# Patient Record
Sex: FEMALE | Race: BLACK OR AFRICAN AMERICAN | Employment: UNEMPLOYED | URBAN - METROPOLITAN AREA
[De-identification: names, ages, dates, MRNs, and addresses within clinical notes are randomized per-mention and may not be internally consistent; named-entity substitution may affect disease eponyms.]

---

## 2017-01-01 ENCOUNTER — TELEPHONE (OUTPATIENT)
Dept: OTHER | Age: 82
End: 2017-01-01

## 2017-01-01 ENCOUNTER — HOSPITAL ENCOUNTER (INPATIENT)
Age: 82
LOS: 13 days | Discharge: HOSPICE | DRG: 388 | End: 2017-02-07
Attending: EMERGENCY MEDICINE | Admitting: INTERNAL MEDICINE
Payer: MEDICARE

## 2017-01-01 ENCOUNTER — APPOINTMENT (OUTPATIENT)
Dept: GENERAL RADIOLOGY | Age: 82
DRG: 388 | End: 2017-01-01
Payer: MEDICARE

## 2017-01-01 ENCOUNTER — DOCUMENTATION ONLY (OUTPATIENT)
Dept: OTHER | Age: 82
End: 2017-01-01

## 2017-01-01 ENCOUNTER — HOME CARE VISIT (OUTPATIENT)
Dept: HOSPICE | Facility: HOSPICE | Age: 82
End: 2017-01-01
Payer: MEDICARE

## 2017-01-01 ENCOUNTER — HOME CARE VISIT (OUTPATIENT)
Dept: SCHEDULING | Facility: HOME HEALTH | Age: 82
End: 2017-01-01
Payer: MEDICARE

## 2017-01-01 ENCOUNTER — HOSPITAL ENCOUNTER (INPATIENT)
Age: 82
LOS: 1 days | End: 2017-11-07
Attending: INTERNAL MEDICINE | Admitting: INTERNAL MEDICINE

## 2017-01-01 ENCOUNTER — APPOINTMENT (OUTPATIENT)
Dept: GENERAL RADIOLOGY | Age: 82
DRG: 388 | End: 2017-01-01
Attending: PHYSICIAN ASSISTANT
Payer: MEDICARE

## 2017-01-01 ENCOUNTER — APPOINTMENT (OUTPATIENT)
Dept: GENERAL RADIOLOGY | Age: 82
DRG: 389 | End: 2017-01-01
Attending: SURGERY
Payer: MEDICARE

## 2017-01-01 ENCOUNTER — APPOINTMENT (OUTPATIENT)
Dept: GENERAL RADIOLOGY | Age: 82
DRG: 388 | End: 2017-01-01
Attending: NURSE PRACTITIONER
Payer: MEDICARE

## 2017-01-01 ENCOUNTER — HOSPITAL ENCOUNTER (INPATIENT)
Age: 82
LOS: 3 days | Discharge: HOSPICE | DRG: 389 | End: 2017-06-17
Attending: STUDENT IN AN ORGANIZED HEALTH CARE EDUCATION/TRAINING PROGRAM | Admitting: FAMILY MEDICINE
Payer: MEDICARE

## 2017-01-01 ENCOUNTER — APPOINTMENT (OUTPATIENT)
Dept: CT IMAGING | Age: 82
DRG: 388 | End: 2017-01-01
Attending: EMERGENCY MEDICINE
Payer: MEDICARE

## 2017-01-01 ENCOUNTER — HOSPICE ADMISSION (OUTPATIENT)
Dept: HOSPICE | Facility: HOSPICE | Age: 82
End: 2017-01-01
Payer: MEDICARE

## 2017-01-01 ENCOUNTER — HOSPITAL ENCOUNTER (INPATIENT)
Age: 82
LOS: 16 days | Discharge: HOME | End: 2017-07-03
Attending: INTERNAL MEDICINE | Admitting: INTERNAL MEDICINE

## 2017-01-01 ENCOUNTER — APPOINTMENT (OUTPATIENT)
Dept: CT IMAGING | Age: 82
DRG: 388 | End: 2017-01-01
Attending: NURSE PRACTITIONER
Payer: MEDICARE

## 2017-01-01 ENCOUNTER — APPOINTMENT (OUTPATIENT)
Dept: CT IMAGING | Age: 82
DRG: 389 | End: 2017-01-01
Attending: STUDENT IN AN ORGANIZED HEALTH CARE EDUCATION/TRAINING PROGRAM
Payer: MEDICARE

## 2017-01-01 ENCOUNTER — APPOINTMENT (OUTPATIENT)
Dept: GENERAL RADIOLOGY | Age: 82
DRG: 388 | End: 2017-01-01
Attending: INTERNAL MEDICINE
Payer: MEDICARE

## 2017-01-01 VITALS — HEART RATE: 55 BPM | RESPIRATION RATE: 18 BRPM | DIASTOLIC BLOOD PRESSURE: 70 MMHG | SYSTOLIC BLOOD PRESSURE: 154 MMHG

## 2017-01-01 VITALS — HEART RATE: 92 BPM | TEMPERATURE: 97.5 F | RESPIRATION RATE: 20 BRPM

## 2017-01-01 VITALS
TEMPERATURE: 97 F | HEART RATE: 70 BPM | RESPIRATION RATE: 16 BRPM | DIASTOLIC BLOOD PRESSURE: 73 MMHG | SYSTOLIC BLOOD PRESSURE: 150 MMHG

## 2017-01-01 VITALS
SYSTOLIC BLOOD PRESSURE: 184 MMHG | TEMPERATURE: 97.7 F | HEART RATE: 78 BPM | HEIGHT: 64 IN | WEIGHT: 100 LBS | BODY MASS INDEX: 17.07 KG/M2 | RESPIRATION RATE: 16 BRPM | OXYGEN SATURATION: 95 % | DIASTOLIC BLOOD PRESSURE: 78 MMHG

## 2017-01-01 VITALS
DIASTOLIC BLOOD PRESSURE: 64 MMHG | TEMPERATURE: 96.5 F | RESPIRATION RATE: 20 BRPM | HEART RATE: 102 BPM | SYSTOLIC BLOOD PRESSURE: 142 MMHG

## 2017-01-01 VITALS
RESPIRATION RATE: 20 BRPM | DIASTOLIC BLOOD PRESSURE: 80 MMHG | HEART RATE: 78 BPM | SYSTOLIC BLOOD PRESSURE: 110 MMHG | TEMPERATURE: 97.5 F

## 2017-01-01 VITALS — HEART RATE: 62 BPM | SYSTOLIC BLOOD PRESSURE: 115 MMHG | RESPIRATION RATE: 18 BRPM | DIASTOLIC BLOOD PRESSURE: 53 MMHG

## 2017-01-01 VITALS — DIASTOLIC BLOOD PRESSURE: 63 MMHG | RESPIRATION RATE: 18 BRPM | HEART RATE: 66 BPM | SYSTOLIC BLOOD PRESSURE: 110 MMHG

## 2017-01-01 VITALS
WEIGHT: 90 LBS | RESPIRATION RATE: 18 BRPM | DIASTOLIC BLOOD PRESSURE: 80 MMHG | SYSTOLIC BLOOD PRESSURE: 143 MMHG | BODY MASS INDEX: 16.46 KG/M2 | HEART RATE: 61 BPM

## 2017-01-01 VITALS
HEART RATE: 72 BPM | SYSTOLIC BLOOD PRESSURE: 171 MMHG | WEIGHT: 100 LBS | HEIGHT: 62 IN | RESPIRATION RATE: 16 BRPM | OXYGEN SATURATION: 99 % | DIASTOLIC BLOOD PRESSURE: 78 MMHG | BODY MASS INDEX: 18.4 KG/M2 | TEMPERATURE: 97 F

## 2017-01-01 VITALS — DIASTOLIC BLOOD PRESSURE: 65 MMHG | HEART RATE: 60 BPM | SYSTOLIC BLOOD PRESSURE: 118 MMHG | RESPIRATION RATE: 18 BRPM

## 2017-01-01 VITALS — SYSTOLIC BLOOD PRESSURE: 132 MMHG | RESPIRATION RATE: 20 BRPM | DIASTOLIC BLOOD PRESSURE: 67 MMHG | HEART RATE: 68 BPM

## 2017-01-01 VITALS — RESPIRATION RATE: 18 BRPM | DIASTOLIC BLOOD PRESSURE: 69 MMHG | HEART RATE: 72 BPM | SYSTOLIC BLOOD PRESSURE: 111 MMHG

## 2017-01-01 VITALS — SYSTOLIC BLOOD PRESSURE: 122 MMHG | DIASTOLIC BLOOD PRESSURE: 67 MMHG | HEART RATE: 58 BPM | RESPIRATION RATE: 18 BRPM

## 2017-01-01 VITALS — SYSTOLIC BLOOD PRESSURE: 145 MMHG | DIASTOLIC BLOOD PRESSURE: 75 MMHG | RESPIRATION RATE: 18 BRPM | HEART RATE: 57 BPM

## 2017-01-01 VITALS
WEIGHT: 92 LBS | RESPIRATION RATE: 18 BRPM | SYSTOLIC BLOOD PRESSURE: 108 MMHG | BODY MASS INDEX: 16.83 KG/M2 | DIASTOLIC BLOOD PRESSURE: 65 MMHG | HEART RATE: 65 BPM

## 2017-01-01 VITALS
DIASTOLIC BLOOD PRESSURE: 88 MMHG | TEMPERATURE: 97.9 F | HEART RATE: 88 BPM | SYSTOLIC BLOOD PRESSURE: 128 MMHG | RESPIRATION RATE: 18 BRPM

## 2017-01-01 VITALS — RESPIRATION RATE: 24 BRPM | SYSTOLIC BLOOD PRESSURE: 80 MMHG | HEART RATE: 126 BPM

## 2017-01-01 VITALS — DIASTOLIC BLOOD PRESSURE: 58 MMHG | SYSTOLIC BLOOD PRESSURE: 128 MMHG | RESPIRATION RATE: 18 BRPM | HEART RATE: 92 BPM

## 2017-01-01 VITALS
BODY MASS INDEX: 15.91 KG/M2 | HEART RATE: 81 BPM | RESPIRATION RATE: 18 BRPM | SYSTOLIC BLOOD PRESSURE: 135 MMHG | WEIGHT: 87 LBS | DIASTOLIC BLOOD PRESSURE: 65 MMHG

## 2017-01-01 VITALS — SYSTOLIC BLOOD PRESSURE: 152 MMHG | RESPIRATION RATE: 18 BRPM | HEART RATE: 74 BPM | DIASTOLIC BLOOD PRESSURE: 83 MMHG

## 2017-01-01 VITALS — SYSTOLIC BLOOD PRESSURE: 152 MMHG | DIASTOLIC BLOOD PRESSURE: 73 MMHG | RESPIRATION RATE: 18 BRPM | HEART RATE: 54 BPM

## 2017-01-01 VITALS — SYSTOLIC BLOOD PRESSURE: 104 MMHG | HEART RATE: 59 BPM | DIASTOLIC BLOOD PRESSURE: 49 MMHG | RESPIRATION RATE: 18 BRPM

## 2017-01-01 VITALS — DIASTOLIC BLOOD PRESSURE: 82 MMHG | RESPIRATION RATE: 18 BRPM | SYSTOLIC BLOOD PRESSURE: 164 MMHG | HEART RATE: 80 BPM

## 2017-01-01 VITALS — DIASTOLIC BLOOD PRESSURE: 84 MMHG | RESPIRATION RATE: 18 BRPM | HEART RATE: 60 BPM | SYSTOLIC BLOOD PRESSURE: 154 MMHG

## 2017-01-01 VITALS — HEART RATE: 72 BPM | SYSTOLIC BLOOD PRESSURE: 137 MMHG | DIASTOLIC BLOOD PRESSURE: 73 MMHG | RESPIRATION RATE: 18 BRPM

## 2017-01-01 VITALS — DIASTOLIC BLOOD PRESSURE: 80 MMHG | RESPIRATION RATE: 18 BRPM | HEART RATE: 68 BPM | SYSTOLIC BLOOD PRESSURE: 118 MMHG

## 2017-01-01 DIAGNOSIS — N17.9 ACUTE KIDNEY INJURY (HCC): Primary | ICD-10-CM

## 2017-01-01 DIAGNOSIS — K56.609 SMALL BOWEL OBSTRUCTION (HCC): Primary | ICD-10-CM

## 2017-01-01 DIAGNOSIS — E86.0 DEHYDRATION: ICD-10-CM

## 2017-01-01 DIAGNOSIS — K56.50 SMALL BOWEL OBSTRUCTION DUE TO ADHESIONS (HCC): ICD-10-CM

## 2017-01-01 DIAGNOSIS — N39.0 LOWER URINARY TRACT INFECTIOUS DISEASE: ICD-10-CM

## 2017-01-01 DIAGNOSIS — N17.9 ACUTE KIDNEY INJURY (HCC): ICD-10-CM

## 2017-01-01 DIAGNOSIS — N18.30 CHRONIC KIDNEY DISEASE, STAGE III (MODERATE) (HCC): Chronic | ICD-10-CM

## 2017-01-01 LAB
ABO + RH BLD: NORMAL
ALBUMIN SERPL BCP-MCNC: 2.2 G/DL (ref 3.2–4.6)
ALBUMIN SERPL BCP-MCNC: 2.4 G/DL (ref 3.2–4.6)
ALBUMIN SERPL BCP-MCNC: 2.5 G/DL (ref 3.2–4.6)
ALBUMIN SERPL BCP-MCNC: 2.5 G/DL (ref 3.2–4.6)
ALBUMIN SERPL BCP-MCNC: 2.6 G/DL (ref 3.2–4.6)
ALBUMIN SERPL BCP-MCNC: 2.7 G/DL (ref 3.2–4.6)
ALBUMIN SERPL BCP-MCNC: 2.8 G/DL (ref 3.2–4.6)
ALBUMIN SERPL BCP-MCNC: 3.4 G/DL (ref 3.2–4.6)
ALBUMIN SERPL BCP-MCNC: 4.1 G/DL (ref 3.2–4.6)
ALBUMIN SERPL BCP-MCNC: 4.4 G/DL (ref 3.2–4.6)
ALBUMIN/GLOB SERPL: 0.5 {RATIO} (ref 1.2–3.5)
ALBUMIN/GLOB SERPL: 0.5 {RATIO} (ref 1.2–3.5)
ALBUMIN/GLOB SERPL: 0.6 {RATIO} (ref 1.2–3.5)
ALBUMIN/GLOB SERPL: 0.7 {RATIO} (ref 1.2–3.5)
ALBUMIN/GLOB SERPL: 0.7 {RATIO} (ref 1.2–3.5)
ALBUMIN/GLOB SERPL: 0.8 {RATIO} (ref 1.2–3.5)
ALBUMIN/GLOB SERPL: 0.9 {RATIO} (ref 1.2–3.5)
ALBUMIN/GLOB SERPL: 0.9 {RATIO} (ref 1.2–3.5)
ALP SERPL-CCNC: 29 U/L (ref 50–136)
ALP SERPL-CCNC: 37 U/L (ref 50–136)
ALP SERPL-CCNC: 40 U/L (ref 50–136)
ALP SERPL-CCNC: 44 U/L (ref 50–136)
ALP SERPL-CCNC: 45 U/L (ref 50–136)
ALP SERPL-CCNC: 47 U/L (ref 50–136)
ALP SERPL-CCNC: 48 U/L (ref 50–136)
ALP SERPL-CCNC: 54 U/L (ref 50–136)
ALP SERPL-CCNC: 58 U/L (ref 50–136)
ALP SERPL-CCNC: 77 U/L (ref 50–136)
ALT SERPL-CCNC: 109 U/L (ref 12–65)
ALT SERPL-CCNC: 14 U/L (ref 12–65)
ALT SERPL-CCNC: 16 U/L (ref 12–65)
ALT SERPL-CCNC: 21 U/L (ref 12–65)
ALT SERPL-CCNC: 23 U/L (ref 12–65)
ALT SERPL-CCNC: 26 U/L (ref 12–65)
ALT SERPL-CCNC: 31 U/L (ref 12–65)
ALT SERPL-CCNC: 33 U/L (ref 12–65)
ALT SERPL-CCNC: 34 U/L (ref 12–65)
ALT SERPL-CCNC: 42 U/L (ref 12–65)
ANION GAP BLD CALC-SCNC: 10 MMOL/L (ref 7–16)
ANION GAP BLD CALC-SCNC: 11 MMOL/L (ref 7–16)
ANION GAP BLD CALC-SCNC: 11 MMOL/L (ref 7–16)
ANION GAP BLD CALC-SCNC: 12 MMOL/L (ref 7–16)
ANION GAP BLD CALC-SCNC: 12 MMOL/L (ref 7–16)
ANION GAP BLD CALC-SCNC: 13 MMOL/L (ref 7–16)
ANION GAP BLD CALC-SCNC: 13 MMOL/L (ref 7–16)
ANION GAP BLD CALC-SCNC: 15 MMOL/L (ref 7–16)
ANION GAP BLD CALC-SCNC: 6 MMOL/L (ref 7–16)
ANION GAP BLD CALC-SCNC: 7 MMOL/L (ref 7–16)
ANION GAP BLD CALC-SCNC: 7 MMOL/L (ref 7–16)
ANION GAP BLD CALC-SCNC: 8 MMOL/L (ref 7–16)
ANION GAP BLD CALC-SCNC: 9 MMOL/L (ref 7–16)
APPEARANCE UR: CLEAR
AST SERPL W P-5'-P-CCNC: 103 U/L (ref 15–37)
AST SERPL W P-5'-P-CCNC: 22 U/L (ref 15–37)
AST SERPL W P-5'-P-CCNC: 22 U/L (ref 15–37)
AST SERPL W P-5'-P-CCNC: 25 U/L (ref 15–37)
AST SERPL W P-5'-P-CCNC: 26 U/L (ref 15–37)
AST SERPL W P-5'-P-CCNC: 26 U/L (ref 15–37)
AST SERPL W P-5'-P-CCNC: 32 U/L (ref 15–37)
AST SERPL W P-5'-P-CCNC: 37 U/L (ref 15–37)
AST SERPL W P-5'-P-CCNC: 39 U/L (ref 15–37)
AST SERPL W P-5'-P-CCNC: 41 U/L (ref 15–37)
BACTERIA SPEC CULT: NEGATIVE
BACTERIA SPEC CULT: NORMAL
BACTERIA URNS QL MICRO: 0 /HPF
BASOPHILS # BLD AUTO: 0 K/UL (ref 0–0.2)
BASOPHILS # BLD: 0 % (ref 0–2)
BILIRUB SERPL-MCNC: 0.4 MG/DL (ref 0.2–1.1)
BILIRUB SERPL-MCNC: 0.5 MG/DL (ref 0.2–1.1)
BILIRUB SERPL-MCNC: 0.5 MG/DL (ref 0.2–1.1)
BILIRUB SERPL-MCNC: 0.6 MG/DL (ref 0.2–1.1)
BILIRUB SERPL-MCNC: 0.7 MG/DL (ref 0.2–1.1)
BILIRUB SERPL-MCNC: 0.7 MG/DL (ref 0.2–1.1)
BILIRUB SERPL-MCNC: 0.9 MG/DL (ref 0.2–1.1)
BILIRUB UR QL: NEGATIVE
BLD PROD TYP BPU: NORMAL
BLOOD GROUP ANTIBODIES SERPL: NORMAL
BPU ID: NORMAL
BUN SERPL-MCNC: 13 MG/DL (ref 8–23)
BUN SERPL-MCNC: 15 MG/DL (ref 8–23)
BUN SERPL-MCNC: 19 MG/DL (ref 8–23)
BUN SERPL-MCNC: 22 MG/DL (ref 8–23)
BUN SERPL-MCNC: 24 MG/DL (ref 8–23)
BUN SERPL-MCNC: 25 MG/DL (ref 8–23)
BUN SERPL-MCNC: 28 MG/DL (ref 8–23)
BUN SERPL-MCNC: 29 MG/DL (ref 8–23)
BUN SERPL-MCNC: 30 MG/DL (ref 8–23)
BUN SERPL-MCNC: 32 MG/DL (ref 8–23)
BUN SERPL-MCNC: 34 MG/DL (ref 8–23)
BUN SERPL-MCNC: 37 MG/DL (ref 8–23)
BUN SERPL-MCNC: 39 MG/DL (ref 8–23)
BUN SERPL-MCNC: 44 MG/DL (ref 8–23)
BUN SERPL-MCNC: 48 MG/DL (ref 8–23)
BUN SERPL-MCNC: 59 MG/DL (ref 8–23)
BUN SERPL-MCNC: 66 MG/DL (ref 8–23)
BUN SERPL-MCNC: 73 MG/DL (ref 8–23)
CALCIUM SERPL-MCNC: 10 MG/DL (ref 8.3–10.4)
CALCIUM SERPL-MCNC: 7.3 MG/DL (ref 8.3–10.4)
CALCIUM SERPL-MCNC: 7.6 MG/DL (ref 8.3–10.4)
CALCIUM SERPL-MCNC: 8 MG/DL (ref 8.3–10.4)
CALCIUM SERPL-MCNC: 8.3 MG/DL (ref 8.3–10.4)
CALCIUM SERPL-MCNC: 8.4 MG/DL (ref 8.3–10.4)
CALCIUM SERPL-MCNC: 8.5 MG/DL (ref 8.3–10.4)
CALCIUM SERPL-MCNC: 8.6 MG/DL (ref 8.3–10.4)
CALCIUM SERPL-MCNC: 8.7 MG/DL (ref 8.3–10.4)
CALCIUM SERPL-MCNC: 8.8 MG/DL (ref 8.3–10.4)
CALCIUM SERPL-MCNC: 8.9 MG/DL (ref 8.3–10.4)
CALCIUM SERPL-MCNC: 8.9 MG/DL (ref 8.3–10.4)
CALCIUM SERPL-MCNC: 9.2 MG/DL (ref 8.3–10.4)
CALCIUM SERPL-MCNC: 9.2 MG/DL (ref 8.3–10.4)
CASTS URNS QL MICRO: 0 /LPF
CHLORIDE SERPL-SCNC: 103 MMOL/L (ref 98–107)
CHLORIDE SERPL-SCNC: 104 MMOL/L (ref 98–107)
CHLORIDE SERPL-SCNC: 105 MMOL/L (ref 98–107)
CHLORIDE SERPL-SCNC: 107 MMOL/L (ref 98–107)
CHLORIDE SERPL-SCNC: 107 MMOL/L (ref 98–107)
CHLORIDE SERPL-SCNC: 108 MMOL/L (ref 98–107)
CHLORIDE SERPL-SCNC: 109 MMOL/L (ref 98–107)
CHLORIDE SERPL-SCNC: 109 MMOL/L (ref 98–107)
CHLORIDE SERPL-SCNC: 110 MMOL/L (ref 98–107)
CHLORIDE SERPL-SCNC: 111 MMOL/L (ref 98–107)
CHLORIDE SERPL-SCNC: 111 MMOL/L (ref 98–107)
CHLORIDE SERPL-SCNC: 112 MMOL/L (ref 98–107)
CHLORIDE SERPL-SCNC: 114 MMOL/L (ref 98–107)
CHLORIDE SERPL-SCNC: 117 MMOL/L (ref 98–107)
CHLORIDE SERPL-SCNC: 118 MMOL/L (ref 98–107)
CO2 SERPL-SCNC: 19 MMOL/L (ref 21–32)
CO2 SERPL-SCNC: 20 MMOL/L (ref 21–32)
CO2 SERPL-SCNC: 20 MMOL/L (ref 21–32)
CO2 SERPL-SCNC: 21 MMOL/L (ref 21–32)
CO2 SERPL-SCNC: 22 MMOL/L (ref 21–32)
CO2 SERPL-SCNC: 22 MMOL/L (ref 21–32)
CO2 SERPL-SCNC: 24 MMOL/L (ref 21–32)
CO2 SERPL-SCNC: 25 MMOL/L (ref 21–32)
CO2 SERPL-SCNC: 26 MMOL/L (ref 21–32)
CO2 SERPL-SCNC: 27 MMOL/L (ref 21–32)
COLOR UR: YELLOW
CREAT SERPL-MCNC: 1.3 MG/DL (ref 0.6–1)
CREAT SERPL-MCNC: 1.35 MG/DL (ref 0.6–1)
CREAT SERPL-MCNC: 1.37 MG/DL (ref 0.6–1)
CREAT SERPL-MCNC: 1.5 MG/DL (ref 0.6–1)
CREAT SERPL-MCNC: 1.55 MG/DL (ref 0.6–1)
CREAT SERPL-MCNC: 1.6 MG/DL (ref 0.6–1)
CREAT SERPL-MCNC: 1.68 MG/DL (ref 0.6–1)
CREAT SERPL-MCNC: 1.69 MG/DL (ref 0.6–1)
CREAT SERPL-MCNC: 1.73 MG/DL (ref 0.6–1)
CREAT SERPL-MCNC: 1.75 MG/DL (ref 0.6–1)
CREAT SERPL-MCNC: 1.78 MG/DL (ref 0.6–1)
CREAT SERPL-MCNC: 1.81 MG/DL (ref 0.6–1)
CREAT SERPL-MCNC: 1.93 MG/DL (ref 0.6–1)
CREAT SERPL-MCNC: 1.98 MG/DL (ref 0.6–1)
CREAT SERPL-MCNC: 2.28 MG/DL (ref 0.6–1)
CREAT SERPL-MCNC: 2.62 MG/DL (ref 0.6–1)
CREAT SERPL-MCNC: 2.66 MG/DL (ref 0.6–1)
CREAT SERPL-MCNC: 2.97 MG/DL (ref 0.6–1)
CROSSMATCH RESULT,%XM: NORMAL
DIFFERENTIAL METHOD BLD: ABNORMAL
EOSINOPHIL # BLD: 0 K/UL (ref 0–0.8)
EOSINOPHIL # BLD: 0.1 K/UL (ref 0–0.8)
EOSINOPHIL # BLD: 0.2 K/UL (ref 0–0.8)
EOSINOPHIL # BLD: 0.4 K/UL (ref 0–0.8)
EOSINOPHIL NFR BLD MANUAL: 4 % (ref 1–8)
EOSINOPHIL NFR BLD: 0 % (ref 0.5–7.8)
EOSINOPHIL NFR BLD: 1 % (ref 0.5–7.8)
EOSINOPHIL NFR BLD: 2 % (ref 0.5–7.8)
EOSINOPHIL NFR BLD: 2 % (ref 0.5–7.8)
EOSINOPHIL NFR BLD: 3 % (ref 0.5–7.8)
EPI CELLS #/AREA URNS HPF: 0 /HPF
ERYTHROCYTE [DISTWIDTH] IN BLOOD BY AUTOMATED COUNT: 13 % (ref 11.9–14.6)
ERYTHROCYTE [DISTWIDTH] IN BLOOD BY AUTOMATED COUNT: 13.1 % (ref 11.9–14.6)
ERYTHROCYTE [DISTWIDTH] IN BLOOD BY AUTOMATED COUNT: 13.1 % (ref 11.9–14.6)
ERYTHROCYTE [DISTWIDTH] IN BLOOD BY AUTOMATED COUNT: 13.3 % (ref 11.9–14.6)
ERYTHROCYTE [DISTWIDTH] IN BLOOD BY AUTOMATED COUNT: 13.3 % (ref 11.9–14.6)
ERYTHROCYTE [DISTWIDTH] IN BLOOD BY AUTOMATED COUNT: 13.4 % (ref 11.9–14.6)
ERYTHROCYTE [DISTWIDTH] IN BLOOD BY AUTOMATED COUNT: 13.7 % (ref 11.9–14.6)
ERYTHROCYTE [DISTWIDTH] IN BLOOD BY AUTOMATED COUNT: 13.8 % (ref 11.9–14.6)
ERYTHROCYTE [DISTWIDTH] IN BLOOD BY AUTOMATED COUNT: 13.8 % (ref 11.9–14.6)
ERYTHROCYTE [DISTWIDTH] IN BLOOD BY AUTOMATED COUNT: 14.7 % (ref 11.9–14.6)
ERYTHROCYTE [DISTWIDTH] IN BLOOD BY AUTOMATED COUNT: 14.8 % (ref 11.9–14.6)
GLOBULIN SER CALC-MCNC: 3.7 G/DL (ref 2.3–3.5)
GLOBULIN SER CALC-MCNC: 3.9 G/DL (ref 2.3–3.5)
GLOBULIN SER CALC-MCNC: 4.1 G/DL (ref 2.3–3.5)
GLOBULIN SER CALC-MCNC: 4.3 G/DL (ref 2.3–3.5)
GLOBULIN SER CALC-MCNC: 4.3 G/DL (ref 2.3–3.5)
GLOBULIN SER CALC-MCNC: 4.4 G/DL (ref 2.3–3.5)
GLOBULIN SER CALC-MCNC: 4.5 G/DL (ref 2.3–3.5)
GLOBULIN SER CALC-MCNC: 5 G/DL (ref 2.3–3.5)
GLUCOSE SERPL-MCNC: 101 MG/DL (ref 65–100)
GLUCOSE SERPL-MCNC: 101 MG/DL (ref 65–100)
GLUCOSE SERPL-MCNC: 109 MG/DL (ref 65–100)
GLUCOSE SERPL-MCNC: 111 MG/DL (ref 65–100)
GLUCOSE SERPL-MCNC: 156 MG/DL (ref 65–100)
GLUCOSE SERPL-MCNC: 69 MG/DL (ref 65–100)
GLUCOSE SERPL-MCNC: 78 MG/DL (ref 65–100)
GLUCOSE SERPL-MCNC: 79 MG/DL (ref 65–100)
GLUCOSE SERPL-MCNC: 81 MG/DL (ref 65–100)
GLUCOSE SERPL-MCNC: 93 MG/DL (ref 65–100)
GLUCOSE SERPL-MCNC: 94 MG/DL (ref 65–100)
GLUCOSE SERPL-MCNC: 95 MG/DL (ref 65–100)
GLUCOSE SERPL-MCNC: 96 MG/DL (ref 65–100)
GLUCOSE SERPL-MCNC: 97 MG/DL (ref 65–100)
GLUCOSE SERPL-MCNC: 98 MG/DL (ref 65–100)
GLUCOSE SERPL-MCNC: 99 MG/DL (ref 65–100)
GLUCOSE UR STRIP.AUTO-MCNC: NEGATIVE MG/DL
HCT VFR BLD AUTO: 23.2 % (ref 35.8–46.3)
HCT VFR BLD AUTO: 24.5 % (ref 35.8–46.3)
HCT VFR BLD AUTO: 25.6 % (ref 35.8–46.3)
HCT VFR BLD AUTO: 25.9 % (ref 35.8–46.3)
HCT VFR BLD AUTO: 26.9 % (ref 35.8–46.3)
HCT VFR BLD AUTO: 27.6 % (ref 35.8–46.3)
HCT VFR BLD AUTO: 29 % (ref 35.8–46.3)
HCT VFR BLD AUTO: 29.3 % (ref 35.8–46.3)
HCT VFR BLD AUTO: 30 % (ref 35.8–46.3)
HCT VFR BLD AUTO: 30.8 % (ref 35.8–46.3)
HCT VFR BLD AUTO: 30.8 % (ref 35.8–46.3)
HCT VFR BLD AUTO: 31 % (ref 35.8–46.3)
HCT VFR BLD AUTO: 32.6 % (ref 35.8–46.3)
HCT VFR BLD AUTO: 34 % (ref 35.8–46.3)
HCT VFR BLD AUTO: 34.7 % (ref 35.8–46.3)
HEMOCCULT STL QL: POSITIVE
HGB BLD-MCNC: 10.2 G/DL (ref 11.7–15.4)
HGB BLD-MCNC: 10.8 G/DL (ref 11.7–15.4)
HGB BLD-MCNC: 11.1 G/DL (ref 11.7–15.4)
HGB BLD-MCNC: 11.2 G/DL (ref 11.7–15.4)
HGB BLD-MCNC: 7.5 G/DL (ref 11.7–15.4)
HGB BLD-MCNC: 7.9 G/DL (ref 11.7–15.4)
HGB BLD-MCNC: 7.9 G/DL (ref 11.7–15.4)
HGB BLD-MCNC: 8.3 G/DL (ref 11.7–15.4)
HGB BLD-MCNC: 8.7 G/DL (ref 11.7–15.4)
HGB BLD-MCNC: 8.7 G/DL (ref 11.7–15.4)
HGB BLD-MCNC: 9.6 G/DL (ref 11.7–15.4)
HGB BLD-MCNC: 9.7 G/DL (ref 11.7–15.4)
HGB BLD-MCNC: 9.8 G/DL (ref 11.7–15.4)
HGB BLD-MCNC: 9.8 G/DL (ref 11.7–15.4)
HGB BLD-MCNC: 9.9 G/DL (ref 11.7–15.4)
HGB UR QL STRIP: NEGATIVE
IMM GRANULOCYTES # BLD: 0 K/UL (ref 0–0.5)
IMM GRANULOCYTES # BLD: 0.1 K/UL (ref 0–0.5)
IMM GRANULOCYTES # BLD: 0.1 K/UL (ref 0–0.5)
IMM GRANULOCYTES NFR BLD AUTO: 0 % (ref 0–5)
IMM GRANULOCYTES NFR BLD AUTO: 0.1 % (ref 0–5)
IMM GRANULOCYTES NFR BLD AUTO: 0.2 % (ref 0–5)
IMM GRANULOCYTES NFR BLD AUTO: 0.3 % (ref 0–5)
IMM GRANULOCYTES NFR BLD AUTO: 0.3 % (ref 0–5)
IMM GRANULOCYTES NFR BLD AUTO: 0.5 % (ref 0–5)
IMM GRANULOCYTES NFR BLD AUTO: 0.5 % (ref 0–5)
IMM GRANULOCYTES NFR BLD AUTO: 0.6 % (ref 0–5)
KETONES UR QL STRIP.AUTO: ABNORMAL MG/DL
LACTATE BLD-SCNC: 1.4 MMOL/L (ref 0.5–1.9)
LACTATE BLD-SCNC: 2.4 MMOL/L (ref 0.5–1.9)
LACTATE SERPL-SCNC: 0.8 MMOL/L (ref 0.4–2)
LEUKOCYTE ESTERASE UR QL STRIP.AUTO: NEGATIVE
LIPASE SERPL-CCNC: 384 U/L (ref 73–393)
LIPASE SERPL-CCNC: 432 U/L (ref 73–393)
LYMPHOCYTES # BLD AUTO: 13 % (ref 13–44)
LYMPHOCYTES # BLD AUTO: 15 % (ref 13–44)
LYMPHOCYTES # BLD AUTO: 16 % (ref 13–44)
LYMPHOCYTES # BLD AUTO: 17 % (ref 13–44)
LYMPHOCYTES # BLD AUTO: 17 % (ref 13–44)
LYMPHOCYTES # BLD AUTO: 19 % (ref 13–44)
LYMPHOCYTES # BLD AUTO: 21 % (ref 13–44)
LYMPHOCYTES # BLD AUTO: 24 % (ref 13–44)
LYMPHOCYTES # BLD AUTO: 33 % (ref 13–44)
LYMPHOCYTES # BLD AUTO: 7 % (ref 13–44)
LYMPHOCYTES # BLD: 0.8 K/UL (ref 0.5–4.6)
LYMPHOCYTES # BLD: 1 K/UL (ref 0.5–4.6)
LYMPHOCYTES # BLD: 1.1 K/UL (ref 0.5–4.6)
LYMPHOCYTES # BLD: 1.3 K/UL (ref 0.5–4.6)
LYMPHOCYTES # BLD: 1.3 K/UL (ref 0.5–4.6)
LYMPHOCYTES # BLD: 1.4 K/UL (ref 0.5–4.6)
LYMPHOCYTES # BLD: 1.5 K/UL (ref 0.5–4.6)
LYMPHOCYTES # BLD: 1.8 K/UL (ref 0.5–4.6)
LYMPHOCYTES # BLD: 2.3 K/UL (ref 0.5–4.6)
LYMPHOCYTES # BLD: 2.3 K/UL (ref 0.5–4.6)
LYMPHOCYTES NFR BLD MANUAL: 11 % (ref 16–44)
LYMPHOCYTES NFR BLD MANUAL: 16 % (ref 16–44)
MAGNESIUM SERPL-MCNC: 2.1 MG/DL (ref 1.8–2.4)
MAGNESIUM SERPL-MCNC: 2.6 MG/DL (ref 1.8–2.4)
MCH RBC QN AUTO: 28.5 PG (ref 26.1–32.9)
MCH RBC QN AUTO: 29 PG (ref 26.1–32.9)
MCH RBC QN AUTO: 29.1 PG (ref 26.1–32.9)
MCH RBC QN AUTO: 29.3 PG (ref 26.1–32.9)
MCH RBC QN AUTO: 29.5 PG (ref 26.1–32.9)
MCH RBC QN AUTO: 29.6 PG (ref 26.1–32.9)
MCH RBC QN AUTO: 29.7 PG (ref 26.1–32.9)
MCH RBC QN AUTO: 29.9 PG (ref 26.1–32.9)
MCHC RBC AUTO-ENTMCNC: 30.9 G/DL (ref 31.4–35)
MCHC RBC AUTO-ENTMCNC: 31.5 G/DL (ref 31.4–35)
MCHC RBC AUTO-ENTMCNC: 32 G/DL (ref 31.4–35)
MCHC RBC AUTO-ENTMCNC: 32 G/DL (ref 31.4–35)
MCHC RBC AUTO-ENTMCNC: 32.1 G/DL (ref 31.4–35)
MCHC RBC AUTO-ENTMCNC: 32.2 G/DL (ref 31.4–35)
MCHC RBC AUTO-ENTMCNC: 32.3 G/DL (ref 31.4–35)
MCHC RBC AUTO-ENTMCNC: 32.3 G/DL (ref 31.4–35)
MCHC RBC AUTO-ENTMCNC: 32.7 G/DL (ref 31.4–35)
MCHC RBC AUTO-ENTMCNC: 32.9 G/DL (ref 31.4–35)
MCHC RBC AUTO-ENTMCNC: 32.9 G/DL (ref 31.4–35)
MCHC RBC AUTO-ENTMCNC: 33.1 G/DL (ref 31.4–35)
MCV RBC AUTO: 89.2 FL (ref 79.6–97.8)
MCV RBC AUTO: 89.6 FL (ref 79.6–97.8)
MCV RBC AUTO: 89.6 FL (ref 79.6–97.8)
MCV RBC AUTO: 90.4 FL (ref 79.6–97.8)
MCV RBC AUTO: 90.4 FL (ref 79.6–97.8)
MCV RBC AUTO: 90.6 FL (ref 79.6–97.8)
MCV RBC AUTO: 90.7 FL (ref 79.6–97.8)
MCV RBC AUTO: 90.7 FL (ref 79.6–97.8)
MCV RBC AUTO: 90.8 FL (ref 79.6–97.8)
MCV RBC AUTO: 91.3 FL (ref 79.6–97.8)
MCV RBC AUTO: 91.5 FL (ref 79.6–97.8)
MCV RBC AUTO: 91.9 FL (ref 79.6–97.8)
MCV RBC AUTO: 92 FL (ref 79.6–97.8)
MCV RBC AUTO: 92.4 FL (ref 79.6–97.8)
MONOCYTES # BLD: 0.6 K/UL (ref 0.1–1.3)
MONOCYTES # BLD: 0.7 K/UL (ref 0.1–1.3)
MONOCYTES # BLD: 0.8 K/UL (ref 0.1–1.3)
MONOCYTES # BLD: 0.9 K/UL (ref 0.1–1.3)
MONOCYTES # BLD: 0.9 K/UL (ref 0.1–1.3)
MONOCYTES # BLD: 1 K/UL (ref 0.1–1.3)
MONOCYTES # BLD: 1 K/UL (ref 0.1–1.3)
MONOCYTES # BLD: 1.1 K/UL (ref 0.1–1.3)
MONOCYTES # BLD: 1.2 K/UL (ref 0.1–1.3)
MONOCYTES # BLD: 1.2 K/UL (ref 0.1–1.3)
MONOCYTES NFR BLD AUTO: 10 % (ref 4–12)
MONOCYTES NFR BLD AUTO: 10 % (ref 4–12)
MONOCYTES NFR BLD AUTO: 11 % (ref 4–12)
MONOCYTES NFR BLD AUTO: 11 % (ref 4–12)
MONOCYTES NFR BLD AUTO: 12 % (ref 4–12)
MONOCYTES NFR BLD AUTO: 15 % (ref 4–12)
MONOCYTES NFR BLD AUTO: 15 % (ref 4–12)
MONOCYTES NFR BLD AUTO: 5 % (ref 4–12)
MONOCYTES NFR BLD AUTO: 9 % (ref 4–12)
MONOCYTES NFR BLD MANUAL: 10 % (ref 3–9)
MONOCYTES NFR BLD MANUAL: 7 % (ref 3–9)
NEUTS SEG # BLD: 10.2 K/UL (ref 1.7–8.2)
NEUTS SEG # BLD: 11 K/UL (ref 1.7–8.2)
NEUTS SEG # BLD: 3.9 K/UL (ref 1.7–8.2)
NEUTS SEG # BLD: 4.8 K/UL (ref 1.7–8.2)
NEUTS SEG # BLD: 4.9 K/UL (ref 1.7–8.2)
NEUTS SEG # BLD: 5 K/UL (ref 1.7–8.2)
NEUTS SEG # BLD: 5.4 K/UL (ref 1.7–8.2)
NEUTS SEG # BLD: 5.7 K/UL (ref 1.7–8.2)
NEUTS SEG # BLD: 5.9 K/UL (ref 1.7–8.2)
NEUTS SEG # BLD: 5.9 K/UL (ref 1.7–8.2)
NEUTS SEG # BLD: 6.3 K/UL (ref 1.7–8.2)
NEUTS SEG # BLD: 6.4 K/UL (ref 1.7–8.2)
NEUTS SEG # BLD: 6.4 K/UL (ref 1.7–8.2)
NEUTS SEG # BLD: 6.9 K/UL (ref 1.7–8.2)
NEUTS SEG NFR BLD AUTO: 54 % (ref 43–78)
NEUTS SEG NFR BLD AUTO: 63 % (ref 43–78)
NEUTS SEG NFR BLD AUTO: 67 % (ref 43–78)
NEUTS SEG NFR BLD AUTO: 67 % (ref 43–78)
NEUTS SEG NFR BLD AUTO: 68 % (ref 43–78)
NEUTS SEG NFR BLD AUTO: 70 % (ref 43–78)
NEUTS SEG NFR BLD AUTO: 71 % (ref 43–78)
NEUTS SEG NFR BLD AUTO: 72 % (ref 43–78)
NEUTS SEG NFR BLD AUTO: 72 % (ref 43–78)
NEUTS SEG NFR BLD AUTO: 74 % (ref 43–78)
NEUTS SEG NFR BLD AUTO: 78 % (ref 43–78)
NEUTS SEG NFR BLD AUTO: 88 % (ref 43–78)
NEUTS SEG NFR BLD MANUAL: 70 % (ref 47–75)
NEUTS SEG NFR BLD MANUAL: 82 % (ref 47–75)
NITRITE UR QL STRIP.AUTO: NEGATIVE
PH UR STRIP: 6 [PH] (ref 5–9)
PHOSPHATE SERPL-MCNC: 4.4 MG/DL (ref 2.3–3.7)
PLATELET # BLD AUTO: 187 K/UL (ref 150–450)
PLATELET # BLD AUTO: 193 K/UL (ref 150–450)
PLATELET # BLD AUTO: 215 K/UL (ref 150–450)
PLATELET # BLD AUTO: 216 K/UL (ref 150–450)
PLATELET # BLD AUTO: 220 K/UL (ref 150–450)
PLATELET # BLD AUTO: 222 K/UL (ref 150–450)
PLATELET # BLD AUTO: 222 K/UL (ref 150–450)
PLATELET # BLD AUTO: 226 K/UL (ref 150–450)
PLATELET # BLD AUTO: 243 K/UL (ref 150–450)
PLATELET # BLD AUTO: 272 K/UL (ref 150–450)
PLATELET # BLD AUTO: 290 K/UL (ref 150–450)
PLATELET # BLD AUTO: 294 K/UL (ref 150–450)
PLATELET # BLD AUTO: 313 K/UL (ref 150–450)
PLATELET # BLD AUTO: 386 K/UL (ref 150–450)
PLATELET COMMENTS,PCOM: ADEQUATE
PLATELET COMMENTS,PCOM: ADEQUATE
PMV BLD AUTO: 10 FL (ref 10.8–14.1)
PMV BLD AUTO: 10.5 FL (ref 10.8–14.1)
PMV BLD AUTO: 10.7 FL (ref 10.8–14.1)
PMV BLD AUTO: 10.9 FL (ref 10.8–14.1)
PMV BLD AUTO: 11 FL (ref 10.8–14.1)
PMV BLD AUTO: 11.3 FL (ref 10.8–14.1)
PMV BLD AUTO: 11.7 FL (ref 10.8–14.1)
PMV BLD AUTO: 9.7 FL (ref 10.8–14.1)
PMV BLD AUTO: 9.9 FL (ref 10.8–14.1)
PMV BLD AUTO: 9.9 FL (ref 10.8–14.1)
POTASSIUM SERPL-SCNC: 3.1 MMOL/L (ref 3.5–5.1)
POTASSIUM SERPL-SCNC: 3.2 MMOL/L (ref 3.5–5.1)
POTASSIUM SERPL-SCNC: 3.4 MMOL/L (ref 3.5–5.1)
POTASSIUM SERPL-SCNC: 3.7 MMOL/L (ref 3.5–5.1)
POTASSIUM SERPL-SCNC: 3.8 MMOL/L (ref 3.5–5.1)
POTASSIUM SERPL-SCNC: 4.2 MMOL/L (ref 3.5–5.1)
POTASSIUM SERPL-SCNC: 4.2 MMOL/L (ref 3.5–5.1)
POTASSIUM SERPL-SCNC: 4.3 MMOL/L (ref 3.5–5.1)
POTASSIUM SERPL-SCNC: 4.3 MMOL/L (ref 3.5–5.1)
POTASSIUM SERPL-SCNC: 4.5 MMOL/L (ref 3.5–5.1)
POTASSIUM SERPL-SCNC: 4.8 MMOL/L (ref 3.5–5.1)
POTASSIUM SERPL-SCNC: 4.8 MMOL/L (ref 3.5–5.1)
POTASSIUM SERPL-SCNC: 5.1 MMOL/L (ref 3.5–5.1)
POTASSIUM SERPL-SCNC: 5.1 MMOL/L (ref 3.5–5.1)
POTASSIUM SERPL-SCNC: 5.2 MMOL/L (ref 3.5–5.1)
POTASSIUM SERPL-SCNC: 5.2 MMOL/L (ref 3.5–5.1)
POTASSIUM SERPL-SCNC: 5.6 MMOL/L (ref 3.5–5.1)
POTASSIUM SERPL-SCNC: 5.8 MMOL/L (ref 3.5–5.1)
POTASSIUM SERPL-SCNC: 5.8 MMOL/L (ref 3.5–5.1)
PROCALCITONIN SERPL-MCNC: 1 NG/ML
PROT SERPL-MCNC: 6.3 G/DL (ref 6.3–8.2)
PROT SERPL-MCNC: 6.6 G/DL (ref 6.3–8.2)
PROT SERPL-MCNC: 6.6 G/DL (ref 6.3–8.2)
PROT SERPL-MCNC: 6.7 G/DL (ref 6.3–8.2)
PROT SERPL-MCNC: 6.8 G/DL (ref 6.3–8.2)
PROT SERPL-MCNC: 6.8 G/DL (ref 6.3–8.2)
PROT SERPL-MCNC: 7 G/DL (ref 6.3–8.2)
PROT SERPL-MCNC: 7.8 G/DL (ref 6.3–8.2)
PROT SERPL-MCNC: 8.6 G/DL (ref 6.3–8.2)
PROT SERPL-MCNC: 9.4 G/DL (ref 6.3–8.2)
PROT UR STRIP-MCNC: 30 MG/DL
RBC # BLD AUTO: 2.54 M/UL (ref 4.05–5.25)
RBC # BLD AUTO: 2.7 M/UL (ref 4.05–5.25)
RBC # BLD AUTO: 2.77 M/UL (ref 4.05–5.25)
RBC # BLD AUTO: 2.83 M/UL (ref 4.05–5.25)
RBC # BLD AUTO: 2.97 M/UL (ref 4.05–5.25)
RBC # BLD AUTO: 3 M/UL (ref 4.05–5.25)
RBC # BLD AUTO: 3.25 M/UL (ref 4.05–5.25)
RBC # BLD AUTO: 3.27 M/UL (ref 4.05–5.25)
RBC # BLD AUTO: 3.32 M/UL (ref 4.05–5.25)
RBC # BLD AUTO: 3.35 M/UL (ref 4.05–5.25)
RBC # BLD AUTO: 3.43 M/UL (ref 4.05–5.25)
RBC # BLD AUTO: 3.64 M/UL (ref 4.05–5.25)
RBC # BLD AUTO: 3.75 M/UL (ref 4.05–5.25)
RBC # BLD AUTO: 3.82 M/UL (ref 4.05–5.25)
RBC #/AREA URNS HPF: ABNORMAL /HPF
RBC MORPH BLD: ABNORMAL
SERVICE CMNT-IMP: NORMAL
SODIUM SERPL-SCNC: 137 MMOL/L (ref 136–145)
SODIUM SERPL-SCNC: 137 MMOL/L (ref 136–145)
SODIUM SERPL-SCNC: 139 MMOL/L (ref 136–145)
SODIUM SERPL-SCNC: 139 MMOL/L (ref 136–145)
SODIUM SERPL-SCNC: 140 MMOL/L (ref 136–145)
SODIUM SERPL-SCNC: 141 MMOL/L (ref 136–145)
SODIUM SERPL-SCNC: 141 MMOL/L (ref 136–145)
SODIUM SERPL-SCNC: 142 MMOL/L (ref 136–145)
SODIUM SERPL-SCNC: 143 MMOL/L (ref 136–145)
SODIUM SERPL-SCNC: 144 MMOL/L (ref 136–145)
SODIUM SERPL-SCNC: 145 MMOL/L (ref 136–145)
SODIUM SERPL-SCNC: 147 MMOL/L (ref 136–145)
SODIUM SERPL-SCNC: 148 MMOL/L (ref 136–145)
SODIUM SERPL-SCNC: 149 MMOL/L (ref 136–145)
SODIUM SERPL-SCNC: 150 MMOL/L (ref 136–145)
SODIUM SERPL-SCNC: 150 MMOL/L (ref 136–145)
SODIUM SERPL-SCNC: 152 MMOL/L (ref 136–145)
SODIUM SERPL-SCNC: 153 MMOL/L (ref 136–145)
SP GR UR REFRACTOMETRY: 1.01 (ref 1–1.02)
SPECIMEN EXP DATE BLD: NORMAL
STATUS OF UNIT,%ST: NORMAL
TROPONIN I BLD-MCNC: 0.02 NG/ML (ref 0–0.08)
UNIT DIVISION, %UDIV: 0
UROBILINOGEN UR QL STRIP.AUTO: 0.2 EU/DL (ref 0.2–1)
VANCOMYCIN SERPL-MCNC: 10.4 UG/ML
VANCOMYCIN SERPL-MCNC: 18.4 UG/ML
VANCOMYCIN SERPL-MCNC: 21.4 UG/ML
VANCOMYCIN SERPL-MCNC: 22.9 UG/ML
VANCOMYCIN TROUGH SERPL-MCNC: 20.1 UG/ML (ref 5–20)
WBC # BLD AUTO: 11.6 K/UL (ref 4.3–11.1)
WBC # BLD AUTO: 13.4 K/UL (ref 4.3–11.1)
WBC # BLD AUTO: 6.9 K/UL (ref 4.3–11.1)
WBC # BLD AUTO: 7.2 K/UL (ref 4.3–11.1)
WBC # BLD AUTO: 7.2 K/UL (ref 4.3–11.1)
WBC # BLD AUTO: 7.4 K/UL (ref 4.3–11.1)
WBC # BLD AUTO: 7.6 K/UL (ref 4.3–11.1)
WBC # BLD AUTO: 8.1 K/UL (ref 4.3–11.1)
WBC # BLD AUTO: 8.4 K/UL (ref 4.3–11.1)
WBC # BLD AUTO: 8.6 K/UL (ref 4.3–11.1)
WBC # BLD AUTO: 8.9 K/UL (ref 4.3–11.1)
WBC # BLD AUTO: 9.2 K/UL (ref 4.3–11.1)
WBC # BLD AUTO: 9.4 K/UL (ref 4.3–11.1)
WBC # BLD AUTO: 9.4 K/UL (ref 4.3–11.1)
WBC URNS QL MICRO: 0 /HPF

## 2017-01-01 PROCEDURE — 71010 XR CHEST PORT: CPT

## 2017-01-01 PROCEDURE — 80202 ASSAY OF VANCOMYCIN: CPT | Performed by: NURSE PRACTITIONER

## 2017-01-01 PROCEDURE — 0651 HSPC ROUTINE HOME CARE

## 2017-01-01 PROCEDURE — G0155 HHCP-SVS OF CSW,EA 15 MIN: HCPCS

## 2017-01-01 PROCEDURE — 36415 COLL VENOUS BLD VENIPUNCTURE: CPT | Performed by: NURSE PRACTITIONER

## 2017-01-01 PROCEDURE — 74011000250 HC RX REV CODE- 250: Performed by: NURSE PRACTITIONER

## 2017-01-01 PROCEDURE — 0656 HSPC GENERAL INPATIENT

## 2017-01-01 PROCEDURE — 74177 CT ABD & PELVIS W/CONTRAST: CPT

## 2017-01-01 PROCEDURE — 74011250636 HC RX REV CODE- 250/636: Performed by: NURSE PRACTITIONER

## 2017-01-01 PROCEDURE — 83605 ASSAY OF LACTIC ACID: CPT | Performed by: NURSE PRACTITIONER

## 2017-01-01 PROCEDURE — 74011250637 HC RX REV CODE- 250/637: Performed by: NURSE PRACTITIONER

## 2017-01-01 PROCEDURE — 77030011943

## 2017-01-01 PROCEDURE — 86580 TB INTRADERMAL TEST: CPT | Performed by: INTERNAL MEDICINE

## 2017-01-01 PROCEDURE — 74011250636 HC RX REV CODE- 250/636: Performed by: FAMILY MEDICINE

## 2017-01-01 PROCEDURE — 74011250636 HC RX REV CODE- 250/636: Performed by: HOSPITALIST

## 2017-01-01 PROCEDURE — 85025 COMPLETE CBC W/AUTO DIFF WBC: CPT | Performed by: INTERNAL MEDICINE

## 2017-01-01 PROCEDURE — 80053 COMPREHEN METABOLIC PANEL: CPT | Performed by: EMERGENCY MEDICINE

## 2017-01-01 PROCEDURE — HOSPICE MEDICATION HC HH HOSPICE MEDICATION

## 2017-01-01 PROCEDURE — 74011636320 HC RX REV CODE- 636/320: Performed by: HOSPITALIST

## 2017-01-01 PROCEDURE — 65270000029 HC RM PRIVATE

## 2017-01-01 PROCEDURE — 96374 THER/PROPH/DIAG INJ IV PUSH: CPT | Performed by: EMERGENCY MEDICINE

## 2017-01-01 PROCEDURE — 74011250636 HC RX REV CODE- 250/636: Performed by: INTERNAL MEDICINE

## 2017-01-01 PROCEDURE — 82272 OCCULT BLD FECES 1-3 TESTS: CPT | Performed by: NURSE PRACTITIONER

## 2017-01-01 PROCEDURE — 77030019927 HC TBNG IRR CYSTO BAXT -A

## 2017-01-01 PROCEDURE — 74011250637 HC RX REV CODE- 250/637: Performed by: INTERNAL MEDICINE

## 2017-01-01 PROCEDURE — 87040 BLOOD CULTURE FOR BACTERIA: CPT | Performed by: NURSE PRACTITIONER

## 2017-01-01 PROCEDURE — G0299 HHS/HOSPICE OF RN EA 15 MIN: HCPCS

## 2017-01-01 PROCEDURE — 80053 COMPREHEN METABOLIC PANEL: CPT | Performed by: NURSE PRACTITIONER

## 2017-01-01 PROCEDURE — 74020 XR ABD (AP AND ERECT OR DECUB): CPT

## 2017-01-01 PROCEDURE — 77030011256 HC DRSG MEPILEX <16IN NO BORD MOLN -A

## 2017-01-01 PROCEDURE — 51701 INSERT BLADDER CATHETER: CPT | Performed by: EMERGENCY MEDICINE

## 2017-01-01 PROCEDURE — 83690 ASSAY OF LIPASE: CPT | Performed by: EMERGENCY MEDICINE

## 2017-01-01 PROCEDURE — 74022 RADEX COMPL AQT ABD SERIES: CPT

## 2017-01-01 PROCEDURE — 74011000258 HC RX REV CODE- 258: Performed by: NURSE PRACTITIONER

## 2017-01-01 PROCEDURE — 85025 COMPLETE CBC W/AUTO DIFF WBC: CPT | Performed by: NURSE PRACTITIONER

## 2017-01-01 PROCEDURE — 74176 CT ABD & PELVIS W/O CONTRAST: CPT

## 2017-01-01 PROCEDURE — 80053 COMPREHEN METABOLIC PANEL: CPT | Performed by: FAMILY MEDICINE

## 2017-01-01 PROCEDURE — 74011000250 HC RX REV CODE- 250: Performed by: INTERNAL MEDICINE

## 2017-01-01 PROCEDURE — 74011250637 HC RX REV CODE- 250/637: Performed by: PHYSICIAN ASSISTANT

## 2017-01-01 PROCEDURE — 36430 TRANSFUSION BLD/BLD COMPNT: CPT

## 2017-01-01 PROCEDURE — 80048 BASIC METABOLIC PNL TOTAL CA: CPT | Performed by: NURSE PRACTITIONER

## 2017-01-01 PROCEDURE — 80202 ASSAY OF VANCOMYCIN: CPT | Performed by: HOSPITALIST

## 2017-01-01 PROCEDURE — 77030019605

## 2017-01-01 PROCEDURE — 74011250637 HC RX REV CODE- 250/637: Performed by: FAMILY MEDICINE

## 2017-01-01 PROCEDURE — 99232 SBSQ HOSP IP/OBS MODERATE 35: CPT | Performed by: INTERNAL MEDICINE

## 2017-01-01 PROCEDURE — 97165 OT EVAL LOW COMPLEX 30 MIN: CPT

## 2017-01-01 PROCEDURE — 84100 ASSAY OF PHOSPHORUS: CPT | Performed by: FAMILY MEDICINE

## 2017-01-01 PROCEDURE — 36415 COLL VENOUS BLD VENIPUNCTURE: CPT | Performed by: INTERNAL MEDICINE

## 2017-01-01 PROCEDURE — 77030008771 HC TU NG SALEM SUMP -A

## 2017-01-01 PROCEDURE — 85025 COMPLETE CBC W/AUTO DIFF WBC: CPT | Performed by: STUDENT IN AN ORGANIZED HEALTH CARE EDUCATION/TRAINING PROGRAM

## 2017-01-01 PROCEDURE — 74000 XR ABD (KUB): CPT

## 2017-01-01 PROCEDURE — 85025 COMPLETE CBC W/AUTO DIFF WBC: CPT | Performed by: FAMILY MEDICINE

## 2017-01-01 PROCEDURE — 77030027138 HC INCENT SPIROMETER -A

## 2017-01-01 PROCEDURE — 87493 C DIFF AMPLIFIED PROBE: CPT | Performed by: NURSE PRACTITIONER

## 2017-01-01 PROCEDURE — 74011636320 HC RX REV CODE- 636/320: Performed by: STUDENT IN AN ORGANIZED HEALTH CARE EDUCATION/TRAINING PROGRAM

## 2017-01-01 PROCEDURE — 74011000302 HC RX REV CODE- 302: Performed by: INTERNAL MEDICINE

## 2017-01-01 PROCEDURE — 86923 COMPATIBILITY TEST ELECTRIC: CPT | Performed by: NURSE PRACTITIONER

## 2017-01-01 PROCEDURE — 36415 COLL VENOUS BLD VENIPUNCTURE: CPT | Performed by: FAMILY MEDICINE

## 2017-01-01 PROCEDURE — 83605 ASSAY OF LACTIC ACID: CPT

## 2017-01-01 PROCEDURE — 84145 PROCALCITONIN (PCT): CPT | Performed by: NURSE PRACTITIONER

## 2017-01-01 PROCEDURE — 74011250636 HC RX REV CODE- 250/636: Performed by: EMERGENCY MEDICINE

## 2017-01-01 PROCEDURE — 97161 PT EVAL LOW COMPLEX 20 MIN: CPT

## 2017-01-01 PROCEDURE — 84484 ASSAY OF TROPONIN QUANT: CPT

## 2017-01-01 PROCEDURE — 83735 ASSAY OF MAGNESIUM: CPT | Performed by: NURSE PRACTITIONER

## 2017-01-01 PROCEDURE — 74011000250 HC RX REV CODE- 250: Performed by: FAMILY MEDICINE

## 2017-01-01 PROCEDURE — 96361 HYDRATE IV INFUSION ADD-ON: CPT | Performed by: EMERGENCY MEDICINE

## 2017-01-01 PROCEDURE — 86900 BLOOD TYPING SEROLOGIC ABO: CPT | Performed by: NURSE PRACTITIONER

## 2017-01-01 PROCEDURE — 0D9670Z DRAINAGE OF STOMACH WITH DRAINAGE DEVICE, VIA NATURAL OR ARTIFICIAL OPENING: ICD-10-PCS | Performed by: INTERNAL MEDICINE

## 2017-01-01 PROCEDURE — 81001 URINALYSIS AUTO W/SCOPE: CPT | Performed by: NURSE PRACTITIONER

## 2017-01-01 PROCEDURE — 99284 EMERGENCY DEPT VISIT MOD MDM: CPT | Performed by: EMERGENCY MEDICINE

## 2017-01-01 PROCEDURE — 83735 ASSAY OF MAGNESIUM: CPT | Performed by: FAMILY MEDICINE

## 2017-01-01 PROCEDURE — 99285 EMERGENCY DEPT VISIT HI MDM: CPT | Performed by: EMERGENCY MEDICINE

## 2017-01-01 PROCEDURE — 77030013131 HC IV BLD ST ICUM -A

## 2017-01-01 PROCEDURE — 81003 URINALYSIS AUTO W/O SCOPE: CPT | Performed by: EMERGENCY MEDICINE

## 2017-01-01 PROCEDURE — 74011250636 HC RX REV CODE- 250/636: Performed by: STUDENT IN AN ORGANIZED HEALTH CARE EDUCATION/TRAINING PROGRAM

## 2017-01-01 PROCEDURE — 96375 TX/PRO/DX INJ NEW DRUG ADDON: CPT | Performed by: EMERGENCY MEDICINE

## 2017-01-01 PROCEDURE — 85018 HEMOGLOBIN: CPT | Performed by: NURSE PRACTITIONER

## 2017-01-01 PROCEDURE — 85025 COMPLETE CBC W/AUTO DIFF WBC: CPT | Performed by: EMERGENCY MEDICINE

## 2017-01-01 PROCEDURE — 84132 ASSAY OF SERUM POTASSIUM: CPT | Performed by: EMERGENCY MEDICINE

## 2017-01-01 PROCEDURE — 80053 COMPREHEN METABOLIC PANEL: CPT | Performed by: STUDENT IN AN ORGANIZED HEALTH CARE EDUCATION/TRAINING PROGRAM

## 2017-01-01 PROCEDURE — 74011636320 HC RX REV CODE- 636/320: Performed by: EMERGENCY MEDICINE

## 2017-01-01 PROCEDURE — 80048 BASIC METABOLIC PNL TOTAL CA: CPT | Performed by: INTERNAL MEDICINE

## 2017-01-01 PROCEDURE — 0D9670Z DRAINAGE OF STOMACH WITH DRAINAGE DEVICE, VIA NATURAL OR ARTIFICIAL OPENING: ICD-10-PCS | Performed by: FAMILY MEDICINE

## 2017-01-01 PROCEDURE — 80053 COMPREHEN METABOLIC PANEL: CPT | Performed by: INTERNAL MEDICINE

## 2017-01-01 PROCEDURE — 30233N1 TRANSFUSION OF NONAUTOLOGOUS RED BLOOD CELLS INTO PERIPHERAL VEIN, PERCUTANEOUS APPROACH: ICD-10-PCS | Performed by: NURSE PRACTITIONER

## 2017-01-01 PROCEDURE — 3336500001 HSPC ELECTION

## 2017-01-01 PROCEDURE — P9016 RBC LEUKOCYTES REDUCED: HCPCS | Performed by: NURSE PRACTITIONER

## 2017-01-01 PROCEDURE — 83690 ASSAY OF LIPASE: CPT | Performed by: STUDENT IN AN ORGANIZED HEALTH CARE EDUCATION/TRAINING PROGRAM

## 2017-01-01 RX ORDER — SODIUM CHLORIDE 0.9 % (FLUSH) 0.9 %
5-10 SYRINGE (ML) INJECTION AS NEEDED
Status: DISCONTINUED | OUTPATIENT
Start: 2017-01-01 | End: 2017-01-01 | Stop reason: HOSPADM

## 2017-01-01 RX ORDER — POLYETHYLENE GLYCOL 3350 17 G/17G
17 POWDER, FOR SOLUTION ORAL DAILY PRN
Status: DISCONTINUED | OUTPATIENT
Start: 2017-01-01 | End: 2017-01-01 | Stop reason: HOSPADM

## 2017-01-01 RX ORDER — POTASSIUM CHLORIDE 14.9 MG/ML
20 INJECTION INTRAVENOUS
Status: DISPENSED | OUTPATIENT
Start: 2017-01-01 | End: 2017-01-01

## 2017-01-01 RX ORDER — HALOPERIDOL 5 MG/ML
2 INJECTION INTRAMUSCULAR
Status: DISCONTINUED | OUTPATIENT
Start: 2017-01-01 | End: 2017-01-01 | Stop reason: HOSPADM

## 2017-01-01 RX ORDER — AMLODIPINE BESYLATE 5 MG/1
5 TABLET ORAL DAILY
Status: DISCONTINUED | OUTPATIENT
Start: 2017-01-01 | End: 2017-01-01

## 2017-01-01 RX ORDER — UREA 10 %
2 LOTION (ML) TOPICAL 2 TIMES DAILY
Status: DISCONTINUED | OUTPATIENT
Start: 2017-01-01 | End: 2017-01-01 | Stop reason: HOSPADM

## 2017-01-01 RX ORDER — GLYCOPYRROLATE 0.2 MG/ML
0.2 INJECTION INTRAMUSCULAR; INTRAVENOUS
Status: DISCONTINUED | OUTPATIENT
Start: 2017-01-01 | End: 2017-01-01

## 2017-01-01 RX ORDER — LORAZEPAM 0.5 MG/1
0.5 TABLET ORAL
Status: DISCONTINUED | OUTPATIENT
Start: 2017-01-01 | End: 2017-01-01

## 2017-01-01 RX ORDER — OXYCODONE HYDROCHLORIDE 5 MG/1
2.5 TABLET ORAL
Qty: 10 TAB | Refills: 0 | Status: SHIPPED
Start: 2017-01-01 | End: 2017-01-01 | Stop reason: DRUGHIGH

## 2017-01-01 RX ORDER — DIPHENHYDRAMINE HYDROCHLORIDE 50 MG/ML
12.5 INJECTION, SOLUTION INTRAMUSCULAR; INTRAVENOUS
Status: DISCONTINUED | OUTPATIENT
Start: 2017-01-01 | End: 2017-01-01 | Stop reason: HOSPADM

## 2017-01-01 RX ORDER — MORPHINE SULFATE 2 MG/ML
2 INJECTION, SOLUTION INTRAMUSCULAR; INTRAVENOUS
Status: DISCONTINUED | OUTPATIENT
Start: 2017-01-01 | End: 2017-01-01 | Stop reason: HOSPADM

## 2017-01-01 RX ORDER — POTASSIUM CHLORIDE 20 MEQ/1
40 TABLET, EXTENDED RELEASE ORAL
Status: COMPLETED | OUTPATIENT
Start: 2017-01-01 | End: 2017-01-01

## 2017-01-01 RX ORDER — PROMETHAZINE HYDROCHLORIDE 25 MG/1
25 SUPPOSITORY RECTAL
Qty: 15 SUPPOSITORY | Refills: 0 | Status: SHIPPED | OUTPATIENT
Start: 2017-01-01 | End: 2017-01-01

## 2017-01-01 RX ORDER — OXYCODONE HYDROCHLORIDE 5 MG/1
2.5 TABLET ORAL
Status: DISCONTINUED | OUTPATIENT
Start: 2017-01-01 | End: 2017-01-01 | Stop reason: HOSPADM

## 2017-01-01 RX ORDER — POLYETHYLENE GLYCOL 3350 17 G/17G
17 POWDER, FOR SOLUTION ORAL DAILY
Qty: 3 PACKET | Refills: 0 | Status: SHIPPED
Start: 2017-01-01 | End: 2017-01-01

## 2017-01-01 RX ORDER — POLYVINYL ALCOHOL 14 MG/ML
1 SOLUTION/ DROPS OPHTHALMIC AS NEEDED
Status: DISCONTINUED | OUTPATIENT
Start: 2017-01-01 | End: 2017-01-01 | Stop reason: HOSPADM

## 2017-01-01 RX ORDER — HEPARIN SODIUM 5000 [USP'U]/ML
5000 INJECTION, SOLUTION INTRAVENOUS; SUBCUTANEOUS EVERY 8 HOURS
Status: DISCONTINUED | OUTPATIENT
Start: 2017-01-01 | End: 2017-01-01

## 2017-01-01 RX ORDER — TIMOLOL MALEATE 5 MG/ML
1 SOLUTION/ DROPS OPHTHALMIC DAILY
Status: DISCONTINUED | OUTPATIENT
Start: 2017-01-01 | End: 2017-01-01 | Stop reason: HOSPADM

## 2017-01-01 RX ORDER — MORPHINE SULFATE 4 MG/ML
2 INJECTION, SOLUTION INTRAMUSCULAR; INTRAVENOUS
Status: COMPLETED | OUTPATIENT
Start: 2017-01-01 | End: 2017-01-01

## 2017-01-01 RX ORDER — PROMETHAZINE HYDROCHLORIDE 25 MG/1
6.25 TABLET ORAL
Status: DISCONTINUED | OUTPATIENT
Start: 2017-01-01 | End: 2017-01-01

## 2017-01-01 RX ORDER — AMLODIPINE BESYLATE 10 MG/1
10 TABLET ORAL DAILY
Qty: 30 TAB | Refills: 0 | Status: SHIPPED | OUTPATIENT
Start: 2017-01-01 | End: 2017-01-01

## 2017-01-01 RX ORDER — MORPHINE SULFATE 2 MG/ML
2 INJECTION, SOLUTION INTRAMUSCULAR; INTRAVENOUS
Status: COMPLETED | OUTPATIENT
Start: 2017-01-01 | End: 2017-01-01

## 2017-01-01 RX ORDER — SODIUM CHLORIDE 450 MG/100ML
100 INJECTION, SOLUTION INTRAVENOUS CONTINUOUS
Status: DISCONTINUED | OUTPATIENT
Start: 2017-01-01 | End: 2017-01-01

## 2017-01-01 RX ORDER — SODIUM CHLORIDE 9 MG/ML
75 INJECTION, SOLUTION INTRAVENOUS CONTINUOUS
Status: DISCONTINUED | OUTPATIENT
Start: 2017-01-01 | End: 2017-01-01 | Stop reason: HOSPADM

## 2017-01-01 RX ORDER — LATANOPROST 50 UG/ML
1 SOLUTION/ DROPS OPHTHALMIC
Status: DISCONTINUED | OUTPATIENT
Start: 2017-01-01 | End: 2017-01-01 | Stop reason: HOSPADM

## 2017-01-01 RX ORDER — LATANOPROST 50 UG/ML
1 SOLUTION/ DROPS OPHTHALMIC
Qty: 1 ML | Refills: 0 | Status: SHIPPED
Start: 2017-01-01 | End: 2017-01-01 | Stop reason: SDUPTHER

## 2017-01-01 RX ORDER — SODIUM CHLORIDE 0.9 % (FLUSH) 0.9 %
10 SYRINGE (ML) INJECTION
Status: ACTIVE | OUTPATIENT
Start: 2017-01-01 | End: 2017-01-01

## 2017-01-01 RX ORDER — MORPHINE SULFATE 2 MG/ML
2 INJECTION, SOLUTION INTRAMUSCULAR; INTRAVENOUS
Status: DISCONTINUED | OUTPATIENT
Start: 2017-01-01 | End: 2017-01-01

## 2017-01-01 RX ORDER — LATANOPROST 50 UG/ML
1 SOLUTION/ DROPS OPHTHALMIC EVERY EVENING
Status: DISCONTINUED | OUTPATIENT
Start: 2017-01-01 | End: 2017-01-01 | Stop reason: HOSPADM

## 2017-01-01 RX ORDER — SODIUM CHLORIDE 0.9 % (FLUSH) 0.9 %
3 SYRINGE (ML) INJECTION AS NEEDED
Status: DISCONTINUED | OUTPATIENT
Start: 2017-01-01 | End: 2017-01-01

## 2017-01-01 RX ORDER — PROMETHAZINE HYDROCHLORIDE 25 MG/1
25 SUPPOSITORY RECTAL
Status: DISCONTINUED | OUTPATIENT
Start: 2017-01-01 | End: 2017-01-01 | Stop reason: HOSPADM

## 2017-01-01 RX ORDER — LIDOCAINE HYDROCHLORIDE 20 MG/ML
JELLY TOPICAL AS NEEDED
Status: DISCONTINUED | OUTPATIENT
Start: 2017-01-01 | End: 2017-01-01 | Stop reason: HOSPADM

## 2017-01-01 RX ORDER — ONDANSETRON 2 MG/ML
4 INJECTION INTRAMUSCULAR; INTRAVENOUS
Status: COMPLETED | OUTPATIENT
Start: 2017-01-01 | End: 2017-01-01

## 2017-01-01 RX ORDER — LORAZEPAM 0.5 MG/1
0.25 TABLET ORAL
Status: DISCONTINUED | OUTPATIENT
Start: 2017-01-01 | End: 2017-01-01 | Stop reason: HOSPADM

## 2017-01-01 RX ORDER — LORAZEPAM 2 MG/ML
0.5 INJECTION INTRAMUSCULAR
Status: DISCONTINUED | OUTPATIENT
Start: 2017-01-01 | End: 2017-01-01

## 2017-01-01 RX ORDER — ACETAMINOPHEN 650 MG/1
650 SUPPOSITORY RECTAL
Status: DISCONTINUED | OUTPATIENT
Start: 2017-01-01 | End: 2017-01-01 | Stop reason: HOSPADM

## 2017-01-01 RX ORDER — HALOPERIDOL 5 MG/ML
2 INJECTION INTRAMUSCULAR
Status: DISCONTINUED | OUTPATIENT
Start: 2017-01-01 | End: 2017-01-01

## 2017-01-01 RX ORDER — PROMETHAZINE HYDROCHLORIDE 25 MG/ML
6.25 INJECTION, SOLUTION INTRAMUSCULAR; INTRAVENOUS
Status: DISCONTINUED | OUTPATIENT
Start: 2017-01-01 | End: 2017-01-01 | Stop reason: HOSPADM

## 2017-01-01 RX ORDER — SODIUM CHLORIDE 9 MG/ML
100 INJECTION, SOLUTION INTRAVENOUS CONTINUOUS
Status: DISCONTINUED | OUTPATIENT
Start: 2017-01-01 | End: 2017-01-01

## 2017-01-01 RX ORDER — LORAZEPAM 0.5 MG/1
0.25 TABLET ORAL
Qty: 5 TAB | Refills: 0 | Status: SHIPPED | OUTPATIENT
Start: 2017-01-01 | End: 2017-01-01 | Stop reason: DRUGHIGH

## 2017-01-01 RX ORDER — FACIAL-BODY WIPES
10 EACH TOPICAL DAILY PRN
Status: DISCONTINUED | OUTPATIENT
Start: 2017-01-01 | End: 2017-01-01

## 2017-01-01 RX ORDER — LORAZEPAM 2 MG/ML
1 INJECTION INTRAMUSCULAR
Status: DISCONTINUED | OUTPATIENT
Start: 2017-01-01 | End: 2017-01-01

## 2017-01-01 RX ORDER — POTASSIUM CHLORIDE 14.9 MG/ML
20 INJECTION INTRAVENOUS
Status: COMPLETED | OUTPATIENT
Start: 2017-01-01 | End: 2017-01-01

## 2017-01-01 RX ORDER — LORAZEPAM 2 MG/ML
0.25 INJECTION INTRAMUSCULAR
Status: DISCONTINUED | OUTPATIENT
Start: 2017-01-01 | End: 2017-01-01

## 2017-01-01 RX ORDER — FACIAL-BODY WIPES
10 EACH TOPICAL DAILY PRN
Status: DISCONTINUED | OUTPATIENT
Start: 2017-01-01 | End: 2017-01-01 | Stop reason: HOSPADM

## 2017-01-01 RX ORDER — HEPARIN SODIUM 5000 [USP'U]/ML
5000 INJECTION, SOLUTION INTRAVENOUS; SUBCUTANEOUS EVERY 8 HOURS
Status: DISCONTINUED | OUTPATIENT
Start: 2017-01-01 | End: 2017-01-01 | Stop reason: HOSPADM

## 2017-01-01 RX ORDER — FACIAL-BODY WIPES
10 EACH TOPICAL AS NEEDED
Status: DISCONTINUED | OUTPATIENT
Start: 2017-01-01 | End: 2017-01-01 | Stop reason: HOSPADM

## 2017-01-01 RX ORDER — GLYCOPYRROLATE 0.2 MG/ML
0.2 INJECTION INTRAMUSCULAR; INTRAVENOUS
Status: DISCONTINUED | OUTPATIENT
Start: 2017-01-01 | End: 2017-01-01 | Stop reason: HOSPADM

## 2017-01-01 RX ORDER — LOPERAMIDE HYDROCHLORIDE 2 MG/1
2 CAPSULE ORAL AS NEEDED
Status: DISCONTINUED | OUTPATIENT
Start: 2017-01-01 | End: 2017-01-01

## 2017-01-01 RX ORDER — TIMOLOL MALEATE 5 MG/ML
1 SOLUTION/ DROPS OPHTHALMIC 2 TIMES DAILY
Status: DISCONTINUED | OUTPATIENT
Start: 2017-01-01 | End: 2017-01-01

## 2017-01-01 RX ORDER — TIMOLOL MALEATE 5 MG/ML
1 SOLUTION/ DROPS OPHTHALMIC
Status: DISCONTINUED | OUTPATIENT
Start: 2017-01-01 | End: 2017-01-01 | Stop reason: HOSPADM

## 2017-01-01 RX ORDER — POLYETHYLENE GLYCOL 3350 17 G/17G
17 POWDER, FOR SOLUTION ORAL ONCE
Status: COMPLETED | OUTPATIENT
Start: 2017-01-01 | End: 2017-01-01

## 2017-01-01 RX ORDER — LEVOFLOXACIN 5 MG/ML
750 INJECTION, SOLUTION INTRAVENOUS
Status: DISCONTINUED | OUTPATIENT
Start: 2017-01-01 | End: 2017-01-01

## 2017-01-01 RX ORDER — AMLODIPINE BESYLATE 10 MG/1
10 TABLET ORAL DAILY
Status: DISCONTINUED | OUTPATIENT
Start: 2017-01-01 | End: 2017-01-01 | Stop reason: HOSPADM

## 2017-01-01 RX ORDER — ACETAMINOPHEN 325 MG/1
650 TABLET ORAL
Status: DISCONTINUED | OUTPATIENT
Start: 2017-01-01 | End: 2017-01-01 | Stop reason: HOSPADM

## 2017-01-01 RX ORDER — ONDANSETRON 2 MG/ML
4 INJECTION INTRAMUSCULAR; INTRAVENOUS
Status: DISCONTINUED | OUTPATIENT
Start: 2017-01-01 | End: 2017-01-01 | Stop reason: HOSPADM

## 2017-01-01 RX ORDER — POLYETHYLENE GLYCOL 3350 17 G/17G
17 POWDER, FOR SOLUTION ORAL DAILY PRN
Status: DISCONTINUED | OUTPATIENT
Start: 2017-01-01 | End: 2017-01-01

## 2017-01-01 RX ORDER — LORAZEPAM 0.5 MG/1
0.25 TABLET ORAL
Qty: 5 TAB | Refills: 0 | Status: SHIPPED | OUTPATIENT
Start: 2017-01-01 | End: 2017-01-01

## 2017-01-01 RX ORDER — SODIUM CHLORIDE 0.9 % (FLUSH) 0.9 %
5-10 SYRINGE (ML) INJECTION EVERY 8 HOURS
Status: DISCONTINUED | OUTPATIENT
Start: 2017-01-01 | End: 2017-01-01 | Stop reason: HOSPADM

## 2017-01-01 RX ORDER — SODIUM CHLORIDE 0.9 % (FLUSH) 0.9 %
3 SYRINGE (ML) INJECTION EVERY 12 HOURS
Status: DISCONTINUED | OUTPATIENT
Start: 2017-01-01 | End: 2017-01-01

## 2017-01-01 RX ORDER — SODIUM CHLORIDE 9 MG/ML
250 INJECTION, SOLUTION INTRAVENOUS AS NEEDED
Status: DISCONTINUED | OUTPATIENT
Start: 2017-01-01 | End: 2017-01-01 | Stop reason: HOSPADM

## 2017-01-01 RX ORDER — TRAMADOL HYDROCHLORIDE 50 MG/1
50 TABLET ORAL
Qty: 30 TAB | Refills: 0 | Status: SHIPPED | OUTPATIENT
Start: 2017-01-01 | End: 2017-01-01

## 2017-01-01 RX ORDER — HALOPERIDOL 2 MG/ML
2 SOLUTION ORAL
Status: DISCONTINUED | OUTPATIENT
Start: 2017-01-01 | End: 2017-01-01

## 2017-01-01 RX ORDER — POTASSIUM CHLORIDE 20MEQ/15ML
40 LIQUID (ML) ORAL DAILY
Status: DISCONTINUED | OUTPATIENT
Start: 2017-01-01 | End: 2017-01-01

## 2017-01-01 RX ORDER — HYDRALAZINE HYDROCHLORIDE 20 MG/ML
10 INJECTION INTRAMUSCULAR; INTRAVENOUS
Status: DISCONTINUED | OUTPATIENT
Start: 2017-01-01 | End: 2017-01-01 | Stop reason: HOSPADM

## 2017-01-01 RX ORDER — POLYVINYL ALCOHOL 14 MG/ML
1 SOLUTION/ DROPS OPHTHALMIC AS NEEDED
Qty: 10 ML | Refills: 0 | Status: SHIPPED
Start: 2017-01-01 | End: 2017-01-01

## 2017-01-01 RX ORDER — DEXTROSE, SODIUM CHLORIDE, AND POTASSIUM CHLORIDE 5; .45; .15 G/100ML; G/100ML; G/100ML
100 INJECTION INTRAVENOUS CONTINUOUS
Status: DISCONTINUED | OUTPATIENT
Start: 2017-01-01 | End: 2017-01-01 | Stop reason: HOSPADM

## 2017-01-01 RX ORDER — FACIAL-BODY WIPES
10 EACH TOPICAL ONCE
Status: COMPLETED | OUTPATIENT
Start: 2017-01-01 | End: 2017-01-01

## 2017-01-01 RX ORDER — METOPROLOL TARTRATE 50 MG/1
50 TABLET ORAL 2 TIMES DAILY
Status: DISCONTINUED | OUTPATIENT
Start: 2017-01-01 | End: 2017-01-01 | Stop reason: HOSPADM

## 2017-01-01 RX ORDER — FACIAL-BODY WIPES
10 EACH TOPICAL
Qty: 3 SUPPOSITORY | Refills: 0 | Status: SHIPPED | OUTPATIENT
Start: 2017-01-01 | End: 2017-01-01

## 2017-01-01 RX ORDER — ASPIRIN 81 MG/1
81 TABLET ORAL DAILY
Status: DISCONTINUED | OUTPATIENT
Start: 2017-01-01 | End: 2017-01-01 | Stop reason: HOSPADM

## 2017-01-01 RX ADMIN — HEPARIN SODIUM 5000 UNITS: 5000 INJECTION, SOLUTION INTRAVENOUS; SUBCUTANEOUS at 14:13

## 2017-01-01 RX ADMIN — HEPARIN SODIUM 5000 UNITS: 5000 INJECTION, SOLUTION INTRAVENOUS; SUBCUTANEOUS at 06:13

## 2017-01-01 RX ADMIN — MORPHINE SULFATE 2 MG: 2 INJECTION, SOLUTION INTRAMUSCULAR; INTRAVENOUS at 05:01

## 2017-01-01 RX ADMIN — ACETAMINOPHEN 650 MG: 325 TABLET, FILM COATED ORAL at 16:40

## 2017-01-01 RX ADMIN — POTASSIUM CHLORIDE 20 MEQ: 14.9 INJECTION, SOLUTION INTRAVENOUS at 21:20

## 2017-01-01 RX ADMIN — Medication 5 ML: at 21:13

## 2017-01-01 RX ADMIN — HEPARIN SODIUM 5000 UNITS: 5000 INJECTION, SOLUTION INTRAVENOUS; SUBCUTANEOUS at 06:27

## 2017-01-01 RX ADMIN — Medication 10 ML: at 22:39

## 2017-01-01 RX ADMIN — SODIUM CHLORIDE, PRESERVATIVE FREE 3 ML: 5 INJECTION INTRAVENOUS at 09:21

## 2017-01-01 RX ADMIN — LATANOPROST 1 DROP: 50 SOLUTION OPHTHALMIC at 22:00

## 2017-01-01 RX ADMIN — SODIUM CHLORIDE 75 ML/HR: 900 INJECTION, SOLUTION INTRAVENOUS at 23:38

## 2017-01-01 RX ADMIN — POTASSIUM CHLORIDE 40 MEQ: 20 SOLUTION ORAL at 09:06

## 2017-01-01 RX ADMIN — BISACODYL 10 MG: 10 SUPPOSITORY RECTAL at 15:16

## 2017-01-01 RX ADMIN — POLYVINYL ALCOHOL 1 DROP: 14 SOLUTION/ DROPS OPHTHALMIC at 08:43

## 2017-01-01 RX ADMIN — HYDRALAZINE HYDROCHLORIDE 10 MG: 20 INJECTION INTRAMUSCULAR; INTRAVENOUS at 04:35

## 2017-01-01 RX ADMIN — SODIUM CHLORIDE, PRESERVATIVE FREE 3 ML: 5 INJECTION INTRAVENOUS at 08:50

## 2017-01-01 RX ADMIN — ONDANSETRON HYDROCHLORIDE 4 MG: 2 SOLUTION INTRAMUSCULAR; INTRAVENOUS at 22:20

## 2017-01-01 RX ADMIN — HEPARIN SODIUM 5000 UNITS: 5000 INJECTION, SOLUTION INTRAVENOUS; SUBCUTANEOUS at 17:52

## 2017-01-01 RX ADMIN — HEPARIN SODIUM 5000 UNITS: 5000 INJECTION, SOLUTION INTRAVENOUS; SUBCUTANEOUS at 06:22

## 2017-01-01 RX ADMIN — HYDRALAZINE HYDROCHLORIDE 10 MG: 20 INJECTION INTRAMUSCULAR; INTRAVENOUS at 08:25

## 2017-01-01 RX ADMIN — SODIUM CHLORIDE, PRESERVATIVE FREE 3 ML: 5 INJECTION INTRAVENOUS at 09:06

## 2017-01-01 RX ADMIN — HEPARIN SODIUM 5000 UNITS: 5000 INJECTION, SOLUTION INTRAVENOUS; SUBCUTANEOUS at 18:00

## 2017-01-01 RX ADMIN — TIMOLOL MALEATE 1 DROP: 5 SOLUTION OPHTHALMIC at 08:57

## 2017-01-01 RX ADMIN — POLYETHYLENE GLYCOL 3350 17 G: 17 POWDER, FOR SOLUTION ORAL at 15:45

## 2017-01-01 RX ADMIN — HEPARIN SODIUM 5000 UNITS: 5000 INJECTION, SOLUTION INTRAVENOUS; SUBCUTANEOUS at 02:00

## 2017-01-01 RX ADMIN — LATANOPROST 1 DROP: 50 SOLUTION/ DROPS OPHTHALMIC at 22:00

## 2017-01-01 RX ADMIN — TIMOLOL MALEATE 1 DROP: 5 SOLUTION OPHTHALMIC at 08:39

## 2017-01-01 RX ADMIN — LORAZEPAM 0.25 MG: 0.5 TABLET ORAL at 19:16

## 2017-01-01 RX ADMIN — HEPARIN SODIUM 5000 UNITS: 5000 INJECTION, SOLUTION INTRAVENOUS; SUBCUTANEOUS at 13:57

## 2017-01-01 RX ADMIN — POTASSIUM CHLORIDE 40 MEQ: 20 SOLUTION ORAL at 10:17

## 2017-01-01 RX ADMIN — HEPARIN SODIUM 5000 UNITS: 5000 INJECTION, SOLUTION INTRAVENOUS; SUBCUTANEOUS at 22:50

## 2017-01-01 RX ADMIN — HEPARIN SODIUM 5000 UNITS: 5000 INJECTION, SOLUTION INTRAVENOUS; SUBCUTANEOUS at 18:38

## 2017-01-01 RX ADMIN — MORPHINE SULFATE 2 MG: 2 INJECTION, SOLUTION INTRAMUSCULAR; INTRAVENOUS at 20:50

## 2017-01-01 RX ADMIN — Medication 10 ML: at 17:16

## 2017-01-01 RX ADMIN — HYDRALAZINE HYDROCHLORIDE 10 MG: 20 INJECTION INTRAMUSCULAR; INTRAVENOUS at 12:43

## 2017-01-01 RX ADMIN — Medication 10 ML: at 21:42

## 2017-01-01 RX ADMIN — POTASSIUM CHLORIDE 40 MEQ: 20 SOLUTION ORAL at 08:30

## 2017-01-01 RX ADMIN — LORAZEPAM 0.5 MG: 0.5 TABLET ORAL at 21:35

## 2017-01-01 RX ADMIN — PIPERACILLIN SODIUM,TAZOBACTAM SODIUM 4.5 G: 4; .5 INJECTION, POWDER, FOR SOLUTION INTRAVENOUS at 09:23

## 2017-01-01 RX ADMIN — LACTOBACILLUS TAB 2 TABLET: TAB at 08:29

## 2017-01-01 RX ADMIN — LORAZEPAM 0.25 MG: 0.5 TABLET ORAL at 17:08

## 2017-01-01 RX ADMIN — METOPROLOL TARTRATE 50 MG: 50 TABLET ORAL at 17:35

## 2017-01-01 RX ADMIN — POTASSIUM CHLORIDE 20 MEQ: 200 INJECTION, SOLUTION INTRAVENOUS at 10:54

## 2017-01-01 RX ADMIN — HYDRALAZINE HYDROCHLORIDE 10 MG: 20 INJECTION INTRAMUSCULAR; INTRAVENOUS at 23:30

## 2017-01-01 RX ADMIN — AMLODIPINE BESYLATE 10 MG: 10 TABLET ORAL at 08:29

## 2017-01-01 RX ADMIN — METOPROLOL TARTRATE 50 MG: 50 TABLET ORAL at 08:21

## 2017-01-01 RX ADMIN — SODIUM CHLORIDE 1000 ML: 900 INJECTION, SOLUTION INTRAVENOUS at 21:51

## 2017-01-01 RX ADMIN — METOPROLOL TARTRATE 50 MG: 50 TABLET ORAL at 18:25

## 2017-01-01 RX ADMIN — LACTOBACILLUS TAB 2 TABLET: TAB at 17:51

## 2017-01-01 RX ADMIN — AMLODIPINE BESYLATE 10 MG: 10 TABLET ORAL at 09:06

## 2017-01-01 RX ADMIN — HEPARIN SODIUM 5000 UNITS: 5000 INJECTION, SOLUTION INTRAVENOUS; SUBCUTANEOUS at 10:16

## 2017-01-01 RX ADMIN — HEPARIN SODIUM 5000 UNITS: 5000 INJECTION, SOLUTION INTRAVENOUS; SUBCUTANEOUS at 06:25

## 2017-01-01 RX ADMIN — BISACODYL 10 MG: 10 SUPPOSITORY RECTAL at 21:56

## 2017-01-01 RX ADMIN — DEXTROSE MONOHYDRATE, SODIUM CHLORIDE, AND POTASSIUM CHLORIDE 100 ML/HR: 50; 4.5; 1.49 INJECTION, SOLUTION INTRAVENOUS at 22:13

## 2017-01-01 RX ADMIN — HEPARIN SODIUM: 5000 INJECTION, SOLUTION INTRAVENOUS; SUBCUTANEOUS at 12:41

## 2017-01-01 RX ADMIN — LATANOPROST 1 DROP: 50 SOLUTION OPHTHALMIC at 22:49

## 2017-01-01 RX ADMIN — PIPERACILLIN SODIUM,TAZOBACTAM SODIUM 4.5 G: 4; .5 INJECTION, POWDER, FOR SOLUTION INTRAVENOUS at 20:26

## 2017-01-01 RX ADMIN — PIPERACILLIN SODIUM,TAZOBACTAM SODIUM 4.5 G: 4; .5 INJECTION, POWDER, FOR SOLUTION INTRAVENOUS at 23:30

## 2017-01-01 RX ADMIN — HEPARIN SODIUM 5000 UNITS: 5000 INJECTION, SOLUTION INTRAVENOUS; SUBCUTANEOUS at 06:31

## 2017-01-01 RX ADMIN — MORPHINE SULFATE 2 MG: 4 INJECTION, SOLUTION INTRAMUSCULAR; INTRAVENOUS at 21:13

## 2017-01-01 RX ADMIN — PIPERACILLIN SODIUM,TAZOBACTAM SODIUM 4.5 G: 4; .5 INJECTION, POWDER, FOR SOLUTION INTRAVENOUS at 09:07

## 2017-01-01 RX ADMIN — LORAZEPAM 0.25 MG: 0.5 TABLET ORAL at 20:17

## 2017-01-01 RX ADMIN — SODIUM CHLORIDE 500 ML: 900 INJECTION, SOLUTION INTRAVENOUS at 21:13

## 2017-01-01 RX ADMIN — ASPIRIN 81 MG: 81 TABLET, COATED ORAL at 08:29

## 2017-01-01 RX ADMIN — MORPHINE SULFATE 2 MG: 2 INJECTION, SOLUTION INTRAMUSCULAR; INTRAVENOUS at 13:45

## 2017-01-01 RX ADMIN — SODIUM CHLORIDE 100 ML/HR: 450 INJECTION, SOLUTION INTRAVENOUS at 08:25

## 2017-01-01 RX ADMIN — SODIUM CHLORIDE, PRESERVATIVE FREE 3 ML: 5 INJECTION INTRAVENOUS at 13:25

## 2017-01-01 RX ADMIN — METOPROLOL TARTRATE 50 MG: 50 TABLET ORAL at 10:22

## 2017-01-01 RX ADMIN — TUBERCULIN PURIFIED PROTEIN DERIVATIVE 5 UNITS: 5 INJECTION, SOLUTION INTRADERMAL at 13:55

## 2017-01-01 RX ADMIN — HEPARIN SODIUM 5000 UNITS: 5000 INJECTION, SOLUTION INTRAVENOUS; SUBCUTANEOUS at 21:36

## 2017-01-01 RX ADMIN — VANCOMYCIN HYDROCHLORIDE 1000 MG: 1 INJECTION, POWDER, LYOPHILIZED, FOR SOLUTION INTRAVENOUS at 18:04

## 2017-01-01 RX ADMIN — LATANOPROST 1 DROP: 50 SOLUTION OPHTHALMIC at 22:36

## 2017-01-01 RX ADMIN — AMLODIPINE BESYLATE 5 MG: 5 TABLET ORAL at 14:10

## 2017-01-01 RX ADMIN — MORPHINE SULFATE 2 MG: 2 INJECTION, SOLUTION INTRAMUSCULAR; INTRAVENOUS at 17:56

## 2017-01-01 RX ADMIN — HEPARIN SODIUM 5000 UNITS: 5000 INJECTION, SOLUTION INTRAVENOUS; SUBCUTANEOUS at 20:26

## 2017-01-01 RX ADMIN — HEPARIN SODIUM 5000 UNITS: 5000 INJECTION, SOLUTION INTRAVENOUS; SUBCUTANEOUS at 22:41

## 2017-01-01 RX ADMIN — HEPARIN SODIUM 5000 UNITS: 5000 INJECTION, SOLUTION INTRAVENOUS; SUBCUTANEOUS at 01:35

## 2017-01-01 RX ADMIN — LEVOFLOXACIN 750 MG: 5 INJECTION, SOLUTION INTRAVENOUS at 10:13

## 2017-01-01 RX ADMIN — DEXTROSE MONOHYDRATE, SODIUM CHLORIDE, AND POTASSIUM CHLORIDE 100 ML/HR: 50; 4.5; 1.49 INJECTION, SOLUTION INTRAVENOUS at 08:36

## 2017-01-01 RX ADMIN — Medication 5 ML: at 06:05

## 2017-01-01 RX ADMIN — HEPARIN SODIUM 5000 UNITS: 5000 INJECTION, SOLUTION INTRAVENOUS; SUBCUTANEOUS at 06:35

## 2017-01-01 RX ADMIN — LORAZEPAM 0.25 MG: 0.5 TABLET ORAL at 21:08

## 2017-01-01 RX ADMIN — ONDANSETRON HYDROCHLORIDE 4 MG: 2 SOLUTION INTRAMUSCULAR; INTRAVENOUS at 08:22

## 2017-01-01 RX ADMIN — LATANOPROST 1 DROP: 50 SOLUTION/ DROPS OPHTHALMIC at 22:19

## 2017-01-01 RX ADMIN — LACTOBACILLUS TAB 2 TABLET: TAB at 18:00

## 2017-01-01 RX ADMIN — SODIUM CHLORIDE, PRESERVATIVE FREE 3 ML: 5 INJECTION INTRAVENOUS at 11:32

## 2017-01-01 RX ADMIN — TIMOLOL MALEATE 1 DROP: 5 SOLUTION OPHTHALMIC at 09:01

## 2017-01-01 RX ADMIN — TIMOLOL MALEATE 1 DROP: 5 SOLUTION OPHTHALMIC at 09:00

## 2017-01-01 RX ADMIN — LORAZEPAM 0.25 MG: 0.5 TABLET ORAL at 20:43

## 2017-01-01 RX ADMIN — Medication 2 MG: at 04:58

## 2017-01-01 RX ADMIN — ONDANSETRON HYDROCHLORIDE 4 MG: 2 SOLUTION INTRAMUSCULAR; INTRAVENOUS at 15:22

## 2017-01-01 RX ADMIN — HEPARIN SODIUM 5000 UNITS: 5000 INJECTION, SOLUTION INTRAVENOUS; SUBCUTANEOUS at 02:47

## 2017-01-01 RX ADMIN — LATANOPROST 1 DROP: 50 SOLUTION OPHTHALMIC at 18:00

## 2017-01-01 RX ADMIN — HEPARIN SODIUM 5000 UNITS: 5000 INJECTION, SOLUTION INTRAVENOUS; SUBCUTANEOUS at 05:27

## 2017-01-01 RX ADMIN — VANCOMYCIN HYDROCHLORIDE 1000 MG: 1 INJECTION, POWDER, LYOPHILIZED, FOR SOLUTION INTRAVENOUS at 21:15

## 2017-01-01 RX ADMIN — ACETAMINOPHEN 650 MG: 325 TABLET, FILM COATED ORAL at 14:49

## 2017-01-01 RX ADMIN — LACTOBACILLUS TAB 2 TABLET: TAB at 17:35

## 2017-01-01 RX ADMIN — Medication 2 MG: at 22:55

## 2017-01-01 RX ADMIN — HEPARIN SODIUM 5000 UNITS: 5000 INJECTION, SOLUTION INTRAVENOUS; SUBCUTANEOUS at 14:03

## 2017-01-01 RX ADMIN — ONDANSETRON 4 MG: 2 INJECTION INTRAMUSCULAR; INTRAVENOUS at 16:26

## 2017-01-01 RX ADMIN — LORAZEPAM 0.25 MG: 0.5 TABLET ORAL at 20:06

## 2017-01-01 RX ADMIN — LACTOBACILLUS TAB 2 TABLET: TAB at 10:16

## 2017-01-01 RX ADMIN — METOPROLOL TARTRATE 50 MG: 50 TABLET ORAL at 09:23

## 2017-01-01 RX ADMIN — POLYVINYL ALCOHOL 1 DROP: 14 SOLUTION/ DROPS OPHTHALMIC at 09:09

## 2017-01-01 RX ADMIN — SODIUM CHLORIDE, PRESERVATIVE FREE 3 ML: 5 INJECTION INTRAVENOUS at 20:55

## 2017-01-01 RX ADMIN — TIMOLOL MALEATE 1 DROP: 5 SOLUTION OPHTHALMIC at 09:10

## 2017-01-01 RX ADMIN — HALOPERIDOL LACTATE 2 MG: 5 INJECTION, SOLUTION INTRAMUSCULAR at 15:11

## 2017-01-01 RX ADMIN — ASPIRIN 81 MG: 81 TABLET, COATED ORAL at 09:05

## 2017-01-01 RX ADMIN — MORPHINE SULFATE 2 MG: 2 INJECTION, SOLUTION INTRAMUSCULAR; INTRAVENOUS at 01:12

## 2017-01-01 RX ADMIN — LORAZEPAM 0.25 MG: 0.5 TABLET ORAL at 20:53

## 2017-01-01 RX ADMIN — Medication 10 ML: at 23:40

## 2017-01-01 RX ADMIN — SODIUM CHLORIDE 100 ML/HR: 900 INJECTION, SOLUTION INTRAVENOUS at 06:18

## 2017-01-01 RX ADMIN — PIPERACILLIN SODIUM,TAZOBACTAM SODIUM 4.5 G: 4; .5 INJECTION, POWDER, FOR SOLUTION INTRAVENOUS at 08:51

## 2017-01-01 RX ADMIN — LACTOBACILLUS TAB 2 TABLET: TAB at 08:21

## 2017-01-01 RX ADMIN — Medication 10 ML: at 14:00

## 2017-01-01 RX ADMIN — Medication 2 MG: at 20:08

## 2017-01-01 RX ADMIN — HALOPERIDOL LACTATE 2 MG: 5 INJECTION INTRAMUSCULAR at 17:56

## 2017-01-01 RX ADMIN — AMLODIPINE BESYLATE 5 MG: 5 TABLET ORAL at 08:53

## 2017-01-01 RX ADMIN — LORAZEPAM 0.5 MG: 0.5 TABLET ORAL at 20:54

## 2017-01-01 RX ADMIN — SODIUM CHLORIDE 500 ML: 900 INJECTION, SOLUTION INTRAVENOUS at 00:09

## 2017-01-01 RX ADMIN — METOPROLOL TARTRATE 50 MG: 50 TABLET ORAL at 17:51

## 2017-01-01 RX ADMIN — HEPARIN SODIUM 5000 UNITS: 5000 INJECTION, SOLUTION INTRAVENOUS; SUBCUTANEOUS at 14:49

## 2017-01-01 RX ADMIN — Medication 2 MG: at 03:39

## 2017-01-01 RX ADMIN — METOPROLOL TARTRATE 50 MG: 50 TABLET ORAL at 18:05

## 2017-01-01 RX ADMIN — PIPERACILLIN SODIUM,TAZOBACTAM SODIUM 4.5 G: 4; .5 INJECTION, POWDER, FOR SOLUTION INTRAVENOUS at 21:50

## 2017-01-01 RX ADMIN — TIMOLOL MALEATE 1 DROP: 5 SOLUTION OPHTHALMIC at 23:38

## 2017-01-01 RX ADMIN — Medication 2 MG: at 17:06

## 2017-01-01 RX ADMIN — ACETAMINOPHEN 650 MG: 325 TABLET, FILM COATED ORAL at 09:23

## 2017-01-01 RX ADMIN — HEPARIN SODIUM 5000 UNITS: 5000 INJECTION, SOLUTION INTRAVENOUS; SUBCUTANEOUS at 21:42

## 2017-01-01 RX ADMIN — Medication 2 MG: at 16:15

## 2017-01-01 RX ADMIN — Medication 5 ML: at 05:02

## 2017-01-01 RX ADMIN — METOPROLOL TARTRATE 50 MG: 50 TABLET ORAL at 08:17

## 2017-01-01 RX ADMIN — LACTOBACILLUS TAB 2 TABLET: TAB at 09:28

## 2017-01-01 RX ADMIN — TIMOLOL MALEATE 1 DROP: 5 SOLUTION OPHTHALMIC at 08:30

## 2017-01-01 RX ADMIN — LATANOPROST 1 DROP: 50 SOLUTION OPHTHALMIC at 21:00

## 2017-01-01 RX ADMIN — LATANOPROST 1 DROP: 50 SOLUTION OPHTHALMIC at 21:52

## 2017-01-01 RX ADMIN — ASPIRIN 81 MG: 81 TABLET, COATED ORAL at 08:17

## 2017-01-01 RX ADMIN — Medication 2 MG: at 15:17

## 2017-01-01 RX ADMIN — TIMOLOL MALEATE 1 DROP: 5 SOLUTION OPHTHALMIC at 08:32

## 2017-01-01 RX ADMIN — TIMOLOL MALEATE 1 DROP: 5 SOLUTION OPHTHALMIC at 09:02

## 2017-01-01 RX ADMIN — METOPROLOL TARTRATE 50 MG: 50 TABLET ORAL at 08:29

## 2017-01-01 RX ADMIN — HEPARIN SODIUM 5000 UNITS: 5000 INJECTION, SOLUTION INTRAVENOUS; SUBCUTANEOUS at 08:17

## 2017-01-01 RX ADMIN — DEXTROSE MONOHYDRATE, SODIUM CHLORIDE, AND POTASSIUM CHLORIDE 100 ML/HR: 50; 4.5; 1.49 INJECTION, SOLUTION INTRAVENOUS at 11:52

## 2017-01-01 RX ADMIN — LATANOPROST 1 DROP: 50 SOLUTION OPHTHALMIC at 22:50

## 2017-01-01 RX ADMIN — LORAZEPAM 0.25 MG: 0.5 TABLET ORAL at 20:33

## 2017-01-01 RX ADMIN — ASPIRIN 81 MG: 81 TABLET, COATED ORAL at 09:28

## 2017-01-01 RX ADMIN — ACETAMINOPHEN 650 MG: 325 TABLET, FILM COATED ORAL at 11:42

## 2017-01-01 RX ADMIN — HEPARIN SODIUM 5000 UNITS: 5000 INJECTION, SOLUTION INTRAVENOUS; SUBCUTANEOUS at 04:52

## 2017-01-01 RX ADMIN — BISACODYL 10 MG: 10 SUPPOSITORY RECTAL at 19:31

## 2017-01-01 RX ADMIN — METOPROLOL TARTRATE 50 MG: 50 TABLET ORAL at 18:15

## 2017-01-01 RX ADMIN — AMLODIPINE BESYLATE 10 MG: 10 TABLET ORAL at 08:36

## 2017-01-01 RX ADMIN — METOPROLOL TARTRATE 50 MG: 50 TABLET ORAL at 08:36

## 2017-01-01 RX ADMIN — VANCOMYCIN HYDROCHLORIDE 1000 MG: 1 INJECTION, POWDER, LYOPHILIZED, FOR SOLUTION INTRAVENOUS at 09:30

## 2017-01-01 RX ADMIN — LACTOBACILLUS TAB 2 TABLET: TAB at 18:25

## 2017-01-01 RX ADMIN — POLYETHYLENE GLYCOL 3350 17 G: 17 POWDER, FOR SOLUTION ORAL at 12:52

## 2017-01-01 RX ADMIN — HEPARIN SODIUM 5000 UNITS: 5000 INJECTION, SOLUTION INTRAVENOUS; SUBCUTANEOUS at 15:37

## 2017-01-01 RX ADMIN — Medication 2 MG: at 00:09

## 2017-01-01 RX ADMIN — ASPIRIN 81 MG: 81 TABLET, COATED ORAL at 09:10

## 2017-01-01 RX ADMIN — OXYCODONE HYDROCHLORIDE 2.5 MG: 5 TABLET ORAL at 17:08

## 2017-01-01 RX ADMIN — HEPARIN SODIUM 5000 UNITS: 5000 INJECTION, SOLUTION INTRAVENOUS; SUBCUTANEOUS at 11:56

## 2017-01-01 RX ADMIN — DIATRIZOATE MEGLUMINE AND DIATRIZOATE SODIUM 15 ML: 660; 100 LIQUID ORAL; RECTAL at 00:09

## 2017-01-01 RX ADMIN — HEPARIN SODIUM 5000 UNITS: 5000 INJECTION, SOLUTION INTRAVENOUS; SUBCUTANEOUS at 23:30

## 2017-01-01 RX ADMIN — MORPHINE SULFATE 2 MG: 2 INJECTION, SOLUTION INTRAMUSCULAR; INTRAVENOUS at 07:37

## 2017-01-01 RX ADMIN — HEPARIN SODIUM 5000 UNITS: 5000 INJECTION, SOLUTION INTRAVENOUS; SUBCUTANEOUS at 22:30

## 2017-01-01 RX ADMIN — POTASSIUM CHLORIDE 40 MEQ: 20 TABLET, EXTENDED RELEASE ORAL at 14:10

## 2017-01-01 RX ADMIN — METOPROLOL TARTRATE 50 MG: 50 TABLET ORAL at 09:29

## 2017-01-01 RX ADMIN — Medication 10 ML: at 23:26

## 2017-01-01 RX ADMIN — PIPERACILLIN SODIUM,TAZOBACTAM SODIUM 4.5 G: 4; .5 INJECTION, POWDER, FOR SOLUTION INTRAVENOUS at 08:00

## 2017-01-01 RX ADMIN — METOPROLOL TARTRATE 50 MG: 50 TABLET ORAL at 09:11

## 2017-01-01 RX ADMIN — ASPIRIN 81 MG: 81 TABLET, COATED ORAL at 09:11

## 2017-01-01 RX ADMIN — SODIUM CHLORIDE 100 ML/HR: 900 INJECTION, SOLUTION INTRAVENOUS at 06:24

## 2017-01-01 RX ADMIN — METOPROLOL TARTRATE 50 MG: 50 TABLET ORAL at 17:24

## 2017-01-01 RX ADMIN — POTASSIUM CHLORIDE 40 MEQ: 20 SOLUTION ORAL at 10:13

## 2017-01-01 RX ADMIN — SODIUM CHLORIDE, PRESERVATIVE FREE 3 ML: 5 INJECTION INTRAVENOUS at 20:02

## 2017-01-01 RX ADMIN — Medication 10 ML: at 02:28

## 2017-01-01 RX ADMIN — HEPARIN SODIUM 5000 UNITS: 5000 INJECTION, SOLUTION INTRAVENOUS; SUBCUTANEOUS at 22:20

## 2017-01-01 RX ADMIN — LATANOPROST 1 DROP: 50 SOLUTION OPHTHALMIC at 23:24

## 2017-01-01 RX ADMIN — ASPIRIN 81 MG: 81 TABLET, COATED ORAL at 08:21

## 2017-01-01 RX ADMIN — HEPARIN SODIUM 5000 UNITS: 5000 INJECTION, SOLUTION INTRAVENOUS; SUBCUTANEOUS at 06:21

## 2017-01-01 RX ADMIN — METOPROLOL TARTRATE 50 MG: 50 TABLET ORAL at 08:53

## 2017-01-01 RX ADMIN — HEPARIN SODIUM 5000 UNITS: 5000 INJECTION, SOLUTION INTRAVENOUS; SUBCUTANEOUS at 23:38

## 2017-01-01 RX ADMIN — SODIUM CHLORIDE, PRESERVATIVE FREE 3 ML: 5 INJECTION INTRAVENOUS at 09:03

## 2017-01-01 RX ADMIN — ASPIRIN 81 MG: 81 TABLET, COATED ORAL at 09:23

## 2017-01-01 RX ADMIN — LACTOBACILLUS TAB 2 TABLET: TAB at 17:28

## 2017-01-01 RX ADMIN — MORPHINE SULFATE 2 MG: 2 INJECTION, SOLUTION INTRAMUSCULAR; INTRAVENOUS at 19:15

## 2017-01-01 RX ADMIN — METOPROLOL TARTRATE 50 MG: 50 TABLET ORAL at 17:29

## 2017-01-01 RX ADMIN — METOPROLOL TARTRATE 50 MG: 50 TABLET ORAL at 17:15

## 2017-01-01 RX ADMIN — Medication 10 ML: at 06:13

## 2017-01-01 RX ADMIN — LATANOPROST 1 DROP: 50 SOLUTION OPHTHALMIC at 20:16

## 2017-01-01 RX ADMIN — Medication 118 ML: at 11:30

## 2017-01-01 RX ADMIN — METOPROLOL TARTRATE 50 MG: 50 TABLET ORAL at 18:07

## 2017-01-01 RX ADMIN — LATANOPROST 1 DROP: 50 SOLUTION/ DROPS OPHTHALMIC at 21:30

## 2017-01-01 RX ADMIN — DIPHENHYDRAMINE HYDROCHLORIDE 12.5 MG: 50 INJECTION, SOLUTION INTRAMUSCULAR; INTRAVENOUS at 14:49

## 2017-01-01 RX ADMIN — SODIUM CHLORIDE 100 ML/HR: 900 INJECTION, SOLUTION INTRAVENOUS at 11:42

## 2017-01-01 RX ADMIN — SODIUM CHLORIDE 75 ML/HR: 900 INJECTION, SOLUTION INTRAVENOUS at 11:17

## 2017-01-01 RX ADMIN — POTASSIUM CHLORIDE 40 MEQ: 20 SOLUTION ORAL at 18:38

## 2017-01-01 RX ADMIN — MORPHINE SULFATE 2 MG: 2 INJECTION, SOLUTION INTRAMUSCULAR; INTRAVENOUS at 13:25

## 2017-01-01 RX ADMIN — SODIUM CHLORIDE 75 ML/HR: 900 INJECTION, SOLUTION INTRAVENOUS at 05:00

## 2017-01-01 RX ADMIN — ASPIRIN 81 MG: 81 TABLET, COATED ORAL at 10:16

## 2017-01-01 RX ADMIN — PIPERACILLIN SODIUM,TAZOBACTAM SODIUM 4.5 G: 4; .5 INJECTION, POWDER, FOR SOLUTION INTRAVENOUS at 10:14

## 2017-01-01 RX ADMIN — LATANOPROST 1 DROP: 50 SOLUTION OPHTHALMIC at 17:45

## 2017-01-01 RX ADMIN — TIMOLOL MALEATE 1 DROP: 5 SOLUTION OPHTHALMIC at 09:24

## 2017-01-01 RX ADMIN — TIMOLOL MALEATE 1 DROP: 5 SOLUTION OPHTHALMIC at 08:31

## 2017-01-01 RX ADMIN — HEPARIN SODIUM 5000 UNITS: 5000 INJECTION, SOLUTION INTRAVENOUS; SUBCUTANEOUS at 18:17

## 2017-01-01 RX ADMIN — METOPROLOL TARTRATE 50 MG: 50 TABLET ORAL at 18:00

## 2017-01-01 RX ADMIN — ACETAMINOPHEN 650 MG: 650 SUPPOSITORY RECTAL at 03:47

## 2017-01-01 RX ADMIN — METOPROLOL TARTRATE 50 MG: 50 TABLET ORAL at 09:10

## 2017-01-01 RX ADMIN — SODIUM CHLORIDE 100 ML/HR: 450 INJECTION, SOLUTION INTRAVENOUS at 09:42

## 2017-01-01 RX ADMIN — LORAZEPAM 0.5 MG: 2 INJECTION INTRAMUSCULAR; INTRAVENOUS at 23:15

## 2017-01-01 RX ADMIN — ONDANSETRON HYDROCHLORIDE 4 MG: 2 SOLUTION INTRAMUSCULAR; INTRAVENOUS at 13:43

## 2017-01-01 RX ADMIN — LORAZEPAM 1 MG: 2 INJECTION, SOLUTION INTRAMUSCULAR; INTRAVENOUS at 13:48

## 2017-01-01 RX ADMIN — HEPARIN SODIUM 5000 UNITS: 5000 INJECTION, SOLUTION INTRAVENOUS; SUBCUTANEOUS at 17:36

## 2017-01-01 RX ADMIN — MORPHINE SULFATE 2 MG: 2 INJECTION, SOLUTION INTRAMUSCULAR; INTRAVENOUS at 12:26

## 2017-01-01 RX ADMIN — LORAZEPAM 0.5 MG: 2 INJECTION INTRAMUSCULAR; INTRAVENOUS at 19:32

## 2017-01-01 RX ADMIN — HALOPERIDOL LACTATE 2 MG: 5 INJECTION, SOLUTION INTRAMUSCULAR at 12:26

## 2017-01-01 RX ADMIN — TIMOLOL MALEATE 1 DROP: 5 SOLUTION OPHTHALMIC at 12:46

## 2017-01-01 RX ADMIN — SODIUM CHLORIDE 100 ML/HR: 900 INJECTION, SOLUTION INTRAVENOUS at 21:52

## 2017-01-01 RX ADMIN — HEPARIN SODIUM 5000 UNITS: 5000 INJECTION, SOLUTION INTRAVENOUS; SUBCUTANEOUS at 06:44

## 2017-01-01 RX ADMIN — LEVOFLOXACIN 750 MG: 5 INJECTION, SOLUTION INTRAVENOUS at 11:57

## 2017-01-01 RX ADMIN — HEPARIN SODIUM 5000 UNITS: 5000 INJECTION, SOLUTION INTRAVENOUS; SUBCUTANEOUS at 06:26

## 2017-01-01 RX ADMIN — HEPARIN SODIUM 5000 UNITS: 5000 INJECTION, SOLUTION INTRAVENOUS; SUBCUTANEOUS at 06:07

## 2017-01-01 RX ADMIN — SODIUM CHLORIDE 100 ML/HR: 450 INJECTION, SOLUTION INTRAVENOUS at 01:16

## 2017-01-01 RX ADMIN — HEPARIN SODIUM 5000 UNITS: 5000 INJECTION, SOLUTION INTRAVENOUS; SUBCUTANEOUS at 13:58

## 2017-01-01 RX ADMIN — PIPERACILLIN SODIUM,TAZOBACTAM SODIUM 4.5 G: 4; .5 INJECTION, POWDER, FOR SOLUTION INTRAVENOUS at 20:08

## 2017-01-01 RX ADMIN — POLYVINYL ALCOHOL 1 DROP: 14 SOLUTION/ DROPS OPHTHALMIC at 11:33

## 2017-01-01 RX ADMIN — MORPHINE SULFATE 2 MG: 2 INJECTION, SOLUTION INTRAMUSCULAR; INTRAVENOUS at 22:18

## 2017-01-01 RX ADMIN — HEPARIN SODIUM 5000 UNITS: 5000 INJECTION, SOLUTION INTRAVENOUS; SUBCUTANEOUS at 11:17

## 2017-01-01 RX ADMIN — SODIUM CHLORIDE 100 ML/HR: 450 INJECTION, SOLUTION INTRAVENOUS at 03:37

## 2017-01-01 RX ADMIN — SODIUM CHLORIDE, PRESERVATIVE FREE 3 ML: 5 INJECTION INTRAVENOUS at 21:00

## 2017-01-01 RX ADMIN — LORAZEPAM 0.5 MG: 0.5 TABLET ORAL at 20:47

## 2017-01-01 RX ADMIN — HEPARIN SODIUM 5000 UNITS: 5000 INJECTION, SOLUTION INTRAVENOUS; SUBCUTANEOUS at 18:29

## 2017-01-01 RX ADMIN — METOPROLOL TARTRATE 50 MG: 50 TABLET ORAL at 18:17

## 2017-01-01 RX ADMIN — VANCOMYCIN HYDROCHLORIDE 1000 MG: 1 INJECTION, POWDER, LYOPHILIZED, FOR SOLUTION INTRAVENOUS at 11:43

## 2017-01-01 RX ADMIN — LACTOBACILLUS TAB 2 TABLET: TAB at 09:10

## 2017-01-01 RX ADMIN — HEPARIN SODIUM 5000 UNITS: 5000 INJECTION, SOLUTION INTRAVENOUS; SUBCUTANEOUS at 21:52

## 2017-01-01 RX ADMIN — AMLODIPINE BESYLATE 10 MG: 10 TABLET ORAL at 09:29

## 2017-01-01 RX ADMIN — SODIUM CHLORIDE 100 ML/HR: 900 INJECTION, SOLUTION INTRAVENOUS at 02:28

## 2017-01-01 RX ADMIN — LACTOBACILLUS TAB 2 TABLET: TAB at 10:21

## 2017-01-01 RX ADMIN — DEXTROSE MONOHYDRATE, SODIUM CHLORIDE, AND POTASSIUM CHLORIDE 100 ML/HR: 50; 4.5; 1.49 INJECTION, SOLUTION INTRAVENOUS at 04:53

## 2017-01-01 RX ADMIN — LATANOPROST 1 DROP: 50 SOLUTION/ DROPS OPHTHALMIC at 21:00

## 2017-01-01 RX ADMIN — PROMETHAZINE HYDROCHLORIDE 6.25 MG: 25 INJECTION INTRAMUSCULAR; INTRAVENOUS at 17:26

## 2017-01-01 RX ADMIN — SODIUM CHLORIDE 100 ML/HR: 450 INJECTION, SOLUTION INTRAVENOUS at 08:18

## 2017-01-01 RX ADMIN — PIPERACILLIN SODIUM,TAZOBACTAM SODIUM 4.5 G: 4; .5 INJECTION, POWDER, FOR SOLUTION INTRAVENOUS at 08:23

## 2017-01-01 RX ADMIN — LATANOPROST 1 DROP: 50 SOLUTION/ DROPS OPHTHALMIC at 22:08

## 2017-01-01 RX ADMIN — SODIUM CHLORIDE, PRESERVATIVE FREE 3 ML: 5 INJECTION INTRAVENOUS at 21:29

## 2017-01-01 RX ADMIN — ASPIRIN 81 MG: 81 TABLET, COATED ORAL at 08:53

## 2017-01-01 RX ADMIN — LACTOBACILLUS TAB 2 TABLET: TAB at 18:07

## 2017-01-01 RX ADMIN — MORPHINE SULFATE 2 MG: 2 INJECTION, SOLUTION INTRAMUSCULAR; INTRAVENOUS at 18:12

## 2017-01-01 RX ADMIN — ONDANSETRON 4 MG: 2 INJECTION INTRAMUSCULAR; INTRAVENOUS at 21:13

## 2017-01-01 RX ADMIN — LACTOBACILLUS TAB 1 TABLET: TAB at 09:03

## 2017-01-01 RX ADMIN — DIATRIZOATE MEGLUMINE AND DIATRIZOATE SODIUM 15 ML: 600; 100 SOLUTION ORAL; RECTAL at 13:55

## 2017-01-01 RX ADMIN — LORAZEPAM 0.25 MG: 0.5 TABLET ORAL at 20:34

## 2017-01-01 RX ADMIN — HYDRALAZINE HYDROCHLORIDE 10 MG: 20 INJECTION INTRAMUSCULAR; INTRAVENOUS at 08:32

## 2017-01-01 RX ADMIN — SODIUM CHLORIDE 100 ML/HR: 450 INJECTION, SOLUTION INTRAVENOUS at 01:59

## 2017-01-01 RX ADMIN — HEPARIN SODIUM 5000 UNITS: 5000 INJECTION, SOLUTION INTRAVENOUS; SUBCUTANEOUS at 06:14

## 2017-01-01 RX ADMIN — LACTOBACILLUS TAB 2 TABLET: TAB at 17:15

## 2017-01-01 RX ADMIN — SODIUM CHLORIDE, PRESERVATIVE FREE 3 ML: 5 INJECTION INTRAVENOUS at 21:24

## 2017-01-01 RX ADMIN — HEPARIN SODIUM 5000 UNITS: 5000 INJECTION, SOLUTION INTRAVENOUS; SUBCUTANEOUS at 22:36

## 2017-01-01 RX ADMIN — HEPARIN SODIUM 5000 UNITS: 5000 INJECTION, SOLUTION INTRAVENOUS; SUBCUTANEOUS at 21:32

## 2017-01-01 RX ADMIN — BISACODYL 10 MG: 10 SUPPOSITORY RECTAL at 19:32

## 2017-01-01 RX ADMIN — ACETAMINOPHEN 650 MG: 650 SUPPOSITORY RECTAL at 12:50

## 2017-01-01 RX ADMIN — METOPROLOL TARTRATE 50 MG: 50 TABLET ORAL at 09:05

## 2017-01-01 RX ADMIN — LATANOPROST 1 DROP: 50 SOLUTION/ DROPS OPHTHALMIC at 21:07

## 2017-01-01 RX ADMIN — ONDANSETRON 4 MG: 2 INJECTION INTRAMUSCULAR; INTRAVENOUS at 00:09

## 2017-01-01 RX ADMIN — LATANOPROST 1 DROP: 50 SOLUTION OPHTHALMIC at 21:13

## 2017-01-01 RX ADMIN — Medication 10 ML: at 05:33

## 2017-01-01 RX ADMIN — Medication 2 MG: at 01:14

## 2017-01-01 RX ADMIN — PIPERACILLIN SODIUM,TAZOBACTAM SODIUM 4.5 G: 4; .5 INJECTION, POWDER, FOR SOLUTION INTRAVENOUS at 23:05

## 2017-01-01 RX ADMIN — DIATRIZOATE MEGLUMINE AND DIATRIZOATE SODIUM 15 ML: 660; 100 LIQUID ORAL; RECTAL at 21:13

## 2017-01-01 RX ADMIN — Medication 5 ML: at 11:51

## 2017-01-01 RX ADMIN — HEPARIN SODIUM 5000 UNITS: 5000 INJECTION, SOLUTION INTRAVENOUS; SUBCUTANEOUS at 13:54

## 2017-01-01 RX ADMIN — MORPHINE SULFATE 2 MG: 2 INJECTION, SOLUTION INTRAMUSCULAR; INTRAVENOUS at 15:12

## 2017-01-01 RX ADMIN — AMLODIPINE BESYLATE 5 MG: 5 TABLET ORAL at 10:22

## 2017-01-01 RX ADMIN — Medication 2 MG: at 18:18

## 2017-01-01 RX ADMIN — POLYVINYL ALCOHOL 1 DROP: 14 SOLUTION/ DROPS OPHTHALMIC at 18:38

## 2017-01-01 RX ADMIN — AMLODIPINE BESYLATE 10 MG: 10 TABLET ORAL at 08:21

## 2017-01-01 RX ADMIN — LACTOBACILLUS TAB 2 TABLET: TAB at 18:38

## 2017-01-01 RX ADMIN — ASPIRIN 81 MG: 81 TABLET, COATED ORAL at 10:22

## 2017-01-01 RX ADMIN — SODIUM CHLORIDE 100 ML/HR: 450 INJECTION, SOLUTION INTRAVENOUS at 03:54

## 2017-01-01 RX ADMIN — METOPROLOL TARTRATE 50 MG: 50 TABLET ORAL at 10:16

## 2017-01-01 RX ADMIN — LATANOPROST 1 DROP: 50 SOLUTION/ DROPS OPHTHALMIC at 21:08

## 2017-01-01 RX ADMIN — TIMOLOL MALEATE 1 DROP: 5 SOLUTION OPHTHALMIC at 08:22

## 2017-01-01 RX ADMIN — LACTOBACILLUS TAB 2 TABLET: TAB at 08:36

## 2017-01-01 RX ADMIN — Medication 10 ML: at 21:40

## 2017-01-01 RX ADMIN — LATANOPROST 1 DROP: 50 SOLUTION/ DROPS OPHTHALMIC at 22:43

## 2017-01-01 RX ADMIN — LIDOCAINE HYDROCHLORIDE: 20 JELLY TOPICAL at 03:42

## 2017-01-01 RX ADMIN — HEPARIN SODIUM 5000 UNITS: 5000 INJECTION, SOLUTION INTRAVENOUS; SUBCUTANEOUS at 09:27

## 2017-01-01 RX ADMIN — ASPIRIN 81 MG: 81 TABLET, COATED ORAL at 08:36

## 2017-01-24 NOTE — IP AVS SNAPSHOT
[address] [place] [address] 
[phone] Patient: Anup De Jesus MRN: [id number] :3/13/1924 You are allergic to the following No active allergies Immunizations Administered for This Admission Name Date  
 TB Skin Test (PPD) Intradermal 2017 Recent Documentation Height Weight Breastfeeding? BMI OB Status Smoking Status 1.626 m 45.4 kg No 17.16 kg/m2 Postmenopausal Never Smoker Emergency Contacts Name Discharge Info Relation Home Work Mobile Patricia Guadarrama  Child [2] [phone] About your hospitalization You were admitted on:  2017 You last received care in the:  [facility] You were discharged on:  2017 Unit phone number:  [phone] Why you were hospitalized Your primary diagnosis was:  Kong (Acute Kidney Injury) (Hcc) Your diagnoses also included:  Chronic Kidney Disease, Stage Iii (Moderate), Hyperkalemia, Diarrhea, Nausea Providers Seen During Your Hospitalizations Provider Role Specialty Primary office phone Belgica Brower MD Attending Provider Emergency Medicine [phone] Rashi Mcdowell MD Attending Provider Emergency Medicine [phone] Cintia Valdez DO Attending Provider Internal Medicine [phone] Anitra Ortega MD Attending Provider Internal Medicine [phone] Your Primary Care Physician (PCP) Primary Care Physician Office Phone Office Fax [phone] [facility], [place] [address] [phone] Follow-up Information Follow up With Details Comments Contact Info Maico Walker MD   [address] 
[phone] Current Discharge Medication List  
  
START taking these medications Dose & Instructions Dispensing Information Comments Morning Noon Evening Bedtime  
 amLODIPine 10 mg tablet Commonly known as:  Nina Darting Your next dose is: Today, Tomorrow Other:  _________ Dose:  10 mg Take 1 Tab by mouth daily. Quantity:  30 Tab Refills:  0  
     
   
   
   
  
 promethazine 25 mg suppository Commonly known as:  PHENERGAN Your next dose is: Today, Tomorrow Other:  _________ Dose:  25 mg Insert 1 Suppository into rectum every four (4) hours as needed for Nausea. Quantity:  15 Suppository Refills:  0 CONTINUE these medications which have NOT CHANGED Dose & Instructions Dispensing Information Comments Morning Noon Evening Bedtime  
 aspirin 81 mg tablet Your next dose is: Today, Tomorrow Other:  _________ Dose:  81 mg Take 81 mg by mouth. Refills:  0  
     
   
   
   
  
 erythromycin ophthalmic ointment Commonly known as:  ILOTYCIN Your next dose is: Today, Tomorrow Other:  _________ Administer  to right eye nightly. Refills:  0  
     
   
   
   
  
 FEOSOL 45 mg Tab Generic drug:  iron, carbonyl Your next dose is: Today, Tomorrow Other:  _________ Dose:  1 Tab Take 1 Tab by mouth daily. Refills:  0  
     
   
   
   
  
 metoprolol tartrate 50 mg tablet Commonly known as:  LOPRESSOR Your next dose is: Today, Tomorrow Other:  _________ Dose:  50 mg Take 50 mg by mouth two (2) times a day. Refills:  0 NEVANAC 0.1 % ophthalmic suspension Generic drug:  nepafenac Your next dose is: Today, Tomorrow Other:  _________ Dose:  1 Drop Administer 1 Drop to both eyes two (2) times daily as needed for Pain. Refills:  0  
     
   
   
   
  
 timolol 0.5 % ophthalmic solution Commonly known as:  Clau Lawrence Your next dose is: Today, Tomorrow Other:  _________ Dose:  1 Drop Administer 1 Drop to both eyes daily. use in affected eye(s) Refills:  0  
     
   
   
   
  
 traMADol 50 mg tablet Commonly known as:  ULTRAM  
   
Your next dose is: Today, Tomorrow Other:  _________ Dose:  50 mg Take 1 Tab by mouth every six (6) hours as needed for Pain. Quantity:  30 Tab Refills:  0  
     
   
   
   
  
 XALATAN 0.005 % ophthalmic solution Generic drug:  latanoprost  
   
Your next dose is: Today, Tomorrow Other:  _________ Dose:  1 Drop Administer 1 Drop to both eyes nightly. Refills:  0 Where to Get Your Medications Information on where to get these meds will be given to you by the nurse or doctor. ! Ask your nurse or doctor about these medications  
  amLODIPine 10 mg tablet  
 promethazine 25 mg suppository  
 traMADol 50 mg tablet Discharge Instructions DISCHARGE SUMMARY from Nurse The following personal items are in your possession at time of discharge: 
 
Dental Appliances: At home Visual Aid: None Home Medications: None Jewelry: None Clothing: Sweater, Pants, Socks, Undergarments, Footwear, With patient Other Valuables: None PATIENT INSTRUCTIONS: 
 
 
F-face looks uneven A-arms unable to move or move unevenly S-speech slurred or non-existent T-time-call 911 as soon as signs and symptoms begin-DO NOT go Back to bed or wait to see if you get better-TIME IS BRAIN. Warning Signs of HEART ATTACK Call 911 if you have these symptoms: 
? Chest discomfort. Most heart attacks involve discomfort in the center of the chest that lasts more than a few minutes, or that goes away and comes back. It can feel like uncomfortable pressure, squeezing, fullness, or pain. ? Discomfort in other areas of the upper body. Symptoms can include pain or discomfort in one or both arms, the back, neck, jaw, or stomach. ? Shortness of breath with or without chest discomfort. ? Other signs may include breaking out in a cold sweat, nausea, or lightheadedness. Don't wait more than five minutes to call [address]! Fast action can save your life. Calling 911 is almost always the fastest way to get lifesaving treatment. Emergency Medical Services staff can begin treatment when they arrive  up to an hour sooner than if someone gets to the hospital by car. The discharge information has been reviewed with the {PATIENT PARENT GUARDIAN:85059}. The {PATIENT PARENT GUARDIAN:54751} verbalized understanding. Discharge medications reviewed with the {Dishcarge meds reviewed [facility]} and appropriate educational materials and side effects teaching were provided. Learning About Hospice and Palliative Care What are hospice and palliative care? Palliative (say \"PAL-urszula-uh-tiv\") care is an area of medicine that helps give you more good days by providing care for quality-of-life issues. It includes treating symptoms like pain, nausea, or sleep problems. It can also include helping you and your loved ones to: · Understand your illness better. · Talk more openly about your feelings. · Decide what treatments you want or don't want. · Communicate better with your doctors, nurses, and each other. Hospice care is a type of palliative care. But it's for people who are near the end of life. What kinds of care are involved? Palliative care: This treatment helps you feel better physically, emotionally, and spiritually while doctors also treat your illness. Your care may include pain relief, counseling, or nutrition advice. Hospice care: Again, the goal of this type of care is to help you feel better. And it can help you get the most out of the time you have left. But you no longer get treatment to try to cure your illness. When does care happen? Palliative care: This care can happen at any time during a serious illness. You don't have to be near death to get this care. Hospice care: In most cases, you can choose hospice care when your doctor believes that you have no more than about 6 months to live. Where does the care happen? Palliative care: This care often happens in hospitals or long-term care facilities like nursing homes. It can take place wherever you are treated, even in your home. Hospice care: Most hospice care is done in the place the patient calls \"home. \" This is often the person's home. But it could also be a place like a nursing home or [facility] center. Hospice care may also be given in hospice centers, hospitals, and other places. Who provides the care? Palliative care: There are doctors and nurses who specialize in this field. But your own doctor may also give some of this care. And there are many other experts who may help you. These include social workers, counselors, therapists, and nutrition experts. Hospice care: In hospitals, hospice centers, and other facilities, care is given by doctors, nurses, and others who are trained in hospice care. In the home, a family member is often the main caregiver. But the family member gets help from care experts. They are on call 24 hours a day. Where can you learn more? Go to http://jacob-saranya.info/. Enter 466 [id number] in the search box to learn more about \"Learning About Hospice and Palliative Care. \" Current as of: February 24, 2016 Content Version: 11.1 © 3496-8509 [employer]wise, Incorporated. Care instructions adapted under license by [employer] (which disclaims liability or warranty for this information).  If you have questions about a medical condition or this instruction, always ask your healthcare professional. [employer] disclaims any warranty or liability for your use of this information. Discharge Orders None [email or web address] Announcement We are excited to announce that we are making your provider's discharge notes available to you in [email or web address]. You will see these notes when they are completed and signed by the physician that discharged you from your recent hospital stay. If you have any questions or concerns about any information you see in [email or web address], please call the Health Information Department where you were seen or reach out to your Primary Care Provider for more information about your plan of care. Introducing [facility]! Jonathan Rivas introduces [email or web address] patient portal. Now you can access parts of your medical record, email your doctor's office, and request medication refills online. 1. In your internet browser, go to [email or web address] 2. Click on the First Time User? Click Here link in the Sign In box. You will see the New Member Sign Up page. 3. Enter your [email or web address] Access Code exactly as it appears below. You will not need to use this code after youve completed the sign-up process. If you do not sign up before the expiration date, you must request a new code. · [email or web address] Access Code: [email or web address] Expires: 4/24/2017  7:21 PM 
 
4. Enter the last four digits of your Social Security Number (xxxx) and Date of Birth (mm/dd/yyyy) as indicated and click Submit. You will be taken to the next sign-up page. 5. Create a [email or web address] ID. This will be your [email or web address] login ID and cannot be changed, so think of one that is secure and easy to remember. 6. Create a [email or web address] password. You can change your password at any time. 7. Enter your Password Reset Question and Answer. This can be used at a later time if you forget your password. 8. Enter your e-mail address. You will receive e-mail notification when new information is available in [address] 9. Click Sign Up. You can now view and download portions of your medical record. 10. Click the Download Summary menu link to download a portable copy of your medical information. If you have questions, please visit the Frequently Asked Questions section of the [email or web address] website. Remember, [email or web address] is NOT to be used for urgent needs. For medical emergencies, dial 911. Now available from your iPhone and Android! General Information Please provide this summary of care documentation to your next provider. Patient Signature:  ____________________________________________________________ Date:  ____________________________________________________________  
  
Torin Sport Provider Signature:  ____________________________________________________________ Date:  ____________________________________________________________

## 2017-01-25 PROBLEM — N17.9 AKI (ACUTE KIDNEY INJURY) (HCC): Status: ACTIVE | Noted: 2017-01-01

## 2017-01-25 PROBLEM — R19.7 DIARRHEA: Status: ACTIVE | Noted: 2017-01-01

## 2017-01-25 PROBLEM — R11.0 NAUSEA: Status: ACTIVE | Noted: 2017-01-01

## 2017-01-25 NOTE — PROGRESS NOTES
Pt admitted after MN, no charge    Ms. Fito Altamirano is a 79 yo female who presented with c/o abd pain, n/v/d. Has hx of SBO. Reports daughter  last week and has had poor po intake since. Today she is quiet, withdrawn. Reports abd pain improved, vomited X1 today. Lungs CTA bilaterally, S1S2 present without murmurs rubs gallops. RRR. abd distended, BS present, generalized tenderness to abd on palpation. CT abd without contrast showing no obstruction.       Plan:  Increase IVF, BMP in AM      Case discussed with pt, care team, Dr. Ivy Daly, daughter at bedside  Notes, labs, VS, diagnostic testing reviewed    Rachel Jolley NP

## 2017-01-25 NOTE — ED PROVIDER NOTES
HPI Comments: She had with prior bowel obstruction. He'll has her appendix. Presents with suprapubic and right lower quadrant abdominal pain for the past 2 days. The diarrhea yesterday with 3 episodes today. Had some nausea and vomiting yesterday. Under a lot of stress just buried her daughter this weekend. Patient is a 80 y.o. female presenting with abdominal pain. The history is provided by the patient and a relative. No  was used. Abdominal Pain    This is a new problem. The current episode started 2 days ago. The problem occurs constantly. The problem has been gradually worsening. The pain is associated with an unknown factor. The pain is located in the RLQ and suprapubic region. The quality of the pain is aching and sharp. The pain is moderate. Associated symptoms include diarrhea, nausea and vomiting. Pertinent negatives include no fever, no melena, no constipation, no dysuria, no hematuria, no headaches, no chest pain and no back pain. Nothing worsens the pain. The pain is relieved by nothing. Past Medical History:   Diagnosis Date    Blindness - both eyes 1997    CKD (chronic kidney disease) stage 3, GFR 30-59 ml/min      baseline creatinine 1.8    Hypertension     Other ill-defined conditions(799.89)      shingles       Past Surgical History:   Procedure Laterality Date    Hx heent       Glaucoma    Hx gi       bowel obstruction/resection         Family History:   Problem Relation Age of Onset    Hypertension Other        Social History     Social History    Marital status:      Spouse name: N/A    Number of children: N/A    Years of education: N/A     Occupational History    Not on file.      Social History Main Topics    Smoking status: Never Smoker    Smokeless tobacco: Never Used    Alcohol use No    Drug use: No    Sexual activity: Not on file     Other Topics Concern    Not on file     Social History Narrative    Pt lives with her daughter Pt desires a natural death and would not want a ventilator    DNR. Amanda Velasquez,                  ALLERGIES: Review of patient's allergies indicates no known allergies. Review of Systems   Constitutional: Negative for chills and fever. HENT: Negative for rhinorrhea and sore throat. Eyes: Negative for pain and redness. Respiratory: Negative for chest tightness, shortness of breath and wheezing. Cardiovascular: Negative for chest pain and leg swelling. Gastrointestinal: Positive for abdominal pain, diarrhea, nausea and vomiting. Negative for constipation and melena. Genitourinary: Negative for dysuria and hematuria. Musculoskeletal: Negative for back pain, gait problem, neck pain and neck stiffness. Skin: Negative for color change and rash. Neurological: Negative for weakness, numbness and headaches. Vitals:    01/24/17 1930 01/1934   BP: 97/45 110/46   Pulse: 62    Resp: 17    Temp: 97.4 °F (36.3 °C)    SpO2: 95%    Weight: 45.4 kg (100 lb)    Height: 5' 4\" (1.626 m)             Physical Exam   Constitutional: She is oriented to person, place, and time. She appears well-developed and well-nourished. HENT:   Head: Normocephalic and atraumatic. Neck: Normal range of motion. Neck supple. Cardiovascular: Normal rate and regular rhythm. No murmur heard. Pulmonary/Chest: Effort normal and breath sounds normal. She has no wheezes. Abdominal: She exhibits distension. There is tenderness. There is no rebound. BS decreased     Musculoskeletal: Normal range of motion. She exhibits no edema. Neurological: She is alert and oriented to person, place, and time. Skin: Skin is warm and dry. Nursing note and vitals reviewed. MDM  Number of Diagnoses or Management Options  Diagnosis management comments: Dr. Noah Segovia will follow up on CT results.         Amount and/or Complexity of Data Reviewed  Clinical lab tests: ordered and reviewed  Tests in the radiology section of CPT®: ordered and reviewed  Tests in the medicine section of CPT®: ordered and reviewed    Patient Progress  Patient progress: stable    ED Course       Procedures

## 2017-01-25 NOTE — PROGRESS NOTES
Patient resting in bed quietly. Alert and oriented. Patient is blind in both eyes. Patient IV infusing NS 75ml/hr. Patient eating breakfast on her own. Verbalizes no discomfort or pain at this time.

## 2017-01-25 NOTE — ED TRIAGE NOTES
Pt in c/o abdominal pain with vomiting and diarrhea starting today. Pt daughter states pt vomited once this morning and has had 3 occurrences of diarrhea. Denies fever. Denies sob. Denies urinary symptoms.

## 2017-01-25 NOTE — ED NOTES
TRANSFER - OUT REPORT:    Verbal report given to Angela macario(name) on Shazia Pleitez  being transferred to 339(unit) for routine progression of care       Report consisted of patients Situation, Background, Assessment and   Recommendations(SBAR). Information from the following report(s) ED Summary was reviewed with the receiving nurse. Lines:   Peripheral IV 01/24/17 Right Forearm (Active)   Site Assessment Clean, dry, & intact 1/24/2017 10:56 PM   Phlebitis Assessment 0 1/24/2017 10:56 PM   Infiltration Assessment 0 1/24/2017 10:56 PM        Opportunity for questions and clarification was provided.       Patient transported with:   Registered Nurse

## 2017-01-25 NOTE — PROGRESS NOTES
Pt arrived to the floor via stretcher and placed in bed with siderails up. Pt A&Ox4. Pt oriented to bed functions and call light. Pt blind in both eyes. Pt connected to IVF. PIV in right arm patent. Admission assessment completed. VSS. Bed low and locked. Will continue to monitor.

## 2017-01-25 NOTE — H&P
HOSPITALIST H&P    NAME:  Claretha Lesches   Age:  80 y.o.  :   3/13/1924   MRN:   [id number]  PCP: Kenneth Adler MD  Chief complaint: abd pain    Subjective:     Patient is a 80 y.o.  female who presents with abdominal pain. Pt has h/o SBO several years ago. Her daughter  last week and pt has been grieving. She hasn't been eating or drinking well the last several days. She developed nausea and diarrhea about 2 days ago. Pain is across lower abdomen and constant. She denies chest pain, dyspnea, or fever. Past Medical History   Diagnosis Date    Blindness - both eyes 0    CKD (chronic kidney disease) stage 3, GFR 30-59 ml/min      baseline creatinine 1.8    Hypertension     Other ill-defined conditions(799.89)      shingles       Past Surgical History   Procedure Laterality Date    Hx heent       Glaucoma    Hx gi       bowel obstruction/resection       No current facility-administered medications on file prior to encounter. Current Outpatient Prescriptions on File Prior to Encounter   Medication Sig Dispense Refill    nepafenac (NEVANAC) 0.1 % ophthalmic suspension Administer 1 Drop to both eyes two (2) times daily as needed for Pain.  metoprolol (LOPRESSOR) 50 mg tablet Take 50 mg by mouth two (2) times a day.  iron, carbonyl (FEOSOL) 45 mg tab Take 1 Tab by mouth daily.  erythromycin (ILOTYCIN) ophthalmic ointment Administer  to right eye nightly.  tramadol (ULTRAM) 50 mg tablet Take 1 tablet by mouth every six (6) hours as needed for Pain. 20 tablet 0    aspirin 81 mg tablet Take 81 mg by mouth.  latanoprost (XALATAN) 0.005 % ophthalmic solution Administer 1 Drop to both eyes nightly.  timolol (BETIMOL) 0.5 % ophthalmic solution Administer 1 Drop to both eyes daily.  use in affected eye(s)         No Known Allergies    Social History   Substance Use Topics    Smoking status: Never Smoker    Smokeless tobacco: Never Used    Alcohol use No Family History   Problem Relation Age of Onset    Hypertension Other        I have personally reviewed and reconciled patients history. Review of Systems  A comprehensive 12 point review of systems is negative other than what is listed above. Objective:     Patient Vitals for the past 24 hrs:   BP Temp Pulse Resp SpO2 Height Weight   01/1934 110/46 - - - - - -   01/24/17 1930 97/45 97.4 °F (36.3 °C) 62 17 95 % 5' 4\" (1.626 m) 45.4 kg (100 lb)       Exam:  General: awake, alert, no apparent distress  Eyes: anicteric  Neck: Supple, trachea midline  Lungs: Clear to auscultation bilaterally. No rales, wheezes, or rhonchi  Heart: Regular rate and rhythm. No appreciable murmur. Abdomen: Soft, mild lower abdominal discomfort to palpation, nondistended. Bowel sounds normal.  No rebound tenderness, guarding, or rigidity. Extremities:  No LE edema. Skin: Warm/dry. No rashes or lesions. Neurologic: moves all extremities  Psych: Alert. Tearful at times. Data Review (Labs):   Recent Results (from the past 24 hour(s))   CBC WITH AUTOMATED DIFF    Collection Time: 01/24/17 10:56 PM   Result Value Ref Range    WBC 8.4 4.3 - 11.1 K/uL    RBC 3.82 (L) 4.05 - 5.25 M/uL    HGB 11.1 (L) 11.7 - 15.4 g/dL    HCT 34.7 (L) 35.8 - 46.3 %    MCV 90.8 79.6 - 97.8 FL    MCH 29.1 26.1 - 32.9 PG    MCHC 32.0 31.4 - 35.0 g/dL    RDW 13.4 11.9 - 14.6 %    PLATELET 048 007 - 646 K/uL    MPV 11.3 10.8 - 14.1 FL    DF AUTOMATED      NEUTROPHILS 70 43 - 78 %    LYMPHOCYTES 17 13 - 44 %    MONOCYTES 12 4.0 - 12.0 %    EOSINOPHILS 1 0.5 - 7.8 %    BASOPHILS 0 0.0 - 2.0 %    IMMATURE GRANULOCYTES 0.1 0.0 - 5.0 %    ABS. NEUTROPHILS 5.9 1.7 - 8.2 K/UL    ABS. LYMPHOCYTES 1.4 0.5 - 4.6 K/UL    ABS. MONOCYTES 1.0 0.1 - 1.3 K/UL    ABS. EOSINOPHILS 0.1 0.0 - 0.8 K/UL    ABS. BASOPHILS 0.0 0.0 - 0.2 K/UL    ABS. IMM.  GRANS. 0.0 0.0 - 0.5 K/UL   METABOLIC PANEL, COMPREHENSIVE    Collection Time: 01/24/17 10:56 PM   Result Value Ref Range    Sodium 140 136 - 145 mmol/L    Potassium 5.8 (H) 3.5 - 5.1 mmol/L    Chloride 104 98 - 107 mmol/L    CO2 26 21 - 32 mmol/L    Anion gap 10 7 - 16 mmol/L    Glucose 109 (H) 65 - 100 mg/dL    BUN 73 (H) 8 - 23 MG/DL    Creatinine 2.97 (H) 0.6 - 1.0 MG/DL    GFR est AA 19 (L) >60 ml/min/1.73m2    GFR est non-AA 16 (L) >60 ml/min/1.73m2    Calcium 9.2 8.3 - 10.4 MG/DL    Bilirubin, total 0.6 0.2 - 1.1 MG/DL    ALT 26 12 - 65 U/L    AST 39 (H) 15 - 37 U/L    Alk.  phosphatase 40 (L) 50 - 136 U/L    Protein, total 8.6 (H) 6.3 - 8.2 g/dL    Albumin 4.1 3.2 - 4.6 g/dL    Globulin 4.5 (H) 2.3 - 3.5 g/dL    A-G Ratio 0.9 (L) 1.2 - 3.5     LIPASE    Collection Time: 01/24/17 10:56 PM   Result Value Ref Range    Lipase 384 73 - 393 U/L   POTASSIUM    Collection Time: 01/25/17 12:35 AM   Result Value Ref Range    Potassium 5.6 (H) 3.5 - 5.1 mmol/L       Assessment:   Principal Problem:    GLADYS (acute kidney injury) ([facility] [address] (1/25/2017)    Active Problems:    Chronic kidney disease, stage III (moderate) (5/3/2013)      Hyperkalemia (8/8/2015)      Diarrhea (1/25/2017)      Nausea (1/25/2017)        Plan:     Admit to medical floor  IVF  Follow Cr and K    DVT prophylaxis: sq heparin  Code Status: FULL    Plan of care discussed with: patient/family  Time spent on patient care: 30 minutes  Anticipated date of discharge: 2-3 days    Duncan Horner DO

## 2017-01-26 NOTE — PROGRESS NOTES
Patient resting quietly in bed. Family at bedside. Voiding yellow, clear urine using the bedside commode. Patient is NPO for lleus, NG tube to low suction continuous. No vomiting. Denies any pain or discomfort at this time.

## 2017-01-26 NOTE — PROGRESS NOTES
Patient is A&Ox4. Able to verbalize needs. No distress noted. Family at bedside. Neurovascular and peripheral vascular checks WNL. Voiding clear yellow urine. Ambulates with walker. C/O abdominal pain and nausea. Abdomen is distended and tender to touch. Bowel sounds in all 4 quadrants. States she feels like she needs to have a bowel movement but can't. Hospitalist notified. New telephone orders read back and verified. Patient is now NPO and awaiting xray. Family aware. Bed low and locked. Call light within reach. Instructed to call for assistance. Patient verbalizes understanding. Will monitor.

## 2017-01-26 NOTE — PROGRESS NOTES
Hospitalist Progress Note    Subjective:   Daily Progress Note: 2017 6:58 PM    Summary:  Patient presented to ER 17 with complaints of nausea, vomiting, right lower quad abdominal pain and diarrhea for the 48 hours prior to presentation. No fever, any additional symptoms. Three episodes of diarrhea on day of presentation. Patient is blind. Daughter  four days ago, and she has been taking in very little po. She has history of SBO with bowel resection, CKD III, hypertension. She is admitted with IV fluids. Old notes indicate DNR, however, patient is listed as full code. Baseline ambulation with walker. Today:  Patient found with tender, distended abdomen later on day of admission. Had bowel sounds in all four quads. States she feels constipated. Vomited x 1. Made NPO, abdominal x-ray indicates ileus, and N/G was placed. Spiked temp to 100.7 this am. Currently sleeping quietly. Bowel sounds auscultated, but sluggish. Abdomen slightly distended, but soft. No diarrhea since admission per daughter at bedside. No nausea. N/G draining small amounts of dark green gastric fluid.       Current Facility-Administered Medications   Medication Dose Route Frequency    acetaminophen (TYLENOL) suppository 650 mg  650 mg Rectal Q4H PRN    aspirin delayed-release tablet 81 mg  81 mg Oral DAILY    latanoprost (XALATAN) 0.005 % ophthalmic solution 1 Drop  1 Drop Both Eyes QHS    metoprolol tartrate (LOPRESSOR) tablet 50 mg  50 mg Oral BID    timolol (TIMOPTIC) 0.5 % ophthalmic solution 1 Drop  1 Drop Both Eyes DAILY    sodium chloride (NS) flush 5-10 mL  5-10 mL IntraVENous Q8H    sodium chloride (NS) flush 5-10 mL  5-10 mL IntraVENous PRN    0.9% sodium chloride infusion  100 mL/hr IntraVENous CONTINUOUS    acetaminophen (TYLENOL) tablet 650 mg  650 mg Oral Q6H PRN    ondansetron (ZOFRAN) injection 4 mg  4 mg IntraVENous Q6H PRN    heparin (porcine) injection 5,000 Units  5,000 Units SubCUTAneous Q8H    promethazine (PHENERGAN) injection 6.25 mg  6.25 mg IntraMUSCular Q6H PRN    morphine injection 2 mg  2 mg IntraVENous Q3H PRN        Review of Systems  Patient is unable. Objective:     Visit Vitals    /50 (BP 1 Location: Right arm, BP Patient Position: At rest)    Pulse 95    Temp 100.3 °F (37.9 °C)    Resp 18    Ht 5' 4\" (1.626 m)    Wt 45.4 kg (100 lb)    SpO2 94%    Breastfeeding No    BMI 17.16 kg/m2      O2 Device: Room air    Temp (24hrs), Av.9 °F (37.2 °C), Min:97.3 °F (36.3 °C), Max:100.7 °F (38.2 °C)      701 - 1900  In: -   Out: 400   1901 -  0700  In: 6033 [P.O.:420; I.V.:623]  Out: 1450     General appearance: Sleeping quietly, not restless. Roused briefly on exam.  Frail. Daughter at bedside. Denies pain at present. Head: Normocephalic, without obvious abnormality, atraumatic  Eyes: conjunctivae/corneas clear. PERRL,  Neck: supple, symmetrical, trachea midline, no JVD  Lungs: clear to auscultation bilaterally, slightly diminished in bases. Heart: tachycardic in the low 100's. Regular rate and rhythm, S1, S2 normal, no murmur, click, rub or gallop  Abdomen: soft, slightly distended. Tender to moderate touch in lower quads, no pain without touch. Bowel sounds present, but sluggish. No masses,  no organomegaly  Extremities: extremities normal, atraumatic, no cyanosis or edema  Skin: Skin color pale, texture, turgor thin and dry. No rashes or lesions  Neurologic: Grossly normal, no unilateral deficit or new findings. Additional comments:  Notes, orders, test results, vitals reviewed.      Data Review    Recent Results (from the past 24 hour(s))   METABOLIC PANEL, BASIC    Collection Time: 17  5:10 AM   Result Value Ref Range    Sodium 145 136 - 145 mmol/L    Potassium 4.8 3.5 - 5.1 mmol/L    Chloride 111 (H) 98 - 107 mmol/L    CO2 27 21 - 32 mmol/L    Anion gap 7 7 - 16 mmol/L    Glucose 101 (H) 65 - 100 mg/dL    BUN 59 (H) 8 - 23 MG/DL    Creatinine 2.66 (H) 0.6 - 1.0 MG/DL    GFR est AA 22 (L) >60 ml/min/1.73m2    GFR est non-AA 18 (L) >60 ml/min/1.73m2    Calcium 8.5 8.3 - 10.4 MG/DL   CBC WITH AUTOMATED DIFF    Collection Time: 01/26/17  5:10 AM   Result Value Ref Range    WBC 6.9 4.3 - 11.1 K/uL    RBC 3.00 (L) 4.05 - 5.25 M/uL    HGB 8.7 (L) 11.7 - 15.4 g/dL    HCT 27.6 (L) 35.8 - 46.3 %    MCV 92.0 79.6 - 97.8 FL    MCH 29.0 26.1 - 32.9 PG    MCHC 31.5 31.4 - 35.0 g/dL    RDW 13.3 11.9 - 14.6 %    PLATELET 002 031 - 706 K/uL    MPV 10.5 (L) 10.8 - 14.1 FL    DF AUTOMATED      NEUTROPHILS 72 43 - 78 %    LYMPHOCYTES 16 13 - 44 %    MONOCYTES 12 4.0 - 12.0 %    EOSINOPHILS 0 (L) 0.5 - 7.8 %    BASOPHILS 0 0.0 - 2.0 %    IMMATURE GRANULOCYTES 0.0 0.0 - 5.0 %    ABS. NEUTROPHILS 4.9 1.7 - 8.2 K/UL    ABS. LYMPHOCYTES 1.1 0.5 - 4.6 K/UL    ABS. MONOCYTES 0.8 0.1 - 1.3 K/UL    ABS. EOSINOPHILS 0.0 0.0 - 0.8 K/UL    ABS. BASOPHILS 0.0 0.0 - 0.2 K/UL    ABS. IMM. GRANS. 0.0 0.0 - 0.5 K/UL     CT ABDOMEN AND PELVIS:  Findings most consistent with a gastroenteritis. No evidence of obstruction. 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 Status post cholecystectomy. Evaluation of the abdominal viscera is limited without intravenous contrast    ABDOMINAL SERIES:  Ileus with diffuse gaseous distention of the large and small bowel. Enteric contrast is present in the right hemicolon.      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

## 2017-01-27 NOTE — PROGRESS NOTES
Rechecked abnormal Vital signs, B/P-162/66, HR-109, Temp 100.3 oral.  Notified hospitalist of abnormal VS.

## 2017-01-27 NOTE — PROGRESS NOTES
Hospitalist Progress Note    2017  Admit Date: 2017  9:56 PM   NAME: Kathy Painting   :  3/13/1924   MRN:  [id number]   Attending: Kat Horowitz MD  PCP:  Lionel Khanna MD  Treatment Team: Attending Provider: Kat Horowitz MD; Utilization Review: Cristina Abdi    Full Code   SUBJECTIVE:   Ms. Prkaash Mosquera is a 81 yo female who presented with c/o n/v/d RLQ abd pain X2 days prior to admission. CT on admission without signs of obstruction. Pt with hyperkalemia, GLADYS on admission. K normal today, creatinine improving. Pt became tender on abd exam, KUB showing ileus. NG placed. Pt spiked fever yesterday and continues with intermittent low grade fevers. No family at bedside. Pt states \"I am feeling ok\". Denies CP, SOB. C/o abd pain, \"pain where that tube in my nose is\". Past Medical History   Diagnosis Date    Blindness - both eyes     CKD (chronic kidney disease) stage 3, GFR 30-59 ml/min      baseline creatinine 1.8    Hypertension     Other ill-defined conditions(799.89)      shingles     Recent Results (from the past 24 hour(s))   METABOLIC PANEL, COMPREHENSIVE    Collection Time: 17 12:57 PM   Result Value Ref Range    Sodium 153 (H) 136 - 145 mmol/L    Potassium 4.2 3.5 - 5.1 mmol/L    Chloride 117 (H) 98 - 107 mmol/L    CO2 26 21 - 32 mmol/L    Anion gap 10 7 - 16 mmol/L    Glucose 96 65 - 100 mg/dL    BUN 44 (H) 8 - 23 MG/DL    Creatinine 1.98 (H) 0.6 - 1.0 MG/DL    GFR est AA 30 (L) >60 ml/min/1.73m2    GFR est non-AA 25 (L) >60 ml/min/1.73m2    Calcium 8.3 8.3 - 10.4 MG/DL    Bilirubin, total 0.6 0.2 - 1.1 MG/DL    ALT 34 12 - 65 U/L    AST 26 15 - 37 U/L    Alk.  phosphatase 48 (L) 50 - 136 U/L    Protein, total 7.0 6.3 - 8.2 g/dL    Albumin 2.7 (L) 3.2 - 4.6 g/dL    Globulin 4.3 (H) 2.3 - 3.5 g/dL    A-G Ratio 0.6 (L) 1.2 - 3.5     PROCALCITONIN    Collection Time: 17 12:57 PM   Result Value Ref Range    Procalcitonin 1.0 ng/mL   CBC WITH AUTOMATED DIFF Collection Time: 01/27/17 12:57 PM   Result Value Ref Range    WBC 8.1 4.3 - 11.1 K/uL    RBC 2.77 (L) 4.05 - 5.25 M/uL    HGB 7.9 (L) 11.7 - 15.4 g/dL    HCT 25.6 (L) 35.8 - 46.3 %    MCV 92.4 79.6 - 97.8 FL    MCH 28.5 26.1 - 32.9 PG    MCHC 30.9 (L) 31.4 - 35.0 g/dL    RDW 13.1 11.9 - 14.6 %    PLATELET 123 764 - 804 K/uL    MPV 10.7 (L) 10.8 - 14.1 FL    DF AUTOMATED      NEUTROPHILS 71 43 - 78 %    LYMPHOCYTES 13 13 - 44 %    MONOCYTES 15 (H) 4.0 - 12.0 %    EOSINOPHILS 1 0.5 - 7.8 %    BASOPHILS 0 0.0 - 2.0 %    IMMATURE GRANULOCYTES 0.2 0.0 - 5.0 %    ABS. NEUTROPHILS 5.7 1.7 - 8.2 K/UL    ABS. LYMPHOCYTES 1.1 0.5 - 4.6 K/UL    ABS. MONOCYTES 1.2 0.1 - 1.3 K/UL    ABS. EOSINOPHILS 0.0 0.0 - 0.8 K/UL    ABS. BASOPHILS 0.0 0.0 - 0.2 K/UL    ABS. IMM.  GRANS. 0.0 0.0 - 0.5 K/UL   LACTIC ACID, PLASMA    Collection Time: 01/27/17 12:57 PM   Result Value Ref Range    Lactic acid 0.8 0.4 - 2.0 MMOL/L     No Known Allergies  Current Facility-Administered Medications   Medication Dose Route Frequency Provider Last Rate Last Dose    acetaminophen (TYLENOL) suppository 650 mg  650 mg Rectal Q4H PRN Carrington Hsieh NP   650 mg at 01/26/17 1250    aspirin delayed-release tablet 81 mg  81 mg Oral DAILY Palomo Egan DO   81 mg at 01/27/17 0911    latanoprost (XALATAN) 0.005 % ophthalmic solution 1 Drop  1 Drop Both Eyes QHS Palomo Egan DO   1 Drop at 01/26/17 2200    metoprolol tartrate (LOPRESSOR) tablet 50 mg  50 mg Oral BID Palomo Egan DO   50 mg at 01/27/17 0911    timolol (TIMOPTIC) 0.5 % ophthalmic solution 1 Drop  1 Drop Both Eyes DAILY Palomo Egan DO   1 Drop at 01/27/17 4168    sodium chloride (NS) flush 5-10 mL  5-10 mL IntraVENous Q8H Palomo Egan DO   10 mL at 01/27/17 3964    sodium chloride (NS) flush 5-10 mL  5-10 mL IntraVENous PRN Palomo Egan DO        0.9% sodium chloride infusion  100 mL/hr IntraVENous CONTINUOUS Hiral Cooper  mL/hr at 17 1142 100 mL/hr at 17 1142    acetaminophen (TYLENOL) tablet 650 mg  650 mg Oral Q6H PRN Temi Dos Rios, DO   650 mg at 17 1142    ondansetron (ZOFRAN) injection 4 mg  4 mg IntraVENous Q6H PRN Temi Cyril, DO   4 mg at 17 2220    heparin (porcine) injection 5,000 Units  5,000 Units SubCUTAneous Q8H Temi Dos Rios, DO        promethazine (PHENERGAN) injection 6.25 mg  6.25 mg IntraMUSCular Q6H PRN Sharcarlos eduardoe Huguenin, NP   6.25 mg at 17 1726    morphine injection 2 mg  2 mg IntraVENous Q3H PRN Temi Dos Rios, DO   2 mg at 17 0114       Review of Systems negative with exception of pertinent positives noted above  PHYSICAL EXAM     Visit Vitals    /67 (BP 1 Location: Left arm, BP Patient Position: At rest)    Pulse 91    Temp 97.9 °F (36.6 °C)    Resp 16    Ht 5' 4\" (1.626 m)    Wt 45.4 kg (100 lb)    SpO2 93%    Breastfeeding No    BMI 17.16 kg/m2      Temp (24hrs), Av °F (37.2 °C), Min:96.5 °F (35.8 °C), Max:100.3 °F (37.9 °C)    Oxygen Therapy  O2 Sat (%): 93 % (17 1529)  Pulse via Oximetry: 70 beats per minute (17 0255)  O2 Device: Room air (17 1930)    Intake/Output Summary (Last 24 hours) at 17 1715  Last data filed at 17 1142   Gross per 24 hour   Intake             3241 ml   Output              800 ml   Net             2441 ml      General: Frail, NAD  Lungs: CTA bilaterally. Resp even and nonlabored  Heart:  S1S2 present without murmurs rubs gallops. RRR. No edema  Abdomen: Soft, slightly distended. Pain with palpation all quadrants.   NG in place with green output  Extremities: Muscle atrophy, moves ext well  Neurologic:  No focal deficits  Psych: Drowsy, oriented X3    Results summary of Diagnostic Studies/Procedures copied from within [facility] EMR:         [address] Problems    Diagnosis Date Noted    GLADYS (acute kidney injury) ([facility] [address] 2017    Diarrhea 2017    Nausea 01/25/2017    Hyperkalemia 08/08/2015    Chronic kidney disease, stage III (moderate) 05/03/2013     Plan:    Hyperkalemia: resolved    Fever: obtain CXR, BC X2, UA, lactic acid    Ileus: continue NGT, consult general surgery    Notes, labs, VS, diagnostic testing reviewed  Case discussed with pt, care team, Dr. Ruben Liang      DVT Prophylaxis: heparin sq  Plan of Care Discussed with: Supervising MD Dr. Ruben Liang, care team, pt   Gaurav Gamboa NP

## 2017-01-27 NOTE — PROGRESS NOTES
Patient is alert and oriented x4. Able to verbalize needs. Resting quietly with no distress noted. NG tube in place and patent, on intermittent suction. Abdomen is distended and tender to touch. Active bowel sounds in all 4 quadrants. Neurovascular and peripheral vascular checks WNL. Voiding clear yellow urine. Bed low and locked. Call light within reach. Instructed to call for assistance. Patient verbalizes understanding. Will monitor.

## 2017-01-27 NOTE — PROGRESS NOTES
Notified by Malinda Ledesma RN of MEWS score of 3. S. Omkar Mcmillan NP aware and monitoring pt. Will follow closely.

## 2017-01-28 NOTE — PROGRESS NOTES
Patient is A&Ox4. Able to verbalize needs. Resting quietly with no distress noted. Shift assessment completed. NG tube connected to intermittent suction. Pt blind in both eyes. Eye drops given in both eyes. Neurovascular and peripheral vascular checks WNL. Voiding clear yellow urine. Wearing incontinent briefs. PIV infusing. Dressing to PIV intact with no S/S of infection. Denies needs. Bed low and locked. Call light within reach. Instructed to call for assistance. Patient verbalizes understanding. Family members at bedside. Will continue to monitor.

## 2017-01-28 NOTE — PROGRESS NOTES
Hospitalist Progress Note    Subjective:   Daily Progress Note: 2017 7:26 AM    Summary:    Patient presented to ER 17 with complaints of nausea, vomiting, right lower quad abdominal pain and diarrhea for the 48 hours prior to presentation. No fever, any additional symptoms. Three episodes of diarrhea on day of presentation. Patient is blind. Daughter  four days ago, and she has been taking in very little po. She has history of SBO with bowel resection, CKD III, hypertension. She is admitted with IV fluids. Old notes indicate DNR, however, patient is listed as full code. Baseline ambulation with walker. She is found with ileus and N/G is placed, draining dark green fluid. Had bowel sounds, very small amount of gas passed. No further diarrhea. She began low grade fever  too 100.7 . CXR as noted below    Today:  Begun on vancomycin and zosyn for possible HCAP. Transfuse for 7.5 hgb with one unit PRBC, on admission was 11.1. Heme stool pending. Only complaint is N/G tube. Active bowel sounds in all four quad. Abdomen more firm than when I saw her day before yesterday. Denies pain. States she is passing small amounts of gas. No BM since admission. Will get repeat KUB today. Sodium and cl- elevated, changing IVF to half strength saline. Son at bedside. Blind, very frail.          Current Facility-Administered Medications   Medication Dose Route Frequency    acetaminophen (TYLENOL) suppository 650 mg  650 mg Rectal Q4H PRN    aspirin delayed-release tablet 81 mg  81 mg Oral DAILY    latanoprost (XALATAN) 0.005 % ophthalmic solution 1 Drop  1 Drop Both Eyes QHS    metoprolol tartrate (LOPRESSOR) tablet 50 mg  50 mg Oral BID    timolol (TIMOPTIC) 0.5 % ophthalmic solution 1 Drop  1 Drop Both Eyes DAILY    sodium chloride (NS) flush 5-10 mL  5-10 mL IntraVENous Q8H    sodium chloride (NS) flush 5-10 mL  5-10 mL IntraVENous PRN    0.9% sodium chloride infusion  100 mL/hr IntraVENous CONTINUOUS    acetaminophen (TYLENOL) tablet 650 mg  650 mg Oral Q6H PRN    ondansetron (ZOFRAN) injection 4 mg  4 mg IntraVENous Q6H PRN    heparin (porcine) injection 5,000 Units  5,000 Units SubCUTAneous Q8H    promethazine (PHENERGAN) injection 6.25 mg  6.25 mg IntraMUSCular Q6H PRN    morphine injection 2 mg  2 mg IntraVENous Q3H PRN        Review of Systems  Patient is unable. Objective:     Visit Vitals    /76    Pulse 70    Temp 98.1 °F (36.7 °C)    Resp 16    Ht 5' 4\" (1.626 m)    Wt 45.4 kg (100 lb)    SpO2 96%    Breastfeeding No    BMI 17.16 kg/m2      O2 Device: Room air    Temp (24hrs), Av.9 °F (37.2 °C), Min:97.9 °F (36.6 °C), Max:100.3 °F (37.9 °C)        1901 -  0700  In: 7183 [P.O.:200; I.V.:3041]  Out: 2200 [Urine:700]    General appearance: Awakened for exam.  Son at bedside. Complains of generalized abdominal pain. No gas passing today. Appears frail and ill. Also complains of N/G discomfort. N/G draining moderate amounts dark green gastric fluid. Head: Normocephalic, without obvious abnormality, atraumatic  Eyes: Blind  Neck: supple, symmetrical, trachea midline, no JVD  Lungs: clear to auscultation bilaterally, diminished in bases  Heart: regular rate and rhythm, S1, S2 normal, no murmur, click, rub or gallop. Intermittent mild tachycardia  Abdomen: Firm, distended, painful to touch. Very faint bowel sounds throughout. Did not pass gas today. No masses,  no organomegaly  Extremities: extremities with poor muscle tone, no cyanosis or edema  Skin: Skin color, texture, turgor thin, pale. No rashes or lesions  Neurologic: Grossly normal, no unilateral deficit. Additional comments:  Notes, orders, test results reviewed.      Data Review    Recent Results (from the past 24 hour(s))   METABOLIC PANEL, COMPREHENSIVE    Collection Time: 17 12:57 PM   Result Value Ref Range    Sodium 153 (H) 136 - 145 mmol/L    Potassium 4.2 3.5 - 5.1 mmol/L Chloride 117 (H) 98 - 107 mmol/L    CO2 26 21 - 32 mmol/L    Anion gap 10 7 - 16 mmol/L    Glucose 96 65 - 100 mg/dL    BUN 44 (H) 8 - 23 MG/DL    Creatinine 1.98 (H) 0.6 - 1.0 MG/DL    GFR est AA 30 (L) >60 ml/min/1.73m2    GFR est non-AA 25 (L) >60 ml/min/1.73m2    Calcium 8.3 8.3 - 10.4 MG/DL    Bilirubin, total 0.6 0.2 - 1.1 MG/DL    ALT 34 12 - 65 U/L    AST 26 15 - 37 U/L    Alk. phosphatase 48 (L) 50 - 136 U/L    Protein, total 7.0 6.3 - 8.2 g/dL    Albumin 2.7 (L) 3.2 - 4.6 g/dL    Globulin 4.3 (H) 2.3 - 3.5 g/dL    A-G Ratio 0.6 (L) 1.2 - 3.5     PROCALCITONIN    Collection Time: 01/27/17 12:57 PM   Result Value Ref Range    Procalcitonin 1.0 ng/mL   CBC WITH AUTOMATED DIFF    Collection Time: 01/27/17 12:57 PM   Result Value Ref Range    WBC 8.1 4.3 - 11.1 K/uL    RBC 2.77 (L) 4.05 - 5.25 M/uL    HGB 7.9 (L) 11.7 - 15.4 g/dL    HCT 25.6 (L) 35.8 - 46.3 %    MCV 92.4 79.6 - 97.8 FL    MCH 28.5 26.1 - 32.9 PG    MCHC 30.9 (L) 31.4 - 35.0 g/dL    RDW 13.1 11.9 - 14.6 %    PLATELET 314 033 - 932 K/uL    MPV 10.7 (L) 10.8 - 14.1 FL    DF AUTOMATED      NEUTROPHILS 71 43 - 78 %    LYMPHOCYTES 13 13 - 44 %    MONOCYTES 15 (H) 4.0 - 12.0 %    EOSINOPHILS 1 0.5 - 7.8 %    BASOPHILS 0 0.0 - 2.0 %    IMMATURE GRANULOCYTES 0.2 0.0 - 5.0 %    ABS. NEUTROPHILS 5.7 1.7 - 8.2 K/UL    ABS. LYMPHOCYTES 1.1 0.5 - 4.6 K/UL    ABS. MONOCYTES 1.2 0.1 - 1.3 K/UL    ABS. EOSINOPHILS 0.0 0.0 - 0.8 K/UL    ABS. BASOPHILS 0.0 0.0 - 0.2 K/UL    ABS. IMM.  GRANS. 0.0 0.0 - 0.5 K/UL   LACTIC ACID, PLASMA    Collection Time: 01/27/17 12:57 PM   Result Value Ref Range    Lactic acid 0.8 0.4 - 2.0 MMOL/L   URINALYSIS W/ RFLX MICROSCOPIC    Collection Time: 01/27/17  7:45 PM   Result Value Ref Range    Color YELLOW      Appearance CLEAR      Specific gravity 1.012 1.001 - 1.023      pH (UA) 6.0 5.0 - 9.0      Protein 30 (A) NEG mg/dL    Glucose NEGATIVE  mg/dL    Ketone TRACE (A) NEG mg/dL    Bilirubin NEGATIVE  NEG      Blood NEGATIVE  NEG Urobilinogen 0.2 0.2 - 1.0 EU/dL    Nitrites NEGATIVE  NEG      Leukocyte Esterase NEGATIVE  NEG      WBC 0 0 /hpf    RBC 5-10 0 /hpf    Epithelial cells 0 0 /hpf    Bacteria 0 0 /hpf    Casts 0 0 /lpf   CBC WITH AUTOMATED DIFF    Collection Time: 01/28/17  5:24 AM   Result Value Ref Range    WBC 7.6 4.3 - 11.1 K/uL    RBC 2.54 (L) 4.05 - 5.25 M/uL    HGB 7.5 (L) 11.7 - 15.4 g/dL    HCT 23.2 (L) 35.8 - 46.3 %    MCV 91.3 79.6 - 97.8 FL    MCH 29.5 26.1 - 32.9 PG    MCHC 32.3 31.4 - 35.0 g/dL    RDW 13.0 11.9 - 14.6 %    PLATELET 599 545 - 655 K/uL    MPV 11.0 10.8 - 14.1 FL    DF AUTOMATED      NEUTROPHILS 67 43 - 78 %    LYMPHOCYTES 17 13 - 44 %    MONOCYTES 15 (H) 4.0 - 12.0 %    EOSINOPHILS 1 0.5 - 7.8 %    BASOPHILS 0 0.0 - 2.0 %    IMMATURE GRANULOCYTES 0.1 0.0 - 5.0 %    ABS. NEUTROPHILS 5.0 1.7 - 8.2 K/UL    ABS. LYMPHOCYTES 1.3 0.5 - 4.6 K/UL    ABS. MONOCYTES 1.2 0.1 - 1.3 K/UL    ABS. EOSINOPHILS 0.1 0.0 - 0.8 K/UL    ABS. BASOPHILS 0.0 0.0 - 0.2 K/UL    ABS. IMM. GRANS. 0.0 0.0 - 0.5 K/UL   METABOLIC PANEL, COMPREHENSIVE    Collection Time: 01/28/17  5:24 AM   Result Value Ref Range    Sodium 152 (H) 136 - 145 mmol/L    Potassium 3.7 3.5 - 5.1 mmol/L    Chloride 118 (H) 98 - 107 mmol/L    CO2 25 21 - 32 mmol/L    Anion gap 9 7 - 16 mmol/L    Glucose 99 65 - 100 mg/dL    BUN 37 (H) 8 - 23 MG/DL    Creatinine 1.69 (H) 0.6 - 1.0 MG/DL    GFR est AA 36 (L) >60 ml/min/1.73m2    GFR est non-AA 30 (L) >60 ml/min/1.73m2    Calcium 8.4 8.3 - 10.4 MG/DL    Bilirubin, total 0.5 0.2 - 1.1 MG/DL    ALT 31 12 - 65 U/L    AST 26 15 - 37 U/L    Alk. phosphatase 47 (L) 50 - 136 U/L    Protein, total 6.6 6.3 - 8.2 g/dL    Albumin 2.5 (L) 3.2 - 4.6 g/dL    Globulin 4.1 (H) 2.3 - 3.5 g/dL    A-G Ratio 0.6 (L) 1.2 - 3.5       1/28/17:  KUB:  There is mild gaseous distention of both large and small bowel. No significant change since the previous study 1/25/2017. Nasogastric tube extends to the gastric antrum.  No free air seen on the supine view. IMPRESSION: No significant change    1/27/17:  CXR:  Small left pleural effusion with irregular density at the left lung base most in keeping with atelectasis. Early pneumonia is not excluded    1/25/17:  Acute abdomen:  Ileus with diffuse gaseous distention of the large and small bowel. Enteric contrast is present in the right hemicolon.     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

## 2017-01-28 NOTE — PROGRESS NOTES
Alert and says she has no real pain but that her face feels hot and her lt jaw hurts from the tube in her. Having large amts of thin dark green gastric  contents. She is not confused. Son at bed side. C/o being thirsty and a little hungry.

## 2017-01-28 NOTE — CONSULTS
[address] [address], [address] [facility]  [phone]     History and Physical/Surgical Consult   Charol Bence    Admit date: 2017    MRN: [id number]     : 3/13/1924     Age: 80 y.o.          2017 7:54 AM    Subjective/HPI:   This patient is a 80 y.o. seen and evaluated at the request of Dr. Bard Hemphill. Admitted with 48 hr hx RLQ pain,nausea,vomiting,diarrhea. CT showed ileus,no obstruction. Plain films show enteric contrast in the colon. Main complaint is that of pain secondary to NG. Denies abdominal pain. Says she is passing flatus. Denies nausea. Review of Systems  A comprehensive review of systems was negative except for that written in the HPI. Past Medical History   Diagnosis Date    Blindness - both eyes     CKD (chronic kidney disease) stage 3, GFR 30-59 ml/min      baseline creatinine 1.8    Hypertension     Other ill-defined conditions(799.89)      shingles      Past Surgical History   Procedure Laterality Date    Hx heent       Glaucoma    Hx gi       bowel obstruction/resection      No Known Allergies   Social History   Substance Use Topics    Smoking status: Never Smoker    Smokeless tobacco: Never Used    Alcohol use No      Social History     Social History Narrative    Pt lives with her daughter     Pt desires a natural death and would not want a ventilator    DNR. Bel Sahu, DO             Family History   Problem Relation Age of Onset    Hypertension Other       Prior to Admission Medications   Prescriptions Last Dose Informant Patient Reported? Taking?   aspirin 81 mg tablet   Yes Yes   Sig: Take 81 mg by mouth. erythromycin (ILOTYCIN) ophthalmic ointment   Yes Yes   Sig: Administer  to right eye nightly. iron, carbonyl (FEOSOL) 45 mg tab   Yes Yes   Sig: Take 1 Tab by mouth daily. latanoprost (XALATAN) 0.005 % ophthalmic solution   Yes Yes   Sig: Administer 1 Drop to both eyes nightly.    metoprolol (LOPRESSOR) 50 mg tablet   Yes Yes   Sig: Take 50 mg by mouth two (2) times a day. nepafenac (NEVANAC) 0.1 % ophthalmic suspension   Yes Yes   Sig: Administer 1 Drop to both eyes two (2) times daily as needed for Pain.   timolol (BETIMOL) 0.5 % ophthalmic solution   Yes Yes   Sig: Administer 1 Drop to both eyes daily. use in affected eye(s)   tramadol (ULTRAM) 50 mg tablet   No Yes   Sig: Take 1 tablet by mouth every six (6) hours as needed for Pain.       Facility-Administered Medications: None     Current Facility-Administered Medications   Medication Dose Route Frequency    vancomycin (VANCOCIN) 1,000 mg in 0.9% sodium chloride (MBP/ADV) 250 mL  1,000 mg IntraVENous Q24H    piperacillin-tazobactam (ZOSYN) 4.5 g in 0.9% sodium chloride (MBP/ADV) 100 mL ADV  4.5 g IntraVENous Q8H    0.45% sodium chloride infusion  100 mL/hr IntraVENous CONTINUOUS    acetaminophen (TYLENOL) suppository 650 mg  650 mg Rectal Q4H PRN    aspirin delayed-release tablet 81 mg  81 mg Oral DAILY    latanoprost (XALATAN) 0.005 % ophthalmic solution 1 Drop  1 Drop Both Eyes QHS    metoprolol tartrate (LOPRESSOR) tablet 50 mg  50 mg Oral BID    timolol (TIMOPTIC) 0.5 % ophthalmic solution 1 Drop  1 Drop Both Eyes DAILY    sodium chloride (NS) flush 5-10 mL  5-10 mL IntraVENous Q8H    sodium chloride (NS) flush 5-10 mL  5-10 mL IntraVENous PRN    acetaminophen (TYLENOL) tablet 650 mg  650 mg Oral Q6H PRN    ondansetron (ZOFRAN) injection 4 mg  4 mg IntraVENous Q6H PRN    heparin (porcine) injection 5,000 Units  5,000 Units SubCUTAneous Q8H    promethazine (PHENERGAN) injection 6.25 mg  6.25 mg IntraMUSCular Q6H PRN    morphine injection 2 mg  2 mg IntraVENous Q3H PRN     Objective:     Vitals:    01/27/17 1851 01/28/17 0100 01/28/17 0551 01/28/17 0750   BP: 168/80 171/79 159/76 200/90   Pulse: 93 95 70 (!) 101   Resp: 16 16 16 15   Temp: 98.4 °F (36.9 °C) 98.7 °F (37.1 °C) 98.1 °F (36.7 °C) 99.8 °F (37.7 °C)   SpO2: 96% 94% 96% 94%   Weight:       Height:            01/26 1901 - 01/28 0700  In: 0710 [P.O.:200; I.V.:3041]  Out: 2200 [Urine:700]  Physical Exam:   Gen- the patient is elderly,cachectic and in no acute distress  HEENT- PERRL, EOMI, no scleral icterus       nose without alar flaring or epistaxis                  oral muscosa moist without cyanosis  Neck- no JVD or retractions  Lungs- resp even/unlab   Heart- RRR   Abd- Distended,non tender. No guarding or rebound  Ext- warm without cyanosis. There is mild lower leg edema. Skin- no jaundice or rashes  Neuro- alert and oriented x 3. No gross sensorimotor deficits are present. Data Review   Recent Labs      01/28/17   0524  01/27/17   1257  01/26/17   0510   WBC  7.6  8.1  6.9   HGB  7.5*  7.9*  8.7*   HCT  23.2*  25.6*  27.6*   PLT  222  215  243     Recent Labs      01/28/17   0524  01/27/17   1257  01/26/17   0510   NA  152*  153*  145   K  3.7  4.2  4.8   CL  118*  117*  111*   CO2  25 26 27   GLU  99  96  101*   BUN  37*  44*  59*   CREA  1.69*  1.98*  2.66*       Assessment:Ileus. AVSS. No pain. WBC,Lactic acid normal.     Hospital Problems  Never Reviewed          Codes Class Noted POA    * (Principal)GLADYS (acute kidney injury) ([facility] ICD-10-CM: N17.9  ICD-9-CM: 584.9  1/25/2017 Yes        Diarrhea ICD-10-CM: R19.7  ICD-9-CM: 787.91  1/25/2017 Yes        Nausea ICD-10-CM: R11.0  ICD-9-CM: 787.02  1/25/2017 Yes        Hyperkalemia ICD-10-CM: E87.5  ICD-9-CM: 276.7  8/8/2015 Yes        Chronic kidney disease, stage III (moderate) (Chronic) ICD-10-CM: N18.3  ICD-9-CM: 585.3  5/3/2013 Yes            Plan:Ileus. No indication for surgical intervention. Agree with current management. --    Thank you very much for this referral.  We appreciate the opportunity to participate in this patient's care. Will follow along with above stated plan. Analy Perez M.D.

## 2017-01-28 NOTE — PROGRESS NOTES
Blood transfusion  started at 50 ml/ hr, patients vitals remain unchanged at 5 minute interval with no signs or symptoms of reaction noted. Rate slowly increased to a running rate of 125 ml hr, vitals rechecked and are stable at 15 minutes. Patient tolerating well.

## 2017-01-28 NOTE — PROGRESS NOTES
Patient Mews score 3, will recheck vitals signs and report stautus to Hospitalist on call, paused NG suction to administer AM Blood Pressure medication, patient up for continent void on BSC and back to bed.

## 2017-01-29 NOTE — PROGRESS NOTES
Pharmacokinetic Consult to Pharmacist    Sandrine Ta is a 80 y.o. female being treated for healthcare-associated pneumonia with Zosyn and Vancomycin. Height: 5' 4\" (162.6 cm)  Weight: 45.4 kg (100 lb)  Lab Results   Component Value Date/Time    BUN 30 01/29/2017 04:56 AM    Creatinine 1.75 01/29/2017 04:56 AM    WBC 9.4 01/29/2017 04:56 AM    Procalcitonin 1.0 01/27/2017 12:57 PM      Estimated Creatinine Clearance: 14.7 mL/min (based on Cr of 1.75). CULTURES:  Blood - NGTD    Lab Results   Component Value Date/Time    VANCOMYCIN,RANDOM 10.4 01/29/2017 08:57 AM       Day 2 of vancomycin. Goal trough is 15 - 20. Due to poor renal function, vancomycin continues to be dosed around random levels. Based on random level today, ordered Vancomycin 1 gm IV x 1 dose. Next random level is scheduled for tomorrow at 0900. Pharmacy will continue to follow patient and dose Vancomycin when random level < 20.       Thank you,    Laura Turk, PharmD

## 2017-01-29 NOTE — PROGRESS NOTES
Patient is A&Ox4. Able to verbalize needs. Resting quietly with no distress noted. Shift assessment completed. NG tube connected to intermittent suction. Pt blind in both eyes. Eye drops given in both eyes. Neurovascular and peripheral vascular checks WNL. Voiding clear yellow urine. Wearing incontinent briefs. Multiple loose BMs. PIV infusing. Dressing to PIV intact with no S/S of infection. Denies needs. Bed low and locked. Call light within reach. Instructed to call for assistance. Patient verbalizes understanding. Family members at bedside. Will continue to monitor.

## 2017-01-29 NOTE — PROGRESS NOTES
Changed patient brief, patient had large amount of soft brown stool, cleaned and applied protective ointment to patients buttock and perineum,   repositioned to supine with head of bed elevated 25 degrees, patients family present at bedside.

## 2017-01-29 NOTE — PROGRESS NOTES
Checked residual on NG tube no bile or stomach content on drawback, removed NG tube as ordered, patient tolerated very well. Patient tolerating sips of liquids.

## 2017-01-29 NOTE — PROGRESS NOTES
Patient tolerated sips of ensure 2 oz, instructed to wait 15 minutes and if not nauseated she can drink another 2 oz.

## 2017-01-29 NOTE — PROGRESS NOTES
Blood transfusion completed vitals are stable B/P continues to be elevated, suction to NG turned off patient given oral dose of Metoprolol given,

## 2017-01-30 NOTE — PROGRESS NOTES
Problem: Nutrition Deficit  Goal: *Optimize nutritional status  Nutrition Assessment:   Reason for assessment: Length of stay-day 5. Diet order(s): Clear Liquid , effective 1/30 1015  Food/Nutrition Patient History: Pt admitted with abdominal pain and nausea. Ileus was found but has resolved (1/30). Nursing admission MST reflected pt was eating poorly due to decreased appetite, but no weight loss. However, dietetic intern spoke with patient's daughter and daughter reported that pt has lived with her the past 16 years and had good PO intake prior to admission. Daughter reports at home, patient's usual intake at breakfast is eggs, grits and sausage, and for other meals it is soft foods such as mashed potatoes and soft beans. Reports patient does not have any teeth. She states that patient tolerated clear liquids well today and ate all of her lunch with no nausea or diarrhea. Patient's daughter also says patient loves Ensure. Pertinent Meds: Vanc, Zosyn, Floranex; IVF 0.45% NaCl @ 100 mL/hr  Pertinent labs: Elevated BUN and Cr, low GFR, Na 148, K 3.1  Anthropometrics:Height: 5'4 (162.6 cm), Weight: 45.4 kg (100 lb), Weight Source: Patient stated, Body mass index is 17.2 kg/(m^2). BMI class of Underweight. Macronutrient needs:  EER: 0787-2374 kcal /day (30-35 kcal/kg BW)  EPR: 27-36 grams protein/day (0.6-0.8 grams/kg BW), GFR 34  Intake/Comparative Standards: Clear liquid diet does not meet estimated kcal and protein needs. Nutrition Diagnosis: Inadequate oral intake related to dietary restrictions as evidenced by clear liquid diet. Intervention:  Meals and snacks: Continue clear diet. Advance per MD recommendation.   Nutrition Supplementation: Initiated Ensure Clear at all meals   Coordination of Care: interdisciplinary rounds      Courtney Vila, Dietetic Intern

## 2017-01-30 NOTE — PROGRESS NOTES
Patient resting quietly in bed with family at bedside. Able to verbalize needs. Shift assessment complete. Patient is blind in both eyes. Patient using bedside commode. Voiding yellow, clear urine. PIV infusing. Denies any pain or discomfort at this time. Bowel sounds active and abdomen soft.

## 2017-01-30 NOTE — PROGRESS NOTES
Pharmacokinetic Consult to Pharmacist    Isacc Katharine is a 80 y.o. female being treated for HCAP with Vancomycin and Zosyn. @[email or web address])@  @[email or web address]@  Lab Results   Component Value Date/Time    BUN 28 01/30/2017 07:25 AM    Creatinine 1.81 01/30/2017 07:25 AM    WBC 8.6 01/30/2017 07:25 AM    Procalcitonin 1.0 01/27/2017 12:57 PM      Estimated Creatinine Clearance: 14.2 mL/min (based on Cr of 1.81). CULTURES:  All Micro Results       Procedure Component Value Units Date/Time      CULTURE, BLOOD [[id number]] Collected:  01/27/17 1600    Order Status:  Completed Specimen:  Blood from Blood Updated:  01/28/17 0910     Special Requests: LEFT ANTECUBITAL        Culture result: NO GROWTH AFTER 16 HOURS       CULTURE, BLOOD [[id number]] Collected:  01/27/17 1605    Order Status:  Completed Specimen:  Blood from Blood Updated:  01/28/17 0910     Special Requests: LEFT ARM        Culture result: NO GROWTH AFTER 16 HOURS                 Lab Results   Component Value Date/Time    VANCOMYCIN,RANDOM 18.4 01/30/2017 09:17 AM       Day 3 of vancomycin. Goal trough is 15 - 20. Vancomycin 1 gram IV one time today at 18:00. Vancomycin Random level ordered for tomorrow at 12 pm.  Will continue to follow patient.       Thank you,  Gretta ShinD, BCPS

## 2017-01-30 NOTE — PROGRESS NOTES
Hospitalist Progress Note    Subjective:   Daily Progress Note:  17 1900    Summary:    Patient presented to ER 17 with complaints of nausea, vomiting, right lower quad abdominal pain and diarrhea for the 48 hours prior to presentation. No fever, any additional symptoms. Three episodes of diarrhea on day of presentation. Patient is blind. Daughter  four days ago, and she has been taking in very little po. She has history of SBO with bowel resection, CKD III, hypertension. She is admitted with IV fluids. Old notes indicate DNR, however, patient is listed as full code. Baseline ambulation with walker. She is found with ileus and N/G is placed, draining dark green fluid. Had bowel sounds, very small amount of gas passed. No further diarrhea. She began low grade fever  too 100.7 . Began zosyn and vanc 17 for possible HCAP and empiric gut coverage. Guiac +. Transfused for Hgb of 7.5, down from 11.1 on admission.      Today:  Today:  T max today 99.6, yesterday 100.8. Improved with less N/G drainage. Had large amount soft brown stool, + heme. No melena or overt signs of bleeding. Abdomen remains distended, but more soft. .Daughter and primary caregiver, Brandt Wiggins here today. N/G turned off x 1 hour, drank about 60 ml ensure,asking for more. Tolerated well without nausea or vomiting. N/G removed an hour later. Patient much more comfortable after removal.  KUB still indicating possible ileus. Feeling much better. No cough. Left basilar consolidation.      Current Facility-Administered Medications   Medication Dose Route Frequency    vancomycin (VANCOCIN) 1,000 mg in 0.9% sodium chloride (MBP/ADV) 250 mL  1,000 mg IntraVENous See Admin Instructions    0.45% sodium chloride infusion  100 mL/hr IntraVENous CONTINUOUS    piperacillin-tazobactam (ZOSYN) 4.5 g in 0.9% sodium chloride (MBP/ADV) 100 mL  4.5 g IntraVENous Q12H    0.9% sodium chloride infusion 250 mL  250 mL IntraVENous PRN    diphenhydrAMINE (BENADRYL) injection 12.5 mg  12.5 mg IntraVENous Q4H PRN    hydrALAZINE (APRESOLINE) 20 mg/mL injection 10 mg  10 mg IntraVENous Q4H PRN    acetaminophen (TYLENOL) suppository 650 mg  650 mg Rectal Q4H PRN    aspirin delayed-release tablet 81 mg  81 mg Oral DAILY    latanoprost (XALATAN) 0.005 % ophthalmic solution 1 Drop  1 Drop Both Eyes QHS    metoprolol tartrate (LOPRESSOR) tablet 50 mg  50 mg Oral BID    timolol (TIMOPTIC) 0.5 % ophthalmic solution 1 Drop  1 Drop Both Eyes DAILY    sodium chloride (NS) flush 5-10 mL  5-10 mL IntraVENous Q8H    sodium chloride (NS) flush 5-10 mL  5-10 mL IntraVENous PRN    acetaminophen (TYLENOL) tablet 650 mg  650 mg Oral Q6H PRN    ondansetron (ZOFRAN) injection 4 mg  4 mg IntraVENous Q6H PRN    heparin (porcine) injection 5,000 Units  5,000 Units SubCUTAneous Q8H    promethazine (PHENERGAN) injection 6.25 mg  6.25 mg IntraMUSCular Q6H PRN    morphine injection 2 mg  2 mg IntraVENous Q3H PRN        Review of Systems  Patient is unable    Objective:     Visit Vitals    /77    Pulse 82    Temp 99.1 °F (37.3 °C)    Resp 16    Ht 5' 4\" (1.626 m)    Wt 45.4 kg (100 lb)    SpO2 97%    Breastfeeding No    BMI 17.16 kg/m2      O2 Device: Room air    Temp (24hrs), Av.3 °F (36.8 °C), Min:96.5 °F (35.8 °C), Max:99.1 °F (37.3 °C)      1901 -  0700  In: 1075 [I.V.:3049]  Out: -    07 - 1900  In: 293.4   Out: 1560     General appearance: Improved today. BM.  Large family at bedside. No complaints. T max 99.2 today   Head: Normocephalic, without obvious abnormality, atraumatic  Eyes: Blind  Neck: supple, symmetrical, trachea midline, no JVD  Lungs: Rhonchi left lower lung fields. Otherwise, clear to auscultation bilaterally, diminished in bases  Heart: regular rate and rhythm, S1, S2 normal, no murmur, click, rub or gallop. Intermittent mild tachycardia  Abdomen: softer today. Passing gas.   Had large amount soft brown stool. Denies abdominal pain. Remains distended, not painful to touch. Bowel sounds present all four quads. Extremities: extremities with poor muscle tone, no cyanosis or edema  Skin: Skin color, texture, turgor thin, pale. No rashes or lesions  Neurologic: Grossly normal, no unilateral deficit.      Data Review  Recent Results (from the past 24 hour(s))   OCCULT BLOOD, STOOL    Collection Time: 01/29/17  8:00 AM   Result Value Ref Range    Occult blood, stool POSITIVE (A) NEG     VANCOMYCIN, RANDOM    Collection Time: 01/29/17  8:57 AM   Result Value Ref Range    VANCOMYCIN,RANDOM 10.4 UG/ML        Ref. Range 1/29/2017 04:56   WBC Latest Ref Range: 4.3 - 11.1 K/uL 9.4   RBC Latest Ref Range: 4.05 - 5.25 M/uL 3.27 (L)   HGB Latest Ref Range: 11.7 - 15.4 g/dL 9.7 (L)   HCT Latest Ref Range: 35.8 - 46.3 % 29.3 (L)   MCV Latest Ref Range: 79.6 - 97.8 FL 89.6   MCH Latest Ref Range: 26.1 - 32.9 PG 29.7   MCHC Latest Ref Range: 31.4 - 35.0 g/dL 33.1   RDW Latest Ref Range: 11.9 - 14.6 % 13.1   PLATELET Latest Ref Range: 150 - 450 K/uL 222   MPV Latest Ref Range: 10.8 - 14.1 FL 11.0   NEUTROPHILS Latest Ref Range: 43 - 78 % 68   LYMPHOCYTES Latest Ref Range: 13 - 44 % 19   MONOCYTES Latest Ref Range: 4.0 - 12.0 % 12   EOSINOPHILS Latest Ref Range: 0.5 - 7.8 % 1   BASOPHILS Latest Ref Range: 0.0 - 2.0 % 0   IMMATURE GRANULOCYTES Latest Ref Range: 0.0 - 5.0 % 0.5   DF Latest Units:   AUTOMATED   ABS. NEUTROPHILS Latest Ref Range: 1.7 - 8.2 K/UL 6.4   ABS. IMM. GRANS. Latest Ref Range: 0.0 - 0.5 K/UL 0.1   ABS. LYMPHOCYTES Latest Ref Range: 0.5 - 4.6 K/UL 1.8   ABS. MONOCYTES Latest Ref Range: 0.1 - 1.3 K/UL 1.1   ABS. EOSINOPHILS Latest Ref Range: 0.0 - 0.8 K/UL 0.1   ABS.  BASOPHILS Latest Ref Range: 0.0 - 0.2 K/UL 0.0   Sodium Latest Ref Range: 136 - 145 mmol/L 150 (H)   Potassium Latest Ref Range: 3.5 - 5.1 mmol/L 3.4 (L)   Chloride Latest Ref Range: 98 - 107 mmol/L 114 (H)   CO2 Latest Ref Range: 21 - 32 mmol/L 24   Anion gap Latest Ref Range: 7 - 16 mmol/L 12   Glucose Latest Ref Range: 65 - 100 mg/dL 81   BUN Latest Ref Range: 8 - 23 MG/DL 30 (H)   Creatinine Latest Ref Range: 0.6 - 1.0 MG/DL 1.75 (H)   Calcium Latest Ref Range: 8.3 - 10.4 MG/DL 8.6   Magnesium Latest Ref Range: 1.8 - 2.4 mg/dL 2.1   GFR est non-AA Latest Ref Range: >60 ml/min/1.73m2 29 (L)   GFR est AA Latest Ref Range: >60 ml/min/1.73m2 35 (L)   Bilirubin, total Latest Ref Range: 0.2 - 1.1 MG/DL 0.9   Protein, total Latest Ref Range: 6.3 - 8.2 g/dL 6.8   Albumin Latest Ref Range: 3.2 - 4.6 g/dL 2.5 (L)   Globulin Latest Ref Range: 2.3 - 3.5 g/dL 4.3 (H)   A-G Ratio Latest Ref Range: 1.2 - 3.5   0.6 (L)   ALT Latest Ref Range: 12 - 65 U/L 33   AST Latest Ref Range: 15 - 37 U/L 32   Alk. phosphatase Latest Ref Range: 50 - 136 U/L 54     1/29/17:  KUB:  Prominent loops of bowel are again seen, similar to findings seen previously. An NG tube is present within the stomach. There is dense left basilar consolidation. IMPRESSION: Persistent dilated loops of bowel. The findings could represent an  ileus. 1/28/17: KUB: There is mild gaseous distention of both large and small bowel. No significant change since the previous study 1/25/2017. Nasogastric tube extends to the gastric antrum. No free air seen on the supine view. IMPRESSION: No significant change     1/27/17: CXR: Small left pleural effusion with irregular density at the left lung base most in keeping with atelectasis. Early pneumonia is not excluded     1/25/17: Acute abdomen: Ileus with diffuse gaseous distention of the large and small bowel. Enteric contrast is present in the right hemicolon.     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

## 2017-01-30 NOTE — PROGRESS NOTES
Hospitalist Progress Note    2017  Admit Date: 2017  9:56 PM   NAME: Rosibel Recinos   :  3/13/1924   MRN:  [id number]   Attending: Sanjiv Grajeda MD  PCP:  Lala Gomez MD    SUBJECTIVE:   Patient is a 81 yo AAF with a history of SBO with bowel resection, CKD III, hypertension who presented to the ED with a complaint of n/v/d, abd pain. Found to have ileus and admitted. NGT placed, surgery consulted. Developed low grade fever and started on Vanc & zosyn 17 for suspected HCAP. Also Guiac + with anemia and transfused for a hgb 7.5 from 11.1 on admission. 2017: No Events over night. No new complaints. Patient is blind. NGT removed yesterday. Denies n/v. +flatus. Daughter at bedside. denies pain. No sob. Review of Systems negative with exception of pertinent positives noted above  PHYSICAL EXAM     Visit Vitals    /88 (BP 1 Location: Right arm, BP Patient Position: At rest)    Pulse 72    Temp 98.6 °F (37 °C)    Resp 16    Ht 5' 4\" (1.626 m)    Wt 45.4 kg (100 lb)    SpO2 98%    Breastfeeding No    BMI 17.16 kg/m2      Temp (24hrs), Av.2 °F (36.8 °C), Min:96.5 °F (35.8 °C), Max:99.7 °F (37.6 °C)    Oxygen Therapy  O2 Sat (%): 98 % (17 1155)  Pulse via Oximetry: 70 beats per minute (17 0255)  O2 Device: Room air (17 0618)    Intake/Output Summary (Last 24 hours) at 17 1324  Last data filed at 17 0625   Gross per 24 hour   Intake             3049 ml   Output               10 ml   Net             3039 ml      General: Alert and oriented, No acute distress. Eye: blind. HENT: Normocephalic, atraumatic, Normal hearing, dry mucous membranes. Neck: Supple, Non-tender. Respiratory: diminished at bases. Respirations are non-labored. Cardiovascular: Normal rate, Regular rhythm, No murmur. Gastrointestinal: Soft, Non-tender, mildly distended, positive bowel sounds   Musculoskeletal: No cyanosis, clubbing or edema.    Integumentary: Warm, Dry, Pink, no rash. Neurologic: Alert, Oriented, No focal deficits. Cognition and Speech: Oriented, Speech clear and coherent. Psychiatric: Cooperative, Appropriate mood & affect. ASSESSMENT      Ileus, h/o SBO.   - CT showed ileus,no obstruction.  - NGT removed 1/29  - slowly advance diet  - probiotics  - Surgery consulted and signed off. HTN Urgency, uncontrolled  -  Continue home antihypertensives. -  Monitor and trend BP.   -  Add norvasc  - cont prn Hydralazine. Acute kidney injury on CKD  - Hold nephrotoxic medications. - Follow BUN and Cr.   - IVF. Hypernatremia  - follow na levels  - continue ivfs    Hypokalemia   - replace K with caution  - follow k levels    Suspected HCAP left lung as source of fever  - Vanc, zosyn added 1/28  - repeat cxr may have been atelectasis and not pneumonia. De-escalate antibiotics depending on results. GI Bleed  - resolving  - Guiac + with anemia and transfused for a hgb 7.5 from 11.1 on admission.  - hgb now stable at 9.8  - consider GI consult      DVT prophylaxis: heparin  Full Code - has h/o dnr on chart before. Discussed with Dr. Shelby Garcia: to home SNF STR when stable. Total Time spent: 35 minutes. Counseling & coordinating care dominated the encounter >55%. Counseled patient and family regarding dx, rx, tx. Reviewed prior records, labs, vitals, diagnostic tests, flow sheet, home medications, inpatient medications, nursing and provider notes.     Luisito Leung PA-C, MPAS

## 2017-01-30 NOTE — PROGRESS NOTES
Patient vomited x1 of red tint emesis. Looked on dinner tray and patient had red jello. Family member just left.

## 2017-01-31 NOTE — PROGRESS NOTES
Received patient awake, alert and oriented resting in bed. Patient assisted to bedside commode to urinate. Bowel sounds present, denies pain or nausea at this time. Nursing assessment completed. Bed in low and locked position. IV fluids infusing as ordered. Patient instructed to call for assistance, if needed.

## 2017-01-31 NOTE — PROGRESS NOTES
Pt resting in bed. Shift assessment complete. Bed low to ground, wheels locked, and side rails up x 3. Call light within reach. Pt c/o cramping in abd but no nausea at this time. Pt was able to drink clear ensure drink, and swallow pills whole with tea. Pt has active bowel sounds x 4 quadrants. Pt's last BM was this am. Pt's daughter at bedside, no needs at this time.

## 2017-01-31 NOTE — PROGRESS NOTES
Pharmacokinetic Consult to Pharmacist    Katelyn Bettencourt is a 80 y.o. female being treated for HCAP with Vancomycin. @PATRICIA(22)@  @GLLX(35)@  Lab Results   Component Value Date/Time    BUN 19 01/31/2017 08:06 AM    Creatinine 1.60 01/31/2017 08:06 AM    WBC 7.4 01/31/2017 08:06 AM    Procalcitonin 1.0 01/27/2017 12:57 PM      Estimated Creatinine Clearance: 16.1 mL/min (based on Cr of 1.6). CULTURES:  All Micro Results       Procedure Component Value Units Date/Time      CULTURE, BLOOD [[id number]] Collected:  01/27/17 1600    Order Status:  Completed Specimen:  Blood from Blood Updated:  01/31/17 0832     Special Requests: LEFT ANTECUBITAL        Culture result: NO GROWTH 4 DAYS       CULTURE, BLOOD [[id number]] Collected:  01/27/17 1605    Order Status:  Completed Specimen:  Blood from Blood Updated:  01/31/17 0832     Special Requests: LEFT ARM        Culture result: NO GROWTH 4 DAYS                 Lab Results   Component Value Date/Time    VANCOMYCIN,RANDOM 22.9 01/31/2017 11:37 AM       Day 4 of vancomycin. Goal trough is 15 -20. Vancomycin 1 gram IV once today at 1800. Will continue to follow patient.       Thank you,  Joyce ShinD, BCPS

## 2017-01-31 NOTE — PROGRESS NOTES
Hospitalist Progress Note    2017  Admit Date: 2017  9:56 PM   NAME: Linda Chavez   :  3/13/1924   MRN:  [id number]   Attending: Shanita Cooper MD  PCP:  Hema Maharaj MD  Treatment Team: Attending Provider: Shanita Cooper MD; Utilization Review: Naveen Wiley    Full Code   SUBJECTIVE:   Ms. Amilcar Wood is a 81 yo female who presented with c/o n/v/d, abd pain. Was found to have ileus without obstruction on CT abd. NGT placed, removed 16. Surgery consulted, signed off, no surgical intervention needed. Today pt abd more distended, c/o increased pain however able to tolerate PO intake without n/v, has diarrhea. Pt became febrile and started on Vanc and Zosyn for presumably for HCAP, fever has since resolved. Pt found to be guiac + with drop in hgb from 11.1 on admission to 7.5. Pt without leukocytosis, creatinine at baseline, was hypernatremic, IVF changed to 0.45% NS, Na 142 today. Denies CP, SOB, n/v.       Past Medical History   Diagnosis Date    Blindness - both eyes     CKD (chronic kidney disease) stage 3, GFR 30-59 ml/min      baseline creatinine 1.8    Hypertension     Other ill-defined conditions(799.89)      shingles     Recent Results (from the past 24 hour(s))   METABOLIC PANEL, COMPREHENSIVE    Collection Time: 17  8:06 AM   Result Value Ref Range    Sodium 142 136 - 145 mmol/L    Potassium 3.2 (L) 3.5 - 5.1 mmol/L    Chloride 108 (H) 98 - 107 mmol/L    CO2 19 (L) 21 - 32 mmol/L    Anion gap 15 7 - 16 mmol/L    Glucose 98 65 - 100 mg/dL    BUN 19 8 - 23 MG/DL    Creatinine 1.60 (H) 0.6 - 1.0 MG/DL    GFR est AA 39 (L) >60 ml/min/1.73m2    GFR est non-AA 32 (L) >60 ml/min/1.73m2    Calcium 8.0 (L) 8.3 - 10.4 MG/DL    Bilirubin, total 0.6 0.2 - 1.1 MG/DL     (H) 12 - 65 U/L     (H) 15 - 37 U/L    Alk.  phosphatase 77 50 - 136 U/L    Protein, total 6.6 6.3 - 8.2 g/dL    Albumin 2.2 (L) 3.2 - 4.6 g/dL    Globulin 4.4 (H) 2.3 - 3.5 g/dL    A-G Ratio 0.5 (L) 1.2 - 3.5     CBC WITH AUTOMATED DIFF    Collection Time: 01/31/17  8:06 AM   Result Value Ref Range    WBC 7.4 4.3 - 11.1 K/uL    RBC 3.64 (L) 4.05 - 5.25 M/uL    HGB 10.8 (L) 11.7 - 15.4 g/dL    HCT 32.6 (L) 35.8 - 46.3 %    MCV 89.6 79.6 - 97.8 FL    MCH 29.7 26.1 - 32.9 PG    MCHC 33.1 31.4 - 35.0 g/dL    RDW 13.8 11.9 - 14.6 %    PLATELET 441 610 - 850 K/uL    MPV 10.5 (L) 10.8 - 14.1 FL    DF AUTOMATED      NEUTROPHILS 74 43 - 78 %    LYMPHOCYTES 13 13 - 44 %    MONOCYTES 10 4.0 - 12.0 %    EOSINOPHILS 3 0.5 - 7.8 %    BASOPHILS 0 0.0 - 2.0 %    IMMATURE GRANULOCYTES 0.5 0.0 - 5.0 %    ABS. NEUTROPHILS 5.4 1.7 - 8.2 K/UL    ABS. LYMPHOCYTES 1.0 0.5 - 4.6 K/UL    ABS. MONOCYTES 0.7 0.1 - 1.3 K/UL    ABS. EOSINOPHILS 0.2 0.0 - 0.8 K/UL    ABS. BASOPHILS 0.0 0.0 - 0.2 K/UL    ABS. IMM.  GRANS. 0.0 0.0 - 0.5 K/UL     No Known Allergies  Current Facility-Administered Medications   Medication Dose Route Frequency Provider Last Rate Last Dose    potassium chloride 20 mEq in 100 ml IVPB  20 mEq IntraVENous Q2H Celine See NP        potassium chloride (KAON 10%) 20 mEq/15 mL oral liquid 40 mEq  40 mEq Oral DAILY Celine See NP        Lactobacillus Acidoph & Bulgar [facility]) tablet 2 Tab  2 Tab Oral BID Norma Benavides NP   2 Tab at 01/30/17 1807    amLODIPine (NORVASC) tablet 5 mg  5 mg Oral DAILY KEVIN West   5 mg at 01/31/17 0853    vancomycin (VANCOCIN) 1,000 mg in 0.9% sodium chloride (MBP/ADV) 250 mL  1,000 mg IntraVENous See Admin Instructions Norma Benavides NP        0.45% sodium chloride infusion  100 mL/hr IntraVENous CONTINUOUS Norma Kennedy,  mL/hr at 01/31/17 0116 100 mL/hr at 01/31/17 0116    piperacillin-tazobactam (ZOSYN) 4.5 g in 0.9% sodium chloride (MBP/ADV) 100 mL  4.5 g IntraVENous Q12H Gisella Danielle NP 25 mL/hr at 01/31/17 0851 4.5 g at 01/31/17 0851    0.9% sodium chloride infusion 250 mL  250 mL IntraVENous PRN Norma Benavides NP  diphenhydrAMINE (BENADRYL) injection 12.5 mg  12.5 mg IntraVENous Q4H PRN Brianne Hutson NP   12.5 mg at 01/28/17 1449    hydrALAZINE (APRESOLINE) 20 mg/mL injection 10 mg  10 mg IntraVENous Q4H PRN Ashia Lynn MD   10 mg at 01/30/17 1243    acetaminophen (TYLENOL) suppository 650 mg  650 mg Rectal Q4H PRN Brianne Hutson NP   650 mg at 01/26/17 1250    aspirin delayed-release tablet 81 mg  81 mg Oral DAILY Avril Augusto, DO   81 mg at 01/31/17 0853    latanoprost (XALATAN) 0.005 % ophthalmic solution 1 Drop  1 Drop Both Eyes QHS Avril Augusto, DO   1 Drop at 01/30/17 2249    metoprolol tartrate (LOPRESSOR) tablet 50 mg  50 mg Oral BID Avril Augusto, DO   50 mg at 01/31/17 0853    timolol (TIMOPTIC) 0.5 % ophthalmic solution 1 Drop  1 Drop Both Eyes DAILY Avril Augusto, DO   1 Drop at 01/31/17 0857    sodium chloride (NS) flush 5-10 mL  5-10 mL IntraVENous Q8H Avril Augusto, DO   10 mL at 01/27/17 5829    sodium chloride (NS) flush 5-10 mL  5-10 mL IntraVENous PRN Avril Augusto, DO   5 mL at 01/29/17 1151    acetaminophen (TYLENOL) tablet 650 mg  650 mg Oral Q6H PRN Avril Augusto, DO   650 mg at 01/29/17 3545    ondansetron (ZOFRAN) injection 4 mg  4 mg IntraVENous Q6H PRN Avril Augusto, DO   4 mg at 01/25/17 2220    heparin (porcine) injection 5,000 Units  5,000 Units SubCUTAneous Q8H Avril Augusto, DO   5,000 Units at 01/31/17 0631    promethazine (PHENERGAN) injection 6.25 mg  6.25 mg IntraMUSCular Q6H PRN Matthew Montejo NP   6.25 mg at 01/25/17 1726    morphine injection 2 mg  2 mg IntraVENous Q3H PRN Avril Augusto, DO   2 mg at 01/27/17 0114       Review of Systems negative with exception of pertinent positives noted above  PHYSICAL EXAM     Visit Vitals    /75    Pulse 78    Temp 98.8 °F (37.1 °C)    Resp 18    Ht 5' 4\" (1.626 m)    Wt 45.4 kg (100 lb)    SpO2 97%    Breastfeeding No    BMI 17.16 kg/m2 Temp (24hrs), Av.3 °F (36.8 °C), Min:97.8 °F (36.6 °C), Max:98.8 °F (37.1 °C)    Oxygen Therapy  O2 Sat (%): 97 % (17 0758)  Pulse via Oximetry: 70 beats per minute (17 0255)  O2 Device: Room air (17 0618)    Intake/Output Summary (Last 24 hours) at 17 1052  Last data filed at 17 0758   Gross per 24 hour   Intake              480 ml   Output                0 ml   Net              480 ml      General: No acute distress    Lungs: Diminished all lobes, coarse left lower lobe  Heart:  S1S2 present without murmurs rubs gallops. RRR.  No edema  Abdomen: Distended, mild tenderness, BS hypoactive  Extremities: Moves ext well  Neurologic:  No focal deficits  Psych: A/O X3    Results summary of Diagnostic Studies/Procedures copied from within [facility] EMR:         T[address] Problems    Diagnosis Date Noted    GLADYS (acute kidney injury) ([facility] [place] [address] 2017    Diarrhea 2017    Nausea 2017    Hyperkalemia 2015    Chronic kidney disease, stage III (moderate) 2013     Plan:    Ileus   CT showing ileus, no obstruction   NGT removed , has tolerated diet without n/v however increase in distention, BS hypoactive, increase in abd pain   Obtain KUB    HTN: chronic   More controlled   Continue current regimen   Continue prn hydralazine    GLADYS:   Creatinine back to baseline   BMP in AM   Continue IVF    Hypernatremia:   Resolved   Continue 0.45% NS   BMP in AM    Hypokalemia:    K 3.2 today, replace with PO and IV KCL   BMP in AM    HCAP:   CXR 17 showing Increasing bibasilar infiltrate, left greater than right   Continue Vanc and Zosyn      Time spent with pt 30 min  Notes, labs, VS, diagnostic testing reviewed    DVT Prophylaxis: heparin sq  Plan of Care Discussed with: Supervising MD Dr. Eneida Foote, care team, pt, daughter at [address], NP

## 2017-02-01 NOTE — PROGRESS NOTES
Pt up in bed with daughter at bedside. Pt tolerating clear liquid dinner well with out any n/v. No further BMS reported since this am.  VSS. Assessment unchanged. inst pt/daughter to call for any needs.

## 2017-02-01 NOTE — PROGRESS NOTES
Pt placed on Enteric precations and all staff aware with sign posted on door. Teaching sheet given to pt daughter at [facility]. Pt has had 3 loose stools this am.  No further stools reported. Pt turned and repositioned every 2 hrs and alleyven dressing remains intact and clean to pt sacral area. No changes in pt assessment. inst pt /daughter to call for any needs.

## 2017-02-01 NOTE — PROGRESS NOTES
Pharmacokinetic Consult to Pharmacist    Rukhsana Valdes is a 80 y.o. female being treated for HCAP with Vancomycin and Zosyn. Height: 5' 4\" (162.6 cm)  Weight: 45.4 kg (100 lb)  Lab Results   Component Value Date/Time    BUN 15 02/01/2017 05:40 AM    Creatinine 1.35 02/01/2017 05:40 AM    WBC 9.2 02/01/2017 05:40 AM    Procalcitonin 1.0 01/27/2017 12:57 PM      Estimated Creatinine Clearance: 19.1 mL/min (based on Cr of 1.35). CULTURES:  All Micro Results     Procedure Component Value Units Date/Time    C. DIFFICILE/EPI PCR [[id number]]     Order Status:  Sent Specimen:  Stool     CULTURE, BLOOD [[id number]] Collected:  01/27/17 1600    Order Status:  Completed Specimen:  Blood from Blood Updated:  02/01/17 0602     Special Requests: LEFT ANTECUBITAL        Culture result: NO GROWTH 5 DAYS       CULTURE, BLOOD [[id number]] Collected:  01/27/17 1605    Order Status:  Completed Specimen:  Blood from Blood Updated:  02/01/17 0602     Special Requests: LEFT ARM        Culture result: NO GROWTH 5 DAYS               Lab Results   Component Value Date/Time    Vancomycin,trough 20.1 01/31/2017 04:54 PM    VANCOMYCIN,RANDOM 21.4 02/01/2017 03:56 PM       Day 5 of vancomycin. Goal trough is 15 - 20. Will hold Vancomycin today, next dose tomorrow 6 am - Vancomycin 1 gram IV. Will continue to follow patient.       Thank you,  Camelia ShinD, BCPS

## 2017-02-01 NOTE — PROGRESS NOTES
Hospitalist Progress Note    2017  Admit Date: 2017  9:56 PM   NAME: Linda Chavez   :  3/13/1924   MRN:  [id number]   Attending: Shanita Cooper MD  PCP:  Hema Maharaj MD  Treatment Team: Attending Provider: Shanita Cooper MD; Utilization Review: Naveen Wiley    Full Code   SUBJECTIVE:   Ms. Amilcar Wood is a 81 yo female who presented with c/o n/v/d, abd pain. Was found to have ileus without obstruction on CT abd. NGT placed, removed 16. Surgery consulted, signed off, no surgical intervention needed. yesterday pt abd more distended, c/o increased pain however able to tolerate PO intake without n/v, has diarrhea. KUB  showing stable gaseous distention without free air. Pt became febrile and started on Vanc and Zosyn for presumably for HCAP, fever has since resolved. Pt found to be guiac + with drop in hgb from 11.1 on admission to 7.5, received 2 units PRBC. Hgb today 9.6. Pt without leukocytosis, creatinine at baseline, was hypernatremic, IVF changed to 0.45% NS, Na 137 today.  Denies CP, SOB, n/v. Still c/o diarrhea, reports abd pain much improved.          Past Medical History   Diagnosis Date    Blindness - both eyes     CKD (chronic kidney disease) stage 3, GFR 30-59 ml/min      baseline creatinine 1.8    Hypertension     Other ill-defined conditions(799.89)      shingles     Recent Results (from the past 24 hour(s))   VANCOMYCIN, TROUGH    Collection Time: 17  4:54 PM   Result Value Ref Range    Vancomycin,trough 20.1 (HH) 5 - 20 ug/mL   CBC WITH AUTOMATED DIFF    Collection Time: 17  5:40 AM   Result Value Ref Range    WBC 9.2 4.3 - 11.1 K/uL    RBC 3.25 (L) 4.05 - 5.25 M/uL    HGB 9.6 (L) 11.7 - 15.4 g/dL    HCT 29.0 (L) 35.8 - 46.3 %    MCV 89.2 79.6 - 97.8 FL    MCH 29.5 26.1 - 32.9 PG    MCHC 33.1 31.4 - 35.0 g/dL    RDW 13.8 11.9 - 14.6 %    PLATELET 889 664 - 655 K/uL    MPV 11.7 10.8 - 14.1 FL    NEUTROPHILS 70 47 - 75 %    LYMPHOCYTES 16 16 - 44 % MONOCYTES 10 (H) 3 - 9 %    EOSINOPHILS 4 1 - 8 %    ABS. NEUTROPHILS 6.4 1.7 - 8.2 K/UL    ABS. LYMPHOCYTES 1.5 0.5 - 4.6 K/UL    ABS. MONOCYTES 0.9 0.1 - 1.3 K/UL    ABS.  EOSINOPHILS 0.4 0.0 - 0.8 K/UL    RBC COMMENTS MODERATE  POIKILOCYTOSIS        PLATELET COMMENTS ADEQUATE      DF MANUAL     METABOLIC PANEL, BASIC    Collection Time: 02/01/17  5:40 AM   Result Value Ref Range    Sodium 137 136 - 145 mmol/L    Potassium 4.2 3.5 - 5.1 mmol/L    Chloride 109 (H) 98 - 107 mmol/L    CO2 22 21 - 32 mmol/L    Anion gap 6 (L) 7 - 16 mmol/L    Glucose 97 65 - 100 mg/dL    BUN 15 8 - 23 MG/DL    Creatinine 1.35 (H) 0.6 - 1.0 MG/DL    GFR est AA 47 (L) >60 ml/min/1.73m2    GFR est non-AA 39 (L) >60 ml/min/1.73m2    Calcium 7.6 (L) 8.3 - 10.4 MG/DL     No Known Allergies  Current Facility-Administered Medications   Medication Dose Route Frequency Provider Last Rate Last Dose    potassium chloride (KAON 10%) 20 mEq/15 mL oral liquid 40 mEq  40 mEq Oral DAILY Eloina Huertas NP   40 mEq at 02/01/17 1013    Lactobacillus Acidoph & Bulgar [facility]) tablet 2 Tab  2 Tab Oral BID Gale Robbins NP   2 Tab at 02/01/17 1021    amLODIPine (NORVASC) tablet 5 mg  5 mg Oral DAILY KEVIN Hdz   5 mg at 02/01/17 1022    vancomycin (VANCOCIN) 1,000 mg in 0.9% sodium chloride (MBP/ADV) 250 mL  1,000 mg IntraVENous See Admin Instructions Gale Robbins NP        0.45% sodium chloride infusion  100 mL/hr IntraVENous CONTINUOUS Gale Robbins  mL/hr at 01/31/17 0116 100 mL/hr at 01/31/17 0116    piperacillin-tazobactam (ZOSYN) 4.5 g in 0.9% sodium chloride (MBP/ADV) 100 mL  4.5 g IntraVENous Q12H Gisella Danielle NP 25 mL/hr at 02/01/17 1014 4.5 g at 02/01/17 1014    0.9% sodium chloride infusion 250 mL  250 mL IntraVENous PRN Gale Robbins NP        diphenhydrAMINE (BENADRYL) injection 12.5 mg  12.5 mg IntraVENous Q4H PRN Gale Robbins NP   12.5 mg at 01/28/17 1449    hydrALAZINE (APRESOLINE) 20 mg/mL injection 10 mg  10 mg IntraVENous Q4H PRN Kathryn Espinoza MD   10 mg at 17 1243    acetaminophen (TYLENOL) suppository 650 mg  650 mg Rectal Q4H PRN Rafael Lockwood NP   650 mg at 17 1250    aspirin delayed-release tablet 81 mg  81 mg Oral DAILY [facility], DO   81 mg at 17 1022    latanoprost (XALATAN) 0.005 % ophthalmic solution 1 Drop  1 Drop Both Eyes QHS [facility], DO   1 Drop at 17 2100    metoprolol tartrate (LOPRESSOR) tablet 50 mg  50 mg Oral BID [facility], DO   50 mg at 17 1022    timolol (TIMOPTIC) 0.5 % ophthalmic solution 1 Drop  1 Drop Both Eyes DAILY [facility], DO   1 Drop at 17 0900    sodium chloride (NS) flush 5-10 mL  5-10 mL IntraVENous Q8H [facility], DO   10 mL at 17 7107    sodium chloride (NS) flush 5-10 mL  5-10 mL IntraVENous PRN [facility], DO   5 mL at 17 1151    acetaminophen (TYLENOL) tablet 650 mg  650 mg Oral Q6H PRN [facility], DO   650 mg at 17 2060    ondansetron (ZOFRAN) injection 4 mg  4 mg IntraVENous Q6H PRN [facility], DO   4 mg at 17 2220    heparin (porcine) injection 5,000 Units  5,000 Units SubCUTAneous Q8H [facility], DO   5,000 Units at 17 5890    promethazine (PHENERGAN) injection 6.25 mg  6.25 mg IntraMUSCular Q6H PRN Gisela Leggett NP   6.25 mg at 17 1726    morphine injection 2 mg  2 mg IntraVENous Q3H PRN [facility], DO   2 mg at 17 0114       Review of Systems negative with exception of pertinent positives noted above  PHYSICAL EXAM     Visit Vitals    /90 (BP 1 Location: Left arm, BP Patient Position: At rest)    Pulse 84    Temp 98.6 °F (37 °C)    Resp 16    Ht 5' 4\" (1.626 m)    Wt 45.4 kg (100 lb)    SpO2 95%    Breastfeeding No    BMI 17.16 kg/m2      Temp (24hrs), Av.9 °F (37.2 °C), Min:98.5 °F (36.9 °C), Max:99.4 °F (37.4 °C)    Oxygen Therapy  O2 Sat (%): 95 % (02/01/17 0803)  Pulse via Oximetry: 70 beats per minute (01/25/17 0255)  O2 Device: Room air (01/28/17 0618)    Intake/Output Summary (Last 24 hours) at 02/01/17 1152  Last data filed at 02/01/17 0826   Gross per 24 hour   Intake             1263 ml   Output                1 ml   Net             1262 ml      General: No acute distress    Lungs: Diminished all lobes, poor resp effort  Heart:  S1S2 present without murmurs rubs gallops. RRR. No edema  Abdomen: Distended, no tenderness, BS present  Extremities: Moves ext well  Neurologic:  No focal deficits  Psych: A/O X3    Results summary of Diagnostic Studies/Procedures copied from within [facility] EMR:     Abdominal radiograph dated 1/31/2017     History: Abdominal distention.     Comparison: KUB 1/29/2017.     Findings:   Flat and upright views of the abdomen are submitted. Gaseous distention of small  and large bowel are once again seen which is not felt to be significantly  changed. Persistent colonic gas is felt to be present. No evolving free  intraperitoneal air is seen on the seated upright view.  Left basilar pulmonary  opacity is seen which is not felt to be significantly changed although  suboptimally assessed on this dedicated study of the abdomen.     IMPRESSION  IMPRESSION:   1. Stable gaseous distention of bowel without evolving free air.       ASSESSMENT      Active Hospital Problems    Diagnosis Date Noted    [facility]) [facility] [address] 01/25/2017    Diarrhea 01/25/2017    Nausea 01/25/2017    Hyperkalemia 08/08/2015    Chronic kidney disease, stage III (moderate) 05/03/2013     Plan:    Ileus  CT showing ileus, no obstruction  NGT removed 1/29, has tolerated diet without n/v however increase in distention, BS hypoactive, increase in abd pain  KUB with stable gaseous distention 1/31/17     HTN: chronic  elevated  Continue current regimen  Continue prn hydralazine     GLADYS:  Creatinine back to baseline  BMP in AM  Continue IVF     Hypernatremia:  Resolved  Continue 0.45% NS  BMP in AM     Hypokalemia:   K 3.2 today, replace with PO and IV KCL  BMP in AM     HCAP:  CXR 1/30/17 showing Increasing bibasilar infiltrate, left greater than right  Continue Vanc and Zosyn Day 5    Diarrhea:   Check cdiff        Time spent with pt 30 min  Notes, labs, VS, diagnostic testing reviewed       DVT Prophylaxis: heparin sq  Plan of Care Discussed with: Supervising MD Dr. Duarte Salcedo, care team, pt, daughter at [facility], NP

## 2017-02-02 NOTE — PROGRESS NOTES
Hospitalist Progress Note    2017  Admit Date: 2017  9:56 PM   NAME: Elvi Jose   :  3/13/1924   MRN:  [id number]   Attending: Cassandra Christensen MD  PCP:  Pako Steele MD  Treatment Team: Attending Provider: Cassandra Christensen MD; Utilization Review: Juan Luis Humphrey    Full Code   SUBJECTIVE:   Ms. Cindi Coelho is a 81 yo female who presented with c/o n/v/d, abd pain. Was found to have ileus without obstruction on CT abd. NGT placed, removed 16. Surgery consulted, signed off, no surgical intervention needed. Pt abd more distended, c/o increased pain however able to tolerate PO intake without n/v, has diarrhea. KUB  showing stable gaseous distention without free air. Pt became febrile and started on Vanc and Zosyn for presumably for HCAP, fever has since resolved. Pt found to be guiac + with drop in hgb from 11.1 on admission to 7.5, received 2 units PRBC. Hgb stable. Pt without leukocytosis, creatinine at baseline, was hypernatremic, IVF changed to 0.45% NS, Na 141 today. Denies CP, SOB, n/v. Awaiting cdiff results. Has been tolerating clear liquid diet without n/v/abd pain. Still c/o diarrhea, reports abd pain/distention significantly improved.       Past Medical History   Diagnosis Date    Blindness - both eyes     CKD (chronic kidney disease) stage 3, GFR 30-59 ml/min      baseline creatinine 1.8    Hypertension     Other ill-defined conditions(799.89)      shingles     Recent Results (from the past 24 hour(s))   VANCOMYCIN, RANDOM    Collection Time: 17  3:56 PM   Result Value Ref Range    VANCOMYCIN,RANDOM 27.5 UG/ML   METABOLIC PANEL, BASIC    Collection Time: 17  4:52 AM   Result Value Ref Range    Sodium 141 136 - 145 mmol/L    Potassium 4.3 3.5 - 5.1 mmol/L    Chloride 112 (H) 98 - 107 mmol/L    CO2 21 21 - 32 mmol/L    Anion gap 8 7 - 16 mmol/L    Glucose 96 65 - 100 mg/dL    BUN 13 8 - 23 MG/DL    Creatinine 1.30 (H) 0.6 - 1.0 MG/DL    GFR est AA 49 (L) >60 ml/min/1.73m2    GFR est non-AA 41 (L) >60 ml/min/1.73m2    Calcium 7.3 (L) 8.3 - 10.4 MG/DL     No Known Allergies  Current Facility-Administered Medications   Medication Dose Route Frequency Provider Last Rate Last Dose    vancomycin (VANCOCIN) 1,000 mg in 0.9% sodium chloride (MBP/ADV) 250 mL  1,000 mg IntraVENous ONCE Dean Suarez MD        potassium chloride (KAON 10%) 20 mEq/15 mL oral liquid 40 mEq  40 mEq Oral DAILY Hardie Meigs, NP   40 mEq at 02/01/17 1013    Lactobacillus Acidoph & [facility]) tablet 2 Tab  2 Tab Oral BID Emery Virgen NP   2 Tab at 02/01/17 1800    amLODIPine (NORVASC) tablet 5 mg  5 mg Oral DAILY Floy Rushing, PA   5 mg at 02/01/17 1022    vancomycin (VANCOCIN) 1,000 mg in 0.9% sodium chloride (MBP/ADV) 250 mL  1,000 mg IntraVENous See Admin Instructions Emery Virgen NP        0.45% sodium chloride infusion  100 mL/hr IntraVENous CONTINUOUS Emery Virgen  mL/hr at 01/31/17 0116 100 mL/hr at 01/31/17 0116    piperacillin-tazobactam (ZOSYN) 4.5 g in 0.9% sodium chloride (MBP/ADV) 100 mL  4.5 g IntraVENous Q12H Gisella Danielle NP 25 mL/hr at 02/01/17 2150 4.5 g at 02/01/17 2150    0.9% sodium chloride infusion 250 mL  250 mL IntraVENous PRN Emery Virgen NP        diphenhydrAMINE (BENADRYL) injection 12.5 mg  12.5 mg IntraVENous Q4H PRN Emery Virgen NP   12.5 mg at 01/28/17 1449    hydrALAZINE (APRESOLINE) 20 mg/mL injection 10 mg  10 mg IntraVENous Q4H PRN Eloise Hennessy MD   10 mg at 01/30/17 1243    acetaminophen (TYLENOL) suppository 650 mg  650 mg Rectal Q4H PRN Emery Virgen NP   650 mg at 01/26/17 1250    aspirin delayed-release tablet 81 mg  81 mg Oral DAILY Alee Toscano DO   81 mg at 02/01/17 1022    latanoprost (XALATAN) 0.005 % ophthalmic solution 1 Drop  1 Drop Both Eyes QHS Alee Toscano DO   1 Drop at 02/01/17 1070    metoprolol tartrate (LOPRESSOR) tablet 50 mg  50 mg Oral BID Jd Greer D Melody, DO   50 mg at 17 1800    timolol (TIMOPTIC) 0.5 % ophthalmic solution 1 Drop  1 Drop Both Eyes DAILY Wendy Sale, DO   1 Drop at 17 0900    sodium chloride (NS) flush 5-10 mL  5-10 mL IntraVENous Q8H Wendy Sale, DO   10 mL at 17 0862    sodium chloride (NS) flush 5-10 mL  5-10 mL IntraVENous PRN Wendy Sale, DO   5 mL at 17 1151    acetaminophen (TYLENOL) tablet 650 mg  650 mg Oral Q6H PRN Wendy Sale, DO   650 mg at 17 0009    ondansetron (ZOFRAN) injection 4 mg  4 mg IntraVENous Q6H PRN Wendy Sale, DO   4 mg at 17 2220    heparin (porcine) injection 5,000 Units  5,000 Units SubCUTAneous Q8H Wendy Sale, DO   5,000 Units at 17 2250    promethazine (PHENERGAN) injection 6.25 mg  6.25 mg IntraMUSCular Q6H PRN Sydna Sonya, NP   6.25 mg at 17 1726    morphine injection 2 mg  2 mg IntraVENous Q3H PRN Wendy Sale, DO   2 mg at 17 0114       Review of Systems negative with exception of pertinent positives noted above  PHYSICAL EXAM     Visit Vitals    /84 (BP 1 Location: Left arm, BP Patient Position: At rest)    Pulse 79    Temp 97.8 °F (36.6 °C)    Resp 16    Ht 5' 4\" (1.626 m)    Wt 45.4 kg (100 lb)    SpO2 97%    Breastfeeding No    BMI 17.16 kg/m2      Temp (24hrs), Av.4 °F (36.9 °C), Min:97.8 °F (36.6 °C), Max:99.4 °F (37.4 °C)    Oxygen Therapy  O2 Sat (%): 97 % (17 0817)  Pulse via Oximetry: 70 beats per minute (17 0255)  O2 Device: Room air (17 1945)    Intake/Output Summary (Last 24 hours) at 17 0945  Last data filed at 17   Gross per 24 hour   Intake             1529 ml   Output                0 ml   Net             1529 ml    General: No acute distress    Lungs: Diminished all lobes, poor resp effort  Heart:  S1S2 present without murmurs rubs gallops. RRR.  No edema  Abdomen: semi-soft, mild distention, no tenderness, BS present  Extremities: Moves ext well  Neurologic:  No focal deficits  Psych: A/O X3    Results summary of Diagnostic Studies/Procedures copied from within [facility] EMR:         De Com[address] 96 Problems    Diagnosis Date Noted    GLADYS (acute kidney injury) ([facility] [address] 01/25/2017    Diarrhea 01/25/2017    Nausea 01/25/2017    Hyperkalemia 08/08/2015    Chronic kidney disease, stage III (moderate) 05/03/2013     Plan:    Ileus  CT showing ileus, no obstruction  NGT removed 1/29, has tolerated diet without n/v   KUB with stable gaseous distention 1/31/17  Advance to full liquid diet      HTN: chronic  elevated  Increase Norvasc  Continue prn hydralazine      GLADYS:  Creatinine back to baseline  BMP in AM  Continue IVF      Hypernatremia:  Resolved  Continue 0.45% NS  BMP in AM      Hypokalemia:   resolved  BMP in AM      HCAP:  CXR 1/30/17 showing Increasing bibasilar infiltrate, left greater than right  Continue Vanc and Zosyn Day 6, will change to Levaquin today     Diarrhea:  Check cdiff      Time spent with pt 30 min  Notes, labs, VS, diagnostic testing reviewed    DVT Prophylaxis: heparin sq  Plan of Care Discussed with: Supervising MD Dr. Anika Livingston, care team, pt  Percy Zaragoza NP

## 2017-02-02 NOTE — PROGRESS NOTES
Patient is A&Ox4. Able to verbalize needs. Resting quietly with no distress noted. Shift assessment completed. Pt blind in both eyes. Eye drops given in both eyes. Neurovascular and peripheral vascular checks WNL. Voiding clear yellow urine. Wearing incontinent briefs. Multiple loose BMs. PIV infusing. Dressing to PIV intact with no S/S of infection. Denies needs. Bed low and locked. Call light within reach. Instructed to call for assistance. Patient verbalizes understanding. Family members at bedside. Will continue to monitor.

## 2017-02-02 NOTE — PROGRESS NOTES
Uneventful shift. Pt deneis any n/v or further loose stools. VSS. Pt tolerating full liquid diet well so far. Pt daughter at bedside. inst both to call for any needs.

## 2017-02-03 NOTE — PROGRESS NOTES
Problem: Nutrition Deficit  Goal: *Optimize nutritional status  Nutrition Follow Up:  Diet order(s): Full Liquids effective 2/02, Clear Liquids effective 1/30   Pt tolerating diet advancement from clears to full liquids and accepting Ensure Enlive sent on meal trays. Weight of 45.4 kg stated on 1/24. Most recent documented BM on 2/03. Pertinent Meds: Vanc, Floranex; IVF 0.45% NaCl @ 100 mL/hr  Macronutrient needs:  EER: 7625-5793 kcal /day (30-35 kcal/kg BW)  EPR: 27-36 grams protein/day (0.6-0.8 grams/kg BW), GFR 34  Intake/Comparative Standards  1 recorded intake at 50% since diet advanced to full liquids. Daughter states patient ate 100% lunch today . Pt potentially meets 100% estimated kcal and > 100% estimated protein needs. Intervention:  Meals and snacks: Continue current full liquid diet. Advance per MD recommendation. Nutrition Supplementation: Initiated Ensure Enlive at all meals while patient on full liquids. Monitoring / Evaluation:  % intake.   Diamond Dozier, [address], MPH  [phone]

## 2017-02-03 NOTE — PROGRESS NOTES
Hospitalist Progress Note    2/3/2017  Admit Date: 2017  9:56 PM   NAME: Kathy Painting   :  3/13/1924   MRN:  [id number]   Attending: Kat Horowitz MD  PCP:  Lionel Khanna MD  Treatment Team: Attending Provider: Kat Horowitz MD; Utilization Review: Cristina Abdi    Full Code   SUBJECTIVE:   Ms. Prakash Mosquera is a 81 yo female who presented with c/o n/v/d, abd pain. Was found to have ileus without obstruction on CT abd. NGT placed, removed 16. Surgery consulted, signed off, no surgical intervention needed. 17 Pt abd more distended, c/o increased pain however able to tolerate PO intake without n/v, has diarrhea. KUB  showing stable gaseous distention without free air. Pt became febrile and started on Vanc and Zosyn for presumably for HCAP, fever has since resolved and changed to levaquin. Pt found to be guiac + with drop in hgb from 11.1 on admission to 7.5, received 2 units PRBC. Hgb stable. Pt without leukocytosis, creatinine at baseline, was hypernatremic, IVF changed to 0.45% NS, resolved. Denies CP, SOB, n/v. Cdiff negative. Has been tolerating clear liquid diet without n/v/abd pain advanced to full liquids and c/o increased abd pain. Past Medical History   Diagnosis Date    Blindness - both eyes 0    CKD (chronic kidney disease) stage 3, GFR 30-59 ml/min      baseline creatinine 1.8    Hypertension     Other ill-defined conditions(799.89)      shingles     No results found for this or any previous visit (from the past 24 hour(s)).   No Known Allergies  Current Facility-Administered Medications   Medication Dose Route Frequency Provider Last Rate Last Dose    amLODIPine (NORVASC) tablet 10 mg  10 mg Oral DAILY Yeison Cane, NP   10 mg at 17 0829    levoFLOXacin (LEVAQUIN) 750 mg in D5W IVPB  750 mg IntraVENous Q48H Yeison Cane,  mL/hr at 17 1013 750 mg at 17 1013    potassium chloride (KAON 10%) 20 mEq/15 mL oral liquid 40 mEq  40 mEq Oral DAILY Jenny Costa NP   40 mEq at 02/03/17 0830    Lactobacillus Acidoph & Bulgar [facility]) tablet 2 Tab  2 Tab Oral BID Pham Reid NP   2 Tab at 02/03/17 0829    0.45% sodium chloride infusion  100 mL/hr IntraVENous CONTINUOUS Pham Reid  mL/hr at 02/03/17 0825 100 mL/hr at 02/03/17 0825    0.9% sodium chloride infusion 250 mL  250 mL IntraVENous PRN Pham Reid NP        diphenhydrAMINE (BENADRYL) injection 12.5 mg  12.5 mg IntraVENous Q4H PRN Pham Reid NP   12.5 mg at 01/28/17 1449    hydrALAZINE (APRESOLINE) 20 mg/mL injection 10 mg  10 mg IntraVENous Q4H PRN Kael Yañez MD   10 mg at 02/03/17 0825    acetaminophen (TYLENOL) suppository 650 mg  650 mg Rectal Q4H PRN Pham Reid NP   650 mg at 01/26/17 1250    aspirin delayed-release tablet 81 mg  81 mg Oral DAILY Lamount Parkers, DO   81 mg at 02/03/17 0829    latanoprost (XALATAN) 0.005 % ophthalmic solution 1 Drop  1 Drop Both Eyes QHS Lamount Parkers, DO   1 Drop at 02/02/17 2200    metoprolol tartrate (LOPRESSOR) tablet 50 mg  50 mg Oral BID Lamount Parkers, DO   50 mg at 02/03/17 8077    timolol (TIMOPTIC) 0.5 % ophthalmic solution 1 Drop  1 Drop Both Eyes DAILY Lamount Parkers, DO   1 Drop at 02/03/17 0830    sodium chloride (NS) flush 5-10 mL  5-10 mL IntraVENous Q8H Lamount Parkers, DO   10 mL at 01/27/17 4042    sodium chloride (NS) flush 5-10 mL  5-10 mL IntraVENous PRN Lamount Parkers, DO   5 mL at 01/29/17 1151    acetaminophen (TYLENOL) tablet 650 mg  650 mg Oral Q6H PRN Lamount Parkers, DO   650 mg at 01/29/17 8091    ondansetron (ZOFRAN) injection 4 mg  4 mg IntraVENous Q6H PRN Lamount Parkers, DO   4 mg at 01/25/17 2220    heparin (porcine) injection 5,000 Units  5,000 Units SubCUTAneous Q8H Yvette Starks DO   5,000 Units at 02/03/17 1183    promethazine (PHENERGAN) injection 6.25 mg  6.25 mg IntraMUSCular Q6H PRN Noe Alvarez Chaz Finnegan, NP   6.25 mg at 17 1726    morphine injection 2 mg  2 mg IntraVENous Q3H PRN Cintia Valdez, DO   2 mg at 17 0114       Review of Systems negative with exception of pertinent positives noted above  PHYSICAL EXAM     Visit Vitals    /72    Pulse 87    Temp 97.8 °F (36.6 °C)    Resp 16    Ht 5' 4\" (1.626 m)    Wt 45.4 kg (100 lb)    SpO2 98%    Breastfeeding No    BMI 17.16 kg/m2      Temp (24hrs), Av.7 °F (36.5 °C), Min:97 °F (36.1 °C), Max:98.2 °F (36.8 °C)    Oxygen Therapy  O2 Sat (%): 98 % (17 0750)  Pulse via Oximetry: 70 beats per minute (17 0255)  O2 Device: Room air (17 1945)    Intake/Output Summary (Last 24 hours) at 17 1312  Last data filed at 17   Gross per 24 hour   Intake              480 ml   Output                0 ml   Net              480 ml      General: No acute distress    Lungs: Diminished all lobes, poor resp effort  Heart:  S1S2 present without murmurs rubs gallops. RRR. No edema  Abdomen: semi-soft, mild distention, no tenderness, BS present  Extremities: Moves ext well  Neurologic:  No focal deficits  Psych: A/O X3  Results summary of Diagnostic Studies/Procedures copied from within [facility] EMR:         Tolu Shelton 96 Problems    Diagnosis Date Noted    [facility]    Diarrhea 2017    Nausea 2017    Hyperkalemia 2015    Chronic kidney disease, stage III (moderate) 2013     Plan:  Ileus  CT showing ileus, no obstruction  NGT removed , has tolerated diet without n/v   KUB with stable gaseous distention 17  Advance to full liquid diet and increased in abd distention.   Will go back to clear liquid diet      HTN: chronic  elevated  Increased Norvasc 2/3/17  Continue prn hydralazine      GLADYS:  Creatinine back to baseline  BMP in AM  Continue IVF      Hypernatremia:  Resolved  Continue 0.45% NS  BMP in AM      Hypokalemia: resolved  BMP in AM      HCAP:  CXR 1/30/17 showing Increasing bibasilar infiltrate, left greater than right  Continue Levaquin      Diarrhea:  cdiff negative      Time spent with pt 30 min  Notes, labs, VS, diagnostic testing reviewed       DVT Prophylaxis: heparin sq  Plan of Care Discussed with: Supervising MD Dr. Alexandra Ring, care team, pt , daughter at bedside  Percy Zaragoza NP           Case d/w NP, agree with documentation and care plan

## 2017-02-03 NOTE — PROGRESS NOTES
Patient in bed. Patient denies needs at present. Alert and oriented x4. Active bowel sounds to all 4 quadrants, no pain, distended and semi-soft. BP manually 190/80-will give BP meds. 0.45 ns infusion decreased to 50 ml/hr at this time. Neurovascular status WDL. Palpable pulses. Instructed not to get up by themselves and call for assistance or any needs. Patient verbalized understanding. Call bell within reach. Side rails up x3. Bed low and locked. No distress noted.

## 2017-02-03 NOTE — PROGRESS NOTES
Zofran 4 mg given IV for nausea. Pulled patient up in bed and got her comfortable. Daughter will be leaving to go eat but will be back in an hour. No distress noted.

## 2017-02-03 NOTE — PROGRESS NOTES
Patient is A&Ox4. Able to verbalize needs. Resting quietly with no distress noted. Shift assessment completed. Pt blind in both eyes. Eye drops given in both eyes. Neurovascular and peripheral vascular checks WNL. Voiding clear yellow urine. Wearing incontinent briefs. Multiple loose BMs. PIV infusing. Dressing to PIV intact with no S/S of infection. Denies needs. Bed low and locked. Call light within reach. Instructed to call for assistance. Patient verbalizes understanding. Family members at bedside.  Will continue to monitor

## 2017-02-04 NOTE — PROGRESS NOTES
Hospitalist Progress Note    2017  Admit Date: 2017  9:56 PM   NAME: Rekha Cortez   :  3/13/1924   MRN:  [id number]   Attending: Jim Raman MD  PCP:  Sj Downey MD  Treatment Team: Attending Provider: Jim Raman MD; Utilization Review: Laila Caro    Full Code   SUBJECTIVE:   Ms. Akosua Carroll is a 81 yo female who presented with c/o n/v/d, abd pain. Was found to have ileus without obstruction on CT abd. NGT placed, removed 16. Surgery consulted, signed off, no surgical intervention needed. 17 Pt abd more distended, c/o increased pain however able to tolerate PO intake without n/v, had diarrhea. KUB  showing stable gaseous distention without free air. Pt became febrile and started on Vanc and Zosyn for presumably for HCAP, fever has since resolved and changed to levaquin. Pt found to be guiac + with drop in hgb from 11.1 on admission to 7.5, received 2 units PRBC. Hgb stable. Pt without leukocytosis, creatinine at baseline, was hypernatremic, IVF changed to 0.45% NS, resolved. Denies CP, SOB, n/v. Cdiff negative. Has been tolerating clear liquid diet without n/v/abd pain advanced to full liquids and c/o increased abd pain. Reports less abd distention today.       Past Medical History   Diagnosis Date    Blindness - both eyes 0    CKD (chronic kidney disease) stage 3, GFR 30-59 ml/min      baseline creatinine 1.8    Hypertension     Other ill-defined conditions(799.89)      shingles     Recent Results (from the past 24 hour(s))   CBC WITH AUTOMATED DIFF    Collection Time: 17 10:08 AM   Result Value Ref Range    WBC 13.4 (H) 4.3 - 11.1 K/uL    RBC 3.35 (L) 4.05 - 5.25 M/uL    HGB 9.9 (L) 11.7 - 15.4 g/dL    HCT 30.8 (L) 35.8 - 46.3 %    MCV 91.9 79.6 - 97.8 FL    MCH 29.6 26.1 - 32.9 PG    MCHC 32.1 31.4 - 35.0 g/dL    RDW 14.8 (H) 11.9 - 14.6 %    PLATELET 721 011 - 533 K/uL    MPV 10.9 10.8 - 14.1 FL    NEUTROPHILS 82 (H) 47 - 75 %    LYMPHOCYTES 11 (L) 16 - 44 %    MONOCYTES 7 3 - 9 %    ABS. NEUTROPHILS 11.0 (H) 1.7 - 8.2 K/UL    ABS. LYMPHOCYTES 1.5 0.5 - 4.6 K/UL    ABS.  MONOCYTES 0.9 0.1 - 1.3 K/UL    RBC COMMENTS SLIGHT  MICROCYTOSIS        RBC COMMENTS SLIGHT  POLYCHROMASIA        PLATELET COMMENTS ADEQUATE      DF MANUAL     METABOLIC PANEL, BASIC    Collection Time: 02/04/17 10:08 AM   Result Value Ref Range    Sodium 139 136 - 145 mmol/L    Potassium 5.2 (H) 3.5 - 5.1 mmol/L    Chloride 107 98 - 107 mmol/L    CO2 20 (L) 21 - 32 mmol/L    Anion gap 12 7 - 16 mmol/L    Glucose 96 65 - 100 mg/dL    BUN 24 (H) 8 - 23 MG/DL    Creatinine 1.37 (H) 0.6 - 1.0 MG/DL    GFR est AA 46 (L) >60 ml/min/1.73m2    GFR est non-AA 38 (L) >60 ml/min/1.73m2    Calcium 8.9 8.3 - 10.4 MG/DL     No Known Allergies  Current Facility-Administered Medications   Medication Dose Route Frequency Provider Last Rate Last Dose    heparin (porcine) injection 5,000 Units  5,000 Units SubCUTAneous Q8H Violet Singh MD   5,000 Units at 02/04/17 1156    amLODIPine (NORVASC) tablet 10 mg  10 mg Oral DAILY Phillip Muller NP   10 mg at 02/04/17 0906    levoFLOXacin (LEVAQUIN) 750 mg in D5W IVPB  750 mg IntraVENous Q48H Phillip Muller  mL/hr at 02/04/17 1157 750 mg at 02/04/17 1157    Lactobacillus Acidoph & Bulgar [facility]) tablet 2 Tab  2 Tab Oral BID Yasminwhitley Walker NP   1 Tab at 02/04/17 1878    0.45% sodium chloride infusion  100 mL/hr IntraVENous CONTINUOUS Gisella Danielle  mL/hr at 02/04/17 0354 100 mL/hr at 02/04/17 0354    0.9% sodium chloride infusion 250 mL  250 mL IntraVENous PRN Yasmin Walker NP        diphenhydrAMINE (BENADRYL) injection 12.5 mg  12.5 mg IntraVENous Q4H PRN Yasmin Walker NP   12.5 mg at 01/28/17 1449    hydrALAZINE (APRESOLINE) 20 mg/mL injection 10 mg  10 mg IntraVENous Q4H PRN Jose Borges MD   10 mg at 02/03/17 0825    acetaminophen (TYLENOL) suppository 650 mg  650 mg Rectal Q4H PRN Yasmin Walker NP 650 mg at 17 1250    aspirin delayed-release tablet 81 mg  81 mg Oral DAILY Lana Sierra, DO   81 mg at 17 5872    latanoprost (XALATAN) 0.005 % ophthalmic solution 1 Drop  1 Drop Both Eyes QHS Lana Sierra, DO   1 Drop at 17 2113    metoprolol tartrate (LOPRESSOR) tablet 50 mg  50 mg Oral BID [facility]   50 mg at 17 9894    timolol (TIMOPTIC) 0.5 % ophthalmic solution 1 Drop  1 Drop Both Eyes DAILY Lana Sierra, DO   1 Drop at 17 0901    sodium chloride (NS) flush 5-10 mL  5-10 mL IntraVENous Q8H [facility], [facility]   5 mL at 17 2113    sodium chloride (NS) flush 5-10 mL  5-10 mL IntraVENous PRN [facility]   5 mL at 17 1151    acetaminophen (TYLENOL) tablet 650 mg  650 mg Oral Q6H PRN Lana Sierra, DO   650 mg at 17 4089    ondansetron (ZOFRAN) injection 4 mg  4 mg IntraVENous Q6H PRN Lana Sierra, DO   4 mg at 17 1343    promethazine (PHENERGAN) injection 6.25 mg  6.25 mg IntraMUSCular Q6H PRN Neema Chase NP   6.25 mg at 17 1726    morphine injection 2 mg  2 mg IntraVENous Q3H PRN Lana Sierra, DO   2 mg at 17 0114       Review of Systems negative with exception of pertinent positives noted above  PHYSICAL EXAM     Visit Vitals    /76    Pulse 71    Temp 98.5 °F (36.9 °C)    Resp 28    Ht 5' 4\" (1.626 m)    Wt 45.4 kg (100 lb)    SpO2 97%    Breastfeeding No    BMI 17.16 kg/m2      Temp (24hrs), Av.3 °F (36.8 °C), Min:97.7 °F (36.5 °C), Max:98.7 °F (37.1 °C)    Oxygen Therapy  O2 Sat (%): 97 % (17 1500)  Pulse via Oximetry: 70 beats per minute (17 0255)  O2 Device: Room air (17 194)    Intake/Output Summary (Last 24 hours) at 17 1713  Last data filed at 17 1455   Gross per 24 hour   Intake             2713 ml   Output                0 ml   Net             2713 ml    General: No acute distress    Lungs: Diminished all lobes, poor resp effort  Heart:  S1S2 present without murmurs rubs gallops. RRR.  No edema  Abdomen: soft, non distended, no tenderness, BS present  Extremities: Moves ext well  Neurologic:  No focal deficits  Psych: A/O X3    Results summary of Diagnostic Studies/Procedures copied from within [facility] EMR:         De Comert 96 Problems    Diagnosis Date Noted    GLADYS (acute kidney injury) ([facility] [address] 01/25/2017    Diarrhea 01/25/2017    Nausea 01/25/2017    Hyperkalemia 08/08/2015    Chronic kidney disease, stage III (moderate) 05/03/2013     Plan:    Ileus  CT showing ileus, no obstruction  NGT removed 1/29, has tolerated diet without n/v   KUB with stable gaseous distention 1/31/17  Advance to full liquid diet       HTN: chronic  elevated  Increased Norvasc 2/3/17  Continue prn hydralazine      GLADYS:  Creatinine back to baseline  BMP in AM  Continue IVF      Hypernatremia:  Resolved  Continue 0.45% NS  BMP in AM      Hypokalemia:   resolved  BMP in AM      HCAP:  CXR 1/30/17 showing Increasing bibasilar infiltrate, left greater than right  Continue Levaquin      Diarrhea:  cdiff negative      Time spent with pt 30 min  Notes, labs, VS, diagnostic testing reviewed    Hyperkalemia  Stop KCL        DVT Prophylaxis: heparin sq  Plan of Care Discussed with: Supervising MD Dr. Ruchi Gonzalez, care team, pt , daughter at [address], NP         Ara Vann, GEETHA

## 2017-02-04 NOTE — PROGRESS NOTES
Shift assessment complete. Pt alert and oriented x4, pt is blind, respirations present, even and unlabored, HOB elevated, pt denies any SOB at this time, breath sounds of the right lung diminished, S1&S2 auscultated, HR regular, abd semi-soft, distended, and nontender, pt has diarrhea, hyperactive BS in all 4 quadrants, pt is up with assistance to the bathroom, voiding to a brief, IVF infusing without difficulty, No pressure ulcers or edema noted, pt has patch of no pigmentation to face and abdomen, pt denies any pain at this time, pt instructed to call for assistance, pt verbalizes understanding, bed low and locked, side rails x3, call light within reach.

## 2017-02-04 NOTE — PROGRESS NOTES
Semi fowlers position,awake and very alert,verbally responsive,,assessment completed,,IV line patent on right arm with fluids infusing,,medications crushed and mixed with orange jello,taken well,,no complaints of pain or shortness of breath noted,,abdomen distended semi soft and hyperactive bowel sounds,,immediate needs attended to by daughter. ..

## 2017-02-05 NOTE — PROGRESS NOTES
Occupational and Physical therapy note: attempted to see patient this am. Patient declined OT 1 attempt and PT 2 attempts attempts. Daughter present. Patient lives with daughter in a one level home with no steps to enter. Patient is blind but was able to to get out of bed and ambulate to the bathroom for toileting by herself holding onto furniture and door knobs. Her daughter would assist her with dressing, and patient was getting into the tub with assistance from daughter. Patient plans to return to daughters home. She owns a w/c and a raised toilet seat with rails. Will re attempt evaluation at another time.  Treva Matthew OTR/L.

## 2017-02-05 NOTE — PROGRESS NOTES
Patient refused therapy according to PT personnel ( Heath.-name),,patient stated Ruthie Shelton is tired and don't feel like getting up\",,,therapist will try again this Emy Richardson NP was in earlier to see patient,,orders made for CT scan of the abdomen / pelvis without contrast today. .. Ysabel Harman Ysabel Harman Ysabel Harman

## 2017-02-05 NOTE — PROGRESS NOTES
Patient transferred per bed to Med.-Surg.unit for routine progression of care,,report given to Darya (name),,daughter in room with the patient. .. Girish Elliott

## 2017-02-05 NOTE — PROGRESS NOTES
Received patient awake, alert and oriented in bed. Bed is soiled at this time from brief leaking, complete bed change required. Nursing assessment completed. Bed in low and locked position. Patient denies pain or nausea. Family is at bedside. Patient instructed to call for assistance, if needed. Right hand IV is patent infusing fluids as ordered.

## 2017-02-05 NOTE — PROGRESS NOTES
Hospitalist Progress Note    2017  Admit Date: 2017  9:56 PM   NAME: Kristin Rain   :  3/13/1924   MRN:  [id number]   Attending: Khanh Harrington MD  PCP:  Josefina Becker MD  Treatment Team: Attending Provider: Khanh Harrington MD; Utilization Review: Asad Cherry    Full Code   SUBJECTIVE:   Ms. Saeid Bishop is 80 to female who presented with c/o n/v/d, abd pain. Was found to have ileus without obstruction on CT abd. NGT placed, removed 16. Surgery consulted, signed off, no surgical intervention needed. 17 Pt abd more distended, c/o increased pain however able to tolerate PO intake without n/v, had diarrhea. KUB  showing stable gaseous distention without free air. Pt became febrile and started on Vanc and Zosyn for presumably for HCAP, fever has since resolved and changed to levaquin. Pt found to be guiac + with drop in hgb from 11.1 on admission to 7.5, received 2 units PRBC. Hgb stable. Pt without leukocytosis, creatinine at baseline, was hypernatremic, IVF changed to 0.45% NS, resolved. Cdiff negative. Tolerated clear liquids however difficult to advance to full liquids. Today after advancing her abd is much more distended, c/o abd pain. Denies n/v/d, CP, SOB. Is passing stool and gas.       Past Medical History   Diagnosis Date    Blindness - both eyes     CKD (chronic kidney disease) stage 3, GFR 30-59 ml/min      baseline creatinine 1.8    Hypertension     Other ill-defined conditions(799.89)      shingles     Recent Results (from the past 24 hour(s))   METABOLIC PANEL, BASIC    Collection Time: 17 11:10 AM   Result Value Ref Range    Sodium 137 136 - 145 mmol/L    Potassium 5.1 3.5 - 5.1 mmol/L    Chloride 105 98 - 107 mmol/L    CO2 24 21 - 32 mmol/L    Anion gap 8 7 - 16 mmol/L    Glucose 101 (H) 65 - 100 mg/dL    BUN 22 8 - 23 MG/DL    Creatinine 1.55 (H) 0.6 - 1.0 MG/DL    GFR est AA 40 (L) >60 ml/min/1.73m2    GFR est non-AA 33 (L) >60 ml/min/1.73m2 Calcium 8.8 8.3 - 10.4 MG/DL     No Known Allergies  Current Facility-Administered Medications   Medication Dose Route Frequency Provider Last Rate Last Dose    diatrizoate meglumine-d.sodium (MD-GASTROVIEW,GASTROGRAFIN) 66-10 % contrast solution 15 mL  15 mL Oral RAD ONCE Roman Florence MD        heparin (porcine) injection 5,000 Units  5,000 Units SubCUTAneous Q8H Roman Florence MD   5,000 Units at 02/05/17 3895    amLODIPine (NORVASC) tablet 10 mg  10 mg Oral DAILY Rosebriannala Fallow, NP   10 mg at 02/05/17 0929    levoFLOXacin (LEVAQUIN) 750 mg in D5W IVPB  750 mg IntraVENous Q48H Rosebriannala Fallow,  mL/hr at 02/04/17 1157 750 mg at 02/04/17 1157    Lactobacillus Acidoph & Bulgar CRESTWOOD [facility]) tablet 2 Tab  2 Tab Oral BID Cordella Angry, NP   2 Tab at 02/05/17 5848    0.45% sodium chloride infusion  100 mL/hr IntraVENous CONTINUOUS Cordella Angry,  mL/hr at 02/05/17 0159 100 mL/hr at 02/05/17 0159    0.9% sodium chloride infusion 250 mL  250 mL IntraVENous PRN Cordella Angry, NP        diphenhydrAMINE (BENADRYL) injection 12.5 mg  12.5 mg IntraVENous Q4H PRN Cordella Angry, NP   12.5 mg at 01/28/17 1449    hydrALAZINE (APRESOLINE) 20 mg/mL injection 10 mg  10 mg IntraVENous Q4H PRN Salma Newton MD   10 mg at 02/05/17 0435    acetaminophen (TYLENOL) suppository 650 mg  650 mg Rectal Q4H PRN Cordella Angry, NP   650 mg at 01/26/17 1250    aspirin delayed-release tablet 81 mg  81 mg Oral DAILY Josue Primas, DO   81 mg at 02/05/17 6191    latanoprost (XALATAN) 0.005 % ophthalmic solution 1 Drop  1 Drop Both Eyes QHS Josue Primas, DO   1 Drop at 02/04/17 2016    metoprolol tartrate (LOPRESSOR) tablet 50 mg  50 mg Oral BID Josue Primas, DO   50 mg at 02/05/17 4261    timolol (TIMOPTIC) 0.5 % ophthalmic solution 1 Drop  1 Drop Both Eyes DAILY Josue Guzmán DO   1 Drop at 02/04/17 0901    sodium chloride (NS) flush 5-10 mL  5-10 mL IntraVENous Q8H Sharmon Addison, DO   10 mL at 17 1400    sodium chloride (NS) flush 5-10 mL  5-10 mL IntraVENous PRN Sharmon Addison, DO   5 mL at 17 1151    acetaminophen (TYLENOL) tablet 650 mg  650 mg Oral Q6H PRN Sharmon Addison, DO   650 mg at 17 0865    ondansetron (ZOFRAN) injection 4 mg  4 mg IntraVENous Q6H PRN Sharmon Addison, DO   4 mg at 17 1343    promethazine (PHENERGAN) injection 6.25 mg  6.25 mg IntraMUSCular Q6H PRN Celine Mayi, NP   6.25 mg at 17 1726    morphine injection 2 mg  2 mg IntraVENous Q3H PRN Sharmon Addison, DO   2 mg at 17 0114       Review of Systems negative with exception of pertinent positives noted above  PHYSICAL EXAM     Visit Vitals    /75 (BP 1 Location: Left arm, BP Patient Position: At rest)    Pulse 80    Temp 96.9 °F (36.1 °C)    Resp 18    Ht 5' 4\" (1.626 m)    Wt 45.4 kg (100 lb)    SpO2 99%    Breastfeeding No    BMI 17.16 kg/m2      Temp (24hrs), Av.7 °F (36.5 °C), Min:96.6 °F (35.9 °C), Max:98.7 °F (37.1 °C)    Oxygen Therapy  O2 Sat (%): 99 % (17 1032)  Pulse via Oximetry: 70 beats per minute (17 0255)  O2 Device: Room air (17 0747)    Intake/Output Summary (Last 24 hours) at 17 1354  Last data filed at 17 0747   Gross per 24 hour   Intake             1801 ml   Output                0 ml   Net             1801 ml      General: No acute distress    Lungs: Diminished all lobes, poor resp effort  Heart:  S1S2 present without murmurs rubs gallops. RRR.  No edema  Abdomen: distended, no tenderness, BS hypoactive  Extremities: Moves ext well  Neurologic:  No focal deficits  Psych: A/O X3    Results summary of Diagnostic Studies/Procedures copied from within [facility] EMR:         Tolu Shelton 96 Problems    Diagnosis Date Noted    GLADYS [facility]acute kidney injury) ([facility] [address] 2017    Diarrhea 2017    Nausea 2017    Hyperkalemia 2015    Chronic kidney disease, stage III (moderate) 05/03/2013     Plan:    Ileus  1/25/17 CT showing ileus, no obstruction  NGT removed 1/29, has tolerated diet without n/v   KUB with stable gaseous distention 1/31/17  Will obtain CT w/wo oral contrast only abd today, abd more distended, increased abd pain      HTN: chronic  Elevated at times  Increased Norvasc 2/3/17  Continue prn hydralazine      GLADYS:  Creatinine back to baseline  BMP in AM  Continue IVF      Hypernatremia:  Resolved  Continue 0.45% NS  BMP in AM      Hypokalemia:   resolved  BMP in AM  Hold po replacement given increase in K yesterday      HCAP:  CXR 1/30/17 showing Increasing bibasilar infiltrate, left greater than right  Stop abx. Course completed      Diarrhea:  Resolved  cdiff negative    Pt and family refuse STR, adamant about taking pt home.       Time spent with pt 30 min  Notes, labs, VS, diagnostic testing reviewed           DVT Prophylaxis: heparin sq  Plan of Care Discussed with: Supervising MD Dr. Lien Vasquez, care team, pt, daughter at bedside  Uzma De Anda NP           Addendum:  CT scan showing obstruction. Discussed in length with pt and family. Pt refuses NG. Will re-consult surgery ? gastrograffin enema.

## 2017-02-05 NOTE — ROUTINE PROCESS
Phone report given to [facility] (name) on Mrs. Amy Martinez who is being transferred to the Med-Surg.unit for routine progression of care. ... Report on Situation,Background,Assessment,and Recommendation (SBAR)  The following report also provided. .... Peggyann Gang Intake and Output,,,,MAR's,,,Lab. results,,etc.. Opportunity for questions and for clarifications also provided to receiving nurse. Peggyann Gang Peggyann Gang Peggyann Gang Dash Gang

## 2017-02-05 NOTE — PROGRESS NOTES
Gastrografin solution mixed with juice given orally to patient and taken fairly well. .. Vivek White

## 2017-02-05 NOTE — PROGRESS NOTES
Positioned in bed for comfort,,incontinent briefs changed,mo BM but passing flatus,,abdomen remained distended. ...

## 2017-02-06 NOTE — PROGRESS NOTES
Pt resting quietly without complaints of pain, respirations are even no distress observed. No change in assessment no needs voiced.  Family at bedside

## 2017-02-06 NOTE — PROGRESS NOTES
Morphine 2 mg slow IVP given for abdominal pain rated 5/10; no distress noted; daughter at bedside; will continue to monitor

## 2017-02-06 NOTE — PROGRESS NOTES
Hospitalist Progress Note    2017  Admit Date: 2017  9:56 PM   NAME: Rosibel Recinos   :  3/13/1924   MRN:  [id number]   Attending: Sanjiv Grajeda MD  PCP:  Lala Gomez MD  Treatment Team: Attending Provider: Sanjiv Grajeda MD; Utilization Review: Catherine García    Full Code   SUBJECTIVE:   Ms. Arron Lorenzo is 80 to female who presented with c/o n/v/d, abd pain. Was found to have ileus without obstruction on CT abd. NGT placed, removed 16. Surgery consulted, signed off, no surgical intervention needed. 17 Pt abd more distended, c/o increased pain however able to tolerate PO intake without n/v, had diarrhea. KUB  showing stable gaseous distention without free air. Pt became febrile and started on Vanc and Zosyn for presumably for HCAP, fever has since resolved and changed to levaquin. Pt found to be guiac + with drop in hgb from 11.1 on admission to 7.5, received 2 units PRBC. Hgb stable. Pt without leukocytosis, creatinine at baseline, was hypernatremic, IVF changed to 0.45% NS, resolved. Cdiff negative. Tolerated clear liquids however difficult to advance to full liquids. Today after advancing her abd is much more distended, c/o abd pain. Denies n/v/d, CP, SOB. Is passing stool and gas.       Past Medical History   Diagnosis Date    Blindness - both eyes     CKD (chronic kidney disease) stage 3, GFR 30-59 ml/min      baseline creatinine 1.8    Hypertension     Other ill-defined conditions(799.89)      shingles     Recent Results (from the past 24 hour(s))   METABOLIC PANEL, BASIC    Collection Time: 17 11:10 AM   Result Value Ref Range    Sodium 137 136 - 145 mmol/L    Potassium 5.1 3.5 - 5.1 mmol/L    Chloride 105 98 - 107 mmol/L    CO2 24 21 - 32 mmol/L    Anion gap 8 7 - 16 mmol/L    Glucose 101 (H) 65 - 100 mg/dL    BUN 22 8 - 23 MG/DL    Creatinine 1.55 (H) 0.6 - 1.0 MG/DL    GFR est AA 40 (L) >60 ml/min/1.73m2    GFR est non-AA 33 (L) >60 ml/min/1.73m2 Calcium 8.8 8.3 - 10.4 MG/DL     No Known Allergies  Current Facility-Administered Medications   Medication Dose Route Frequency Provider Last Rate Last Dose    heparin (porcine) injection 5,000 Units  5,000 Units SubCUTAneous Q8H Jr Pathak MD   5,000 Units at 02/06/17 0135    amLODIPine (NORVASC) tablet 10 mg  10 mg Oral DAILY Landy Cleary NP   10 mg at 02/06/17 0435    Lactobacillus Acidoph & Bulgar CRESTWOOD [facility]) tablet 2 Tab  2 Tab Oral BID Frankie Rader NP   2 Tab at 02/06/17 4558    0.9% sodium chloride infusion 250 mL  250 mL IntraVENous PRN Frankie Rader NP        diphenhydrAMINE (BENADRYL) injection 12.5 mg  12.5 mg IntraVENous Q4H PRN Frankie Rader NP   12.5 mg at 01/28/17 1449    hydrALAZINE (APRESOLINE) 20 mg/mL injection 10 mg  10 mg IntraVENous Q4H PRN Jules Arcos MD   10 mg at 02/05/17 0435    acetaminophen (TYLENOL) suppository 650 mg  650 mg Rectal Q4H PRN Frankie Rader NP   650 mg at 01/26/17 1250    aspirin delayed-release tablet 81 mg  81 mg Oral DAILY Jaleel Yahaira, DO   81 mg at 02/06/17 3134    latanoprost (XALATAN) 0.005 % ophthalmic solution 1 Drop  1 Drop Both Eyes QHS Jaleel Yahaira, DO   1 Drop at 02/05/17 2236    metoprolol tartrate (LOPRESSOR) tablet 50 mg  50 mg Oral BID Jaleel Yahaira, DO   50 mg at 02/06/17 5393    timolol (TIMOPTIC) 0.5 % ophthalmic solution 1 Drop  1 Drop Both Eyes DAILY Jaleel Yahaira, DO   1 Drop at 02/05/17 0900    sodium chloride (NS) flush 5-10 mL  5-10 mL IntraVENous Q8H Jaleel Yahaira, DO   5 mL at 02/06/17 3004    sodium chloride (NS) flush 5-10 mL  5-10 mL IntraVENous PRN Jaleel Yahaira, DO   5 mL at 01/29/17 1151    acetaminophen (TYLENOL) tablet 650 mg  650 mg Oral Q6H PRN Jaleel Yahaira, DO   650 mg at 01/29/17 0923    ondansetron (ZOFRAN) injection 4 mg  4 mg IntraVENous Q6H PRN Jaleel Yahaira, DO   4 mg at 02/03/17 1343    promethazine (PHENERGAN) injection 6.25 mg  6.25 mg IntraMUSCular Q6H PRN Hardie Meigs, NP   6.25 mg at 17 1726    morphine injection 2 mg  2 mg IntraVENous Q3H PRN Alee Toscano DO   2 mg at 17       Review of Systems negative with exception of pertinent positives noted above  PHYSICAL EXAM     Visit Vitals    /79 (BP 1 Location: Left arm, BP Patient Position: At rest;Head of bed elevated (Comment degrees))    Pulse 90    Temp 96.7 °F (35.9 °C)    Resp 18    Ht 5' 4\" (1.626 m)    Wt 45.4 kg (100 lb)    SpO2 96%    Breastfeeding No    BMI 17.16 kg/m2      Temp (24hrs), Av.5 °F (36.4 °C), Min:96.5 °F (35.8 °C), Max:99 °F (37.2 °C)    Oxygen Therapy  O2 Sat (%): 96 % (17 0815)  Pulse via Oximetry: 70 beats per minute (17 0255)  O2 Device: Room air (17 0747)  No intake or output data in the 24 hours ending 17 0831   General: No acute distress    Lungs: Diminished all lobes, poor resp effort  Heart:  S1S2 present without murmurs rubs gallops. RRR. No edema  Abdomen: distended, no tenderness, BS hypoactive  Extremities: Moves ext well  Neurologic:  No focal deficits  Psych: A/O X3    Results summary of Diagnostic Studies/Procedures copied from within [facility] EMR:         De Comert 96 Problems    Diagnosis Date Noted    GL[facility]YS [facility]acute [facility] injury) ([facility] [address] 2017    Diarrhea 2017    Nausea 2017    Hyperkalemia 2015    Chronic kidney disease, stage III (moderate) 2013     Plan:    Ileus  17 CT showing ileus, no obstruction  NGT removed , has tolerated CL without n/v   KUB with stable gaseous distention 17  Awaiting recommendations from surgery regarding CT on 17. Family does not want surgical intervention, medical treatment only. ??gastrograffin enema for treatment/dx? ?   Await Dr. Gabrielle Hicks recommendation.     Results    CT ABD PELV WO CONT (Accession [id number]) (Order [id number])         Allergies        No Known Allergies     Result Information      Status Provider Status        Final result (Exam End: 2/5/2017  3:37 PM) Open        Study Result      CT ABDOMEN AND PELVIS WITHOUT CONTRAST     HISTORY: abdominal pain/distention, r/o obstruction, 89 years Female     COMPARISON: CT abdomen January 25, 2017     TECHNIQUE: Noncontrast axial helical CT images were obtained from above the  diaphragm through the pelvis. Coronal reformatted images were obtained at the  scanner console and made available for review. Radiation dose reduction techniques were used for this study: Our CT scanners  use one or all of the following: Automated exposure control, adjustment of the  mA and/or kVp according to patient's size, iterative reconstruction.     FINDINGS:     ABDOMEN:  There are new moderate bilateral pleural effusions with associated mild  dependent subsegmental atelectasis bilateral lung bases. Absent spleen. Gallbladder not definitely visualized. Normal-appearing liver, pancreas,  bilateral adrenal glands and kidneys. Mild calcific atherosclerosis of a normal  caliber abdominal aorta. No evidence of significant lymphadenopathy. Normal-appearing small bowel. No evidence of intraperitoneal free air or free  fluid.     PELVIS:  Normal-appearing urinary bladder. Patient is status post hysterectomy. Stool  ball in the rectum. There is stool and fluid throughout the proximal colon with  mild gaseous distention of the distal small bowel and proximal colon, with  relatively gradual transition in the region of the splenic flexure, these  findings could represent an acute mechanical colonic obstruction secondary to  adhesions or bands. Persistent sigmoid diverticula. Appendix not definitely  visualized due to significant distortion of anatomy. No evidence of pelvic free  fluid. No evidence of significant inguinal or pelvic sidewall lymphadenopathy. Visualized osseous structures unremarkable. IMPRESSION:      1.  Suspect an acute mechanical colonic obstruction with gradual transition in  the region of the splenic flexure, which may be secondary to adhesions or bands. 2. New moderate bilateral pleural effusions. 3. Other chronic findings as above.           HTN: chronic  Elevated at times  Increased Norvasc 2/3/17  Continue prn hydralazine      GLADYS:  Creatinine back to baseline  BMP in AM  Continue IVF      Hypernatremia:  Resolved  Continue 0.45% NS  BMP in AM      Hypokalemia:   resolved  BMP in AM  Hold po replacement given increase in K yesterday      HCAP:  CXR 1/30/17 showing Increasing bibasilar infiltrate, left greater than right  Stop abx.   Course completed      Diarrhea:  Resolved  cdiff negative    Pt and family refuse STR, adamant about taking pt home.       Notes, labs, VS, diagnostic testing reviewed      DVT Prophylaxis: heparin sq  Plan of Care Discussed with: Supervising MD Dr. Todd Hayward, care team, pt, daughter at bedside

## 2017-02-06 NOTE — PROGRESS NOTES
Late entry for 2/5/17 11:30 am : Pt refused PT today after 2 attempts. PT will follow up 2/6/17.   Myla Brennan KIEL

## 2017-02-06 NOTE — PROGRESS NOTES
Problem: Self Care Deficits Care Plan (Adult)  Goal: *Acute Goals and Plan of Care (Insert Text)  1. Patient will perform grooming with supervision. 2. Patient will perform Upper body dressing with CGA  3. Patient will perform lower body dressing with minimal assist.   4. Patient will perform upper and lower body bathing with CGA  5. Patient will perform toilet transfers with CGA. 6. Patient will perform shower transfer with CGA. 7. Patient will participate in 30 + minutes of ADL/ therapeutic exercise/therapeutic activity with min rest breaks to increase activity tolerance for self care. 8. Patient will perform ADL functional mobility in room with CGA. Goals to be achieved in 7 days. OCCUPATIONAL THERAPY: Initial Assessment 2/6/2017  INPATIENT: Hospital Day: 14  [place] / Product Type: Medicare /      NAME/AGE/GENDER: Charol Bence is a 80 y.o. female            PRIMARY DIAGNOSIS:  GLADYS (acute kidney injury) ([address] GLADYS (acute kidney injury) ([address] GLADYS (acute kidney injury) ([address]        ICD-10: Treatment Diagnosis:        · Generalized Muscle Weakness (M62.81)  · Other lack of cordination (R27.8)   Precautions/Allergies:         Review of patient's allergies indicates no known allergies. ASSESSMENT:      Ms. Autumn Mariee presents with general weakness with limited skills with self care, transfers, strength and endurance. Pt will benefit from follow up occupational therapy to help improve her functional level prior to returning home with caregiver. Need to work with her daughter due to the pt being blind & fearful since she is not in her familiar environment      This section established at most recent assessment   PROBLEM LIST (Impairments causing functional limitations):  1. Decreased Strength  2. Decreased ADL/Functional Activities  3. Decreased Transfer Abilities  4. Decreased Ambulation Ability/Technique  5. Decreased Balance  6.  Decreased Activity Tolerance    INTERVENTIONS PLANNED: (Benefits and precautions of occupational therapy have been discussed with the patient.)  1. Activities of daily living training  2. Adaptive equipment training  3. Balance training  4. Clothing management  5. Theraputic activity  6. Theraputic exercise      TREATMENT PLAN: Frequency/Duration: Follow patient 3x/week to address above goals. Rehabilitation Potential For Stated Goals: GOOD      RECOMMENDED REHABILITATION/EQUIPMENT: (at time of discharge pending progress): Continue Skilled Therapy and Home Health: Physical Therapy. OCCUPATIONAL PROFILE AND HISTORY:   History of Present Injury/Illness (Reason for Referral):  80 to female who presented with c/o n/v/d, abd pain. Was found to have ileus without obstruction on CT abd. NGT placed, removed 1/29/16. Surgery consulted, signed off, no surgical intervention needed. 1/31/17 Pt abd more distended, c/o increased pain however able to tolerate PO intake without n/v, had diarrhea. KUB 1/37/17 showing stable gaseous distention without free air. Pt became febrile and started on Vanc and Zosyn for presumably for HCAP, fever has since resolved and changed to levaquin. Pt found to be guiac + with drop in hgb from 11.1 on admission to 7.5, received 2 units PRBC. Hgb stable. Pt without leukocytosis, creatinine at baseline, was hypernatremic, IVF changed to 0.45% NS, resolved. Cdiff negative. Tolerated clear liquids however difficult to advance to full liquids. Today after advancing her abd is much more distended, c/o abd pain. Denies n/v/d, CP, SOB. Is passing stool and gas. Past Medical History/Comorbidities:   Ms. Chucky Goldstein  has a past medical history of Blindness - both eyes (1997); CKD (chronic kidney disease) stage 3, GFR 30-59 ml/min; Hypertension; and Other ill-defined conditions(229.89). Ms. Chucky Goldstein  has a past surgical history that includes heent and gi.   Social History/Living Environment:   Home Environment: Private residence  # Steps to Enter: 0  One/Two Story Residence: One story  Living Alone: No  Support Systems: Child(roopa)  Patient Expects to be Discharged to[de-identified] Private residence  Current DME Used/Available at Home: None  Prior Level of Function/Work/Activity:  Assist with ADLs and ambulation   Number of Personal Factors/Comorbidities that affect the Plan of Care: Expanded review of therapy/medical records (1-2):  MODERATE COMPLEXITY   ASSESSMENT OF OCCUPATIONAL PERFORMANCE[de-identified]   Activities of Daily Living:       Basic ADLs (From Assessment) Complex ADLs (From Assessment)   Basic ADL  Feeding: Minimum assistance  Oral Facial Hygiene/Grooming: Moderate assistance  Bathing: Maximum assistance  Upper Body Dressing: Minimum assistance  Lower Body Dressing: Maximum assistance  Toileting: Maximum assistance     Grooming/Bathing/Dressing Activities of Daily Living     Cognitive Retraining  Safety/Judgement: Fall prevention                 Functional Transfers  Toilet Transfer : Minimum assistance;Assist x2  Shower Transfer: Minimum assistance;Assist x2     Bed/Mat Mobility  Supine to Sit: Minimum assistance  Sit to Supine: Minimum assistance  Sit to Stand: Minimum assistance;Assist x2  Bed to Chair: Minimum assistance;Assist x2  Scooting: Minimum assistance          Most Recent Physical Functioning:   Gross Assessment:  AROM: Generally decreased, functional (BUE)  Strength: Generally decreased, functional (BUE)  Coordination: Generally decreased, functional (BUE)  Tone: Normal  Sensation: Intact               Posture:     Balance:  Sitting: Intact; Without support  Standing: Impaired; With support Bed Mobility:  Supine to Sit: Minimum assistance  Sit to Supine: Minimum assistance  Scooting: Minimum assistance  Wheelchair Mobility:     Transfers:  Sit to Stand: Minimum assistance;Assist x2  Stand to Sit: Minimum assistance;Assist x2  Bed to Chair: Minimum assistance;Assist x2                 Patient Vitals for the past 6 hrs:       BP BP Patient Position SpO2 Pulse   17 0815 183/79 At rest;Head of bed elevated (Comment degrees) 96 % 90        Mental Status  Neurologic State: Alert  Orientation Level: Oriented X4  Cognition: Decreased attention/concentration, Impaired decision making  Perception: Appears intact  Perseveration: No perseveration noted  Safety/Judgement: Fall prevention              LLE Assessment  LLE Assessment (WDL): Exception to WDL RLE Assessment  RLE Assessment (WDL): Exceptions to [facility]              Physical Skills Involved:  1. Balance  2. Mobility  3. Strength Cognitive Skills Affected (resulting in the inability to perform in a timely and safe manner):  1. Understanding Psychosocial Skills Affected:  1. Environmental Adaptations   Number of elements that affect the Plan of Care: 1-3:  LOW COMPLEXITY   CLINICAL DECISION MAKIN[address] [place], [place] [id number] AM-PAC 6 Clicks   Basic Mobility Inpatient Short Form  How much help from another person does the patient currently need. .. Total A Lot A Little None   1. Putting on and taking off regular lower body clothing?   [ ] 1   [X] 2   [ ] 3   [ ] 4   2. Bathing (including washing, rinsing, drying)? [ ] 1   [X] 2   [ ] 3   [ ] 4   3. Toileting, which includes using toilet, bedpan or urinal?   [ ] 1   [X] 2   [ ] 3   [ ] 4   4. Putting on and taking off regular upper body clothing?   [ ] 1   [ ] 2   [X] 3   [ ] 4   5. Taking care of personal grooming such as brushing teeth? [ ] 1   [ ] 2   [X] 3   [ ] 4   6. Eating meals? [ ] 1   [ ] 2   [X] 3   [ ] 4   © , Trustees of [address] [place] [id number], under license to [employer]. All rights reserved    Score:  Initial: 15 Most Recent: X (Date: -- )     Interpretation of Tool:  Represents activities that are increasingly more difficult (i.e. Bed mobility, Transfers, Gait).        Score 24 23 22-20 19-15 14-10 9-7 6       Modifier CH CI CJ CK CL CM CN         · Self Care:               - CURRENT STATUS: CK - 40%-59% impaired, limited or restricted               - GOAL STATUS:           CJ - 20%-39% impaired, limited or restricted               - D/C STATUS:                       ---------------To be determined---------------  [place] / Product Type: Medicare /       Medical Necessity:     · Patient is expected to demonstrate progress in strength, balance, coordination and functional technique to increase independence with self care and functional mobility. Reason for Services/Other Comments:  · Patient continues to require skilled intervention due to decrease self care and mobility. Use of outcome tool(s) and clinical judgement create a POC that gives a: LOW COMPLEXITY             TREATMENT:   (In addition to Assessment/Re-Assessment sessions the following treatments were rendered)      Pre-treatment Symptoms/Complaints:    Pain: Initial:   Pain Intensity 1: 0  Post Session:  0      Assessment/Reassessment only, no treatment provided today     Braces/Orthotics/Lines/Etc:   · O2 Device: Room air  Treatment/Session Assessment:    · Response to Treatment:  Tolerated well  · Interdisciplinary Collaboration:  · Physical Therapist  · Registered Nurse  · After treatment position/precautions:  · Supine in bed  · Bed/Chair-wheels locked  · Bed in low position  · Caregiver at bedside  · Call light within reach  · RN notified  · Side rails x 3  · Compliance with Program/Exercises: Will assess as treatment progresses. · Recommendations/Intent for next treatment session: \"Next visit will focus on advancements to more challenging activities and reduction in assistance provided\".   Total Treatment Duration:  OT Patient Time In/Time Out  Time In: 0911  Time Out: 3600 April Patel OT

## 2017-02-06 NOTE — PROGRESS NOTES
2-7-17   Per MD pt stable for d/c. Pt was evaluated by [facility] and accepted for home hospice. Pt's daughter transported pt home and hospice services will start today. Pt's daughter Rick Banegas ) contacted [facility]. SABINO faxed hospice order to [facility] and spoke with CONRAD [facility]. [facility] 9am on Tues, with plans to d/c home.

## 2017-02-06 NOTE — PROGRESS NOTES
Care Management Interventions  PCP Verified by CM: Yes  Mode of Transport at Discharge: Other (see comment)  Transition of Care Consult (CM Consult): Discharge Planning  Current Support Network: Lives with Caregiver  Confirm Follow Up Transport: Family        Referral per MD.  D/C planning. Per MD Esqueda Check ) waiting on surgery to see pt but more than likely pt will d/c home. SABINO spoke with pt's daughter Gabrielle Jenkins ) at bedside who states pt lives with her and has for the last 12 yrs. Per daughter pt does not want surgery so likely will go home with hospice. Daughter states pt was with hospice about 5 yrs ago, can not recall name. SABINO will continue to follow after MD ( surgery ) sees pt.

## 2017-02-06 NOTE — PROGRESS NOTES
Problem: Interdisciplinary Rounds  Goal: Interdisciplinary Rounds  Interdisciplinary team rounds were held 2/6/2017 with the following team members:Care Management, Nursing, Nurse Practitioner, Nutrition, Pastoral Care, Pharmacy and Physician     Plan of care discussed. See clinical pathway and/or care plan for interventions and desired outcomes.

## 2017-02-06 NOTE — PROGRESS NOTES
Problem: Mobility Impaired (Adult and Pediatric)  Goal: *Acute Goals and Plan of Care (Insert Text)  1 WEEK GOALS :  (1.)Ms. Jorge Jorgensen will move from supine to sit and sit to supine with STAND BY ASSIST within 7 day(s). (2.)Ms. Pleitez will transfer from bed to chair and chair to bed with STAND BY ASSIST using the least restrictive device within 7 day(s). (3.)Ms. Pleitez will ambulate with STAND BY ASSIST for 200 feet with the least restrictive device within 7 day(s). 4) daughter safe to taking pt through AROM to UE & LEs with written guidelines    ________________________________________________________________________________________________      PHYSICAL THERAPY: INITIAL ASSESSMENT, TREATMENT DAY: DAY OF ASSESSMENT, AM 2/6/2017  INPATIENT: Hospital Day: 14  [place] / Product Type: Medicare /      NAME/AGE/GENDER: Madelyn Martinez is a 80 y.o. female            PRIMARY DIAGNOSIS: GLADYS (acute kidney injury) ([facility] [address] GLADYS (acute kidney injury) ([facility] [address] [facility] GLADYS (acute kidney injury) ([facility] [address] [facility]        ICD-10: Treatment Diagnosis:       · Generalized Muscle Weakness (M62.81)  · Other lack of cordination (R27.8)  · Difficulty in walking, Not elsewhere classified (R26.2)  · Other abnormalities of gait and mobility (R26.89)   Precaution/Allergies:  Review of patient's allergies indicates no known allergies. ASSESSMENT:      Ms. Jorge Jorgensen presents with general weakness with limited skills with bed mobility, transfers & gait. Pt will benefit from follow up therapy to help improve her functional level prior to returning home with caregiver. Need to work with her daughter due to the pt being blind & fearful since she is not in her familiar environment      This section established at most recent assessment   PROBLEM LIST (Impairments causing functional limitations):  1. Decreased Strength  2. Decreased ADL/Functional Activities  3. Decreased Transfer Abilities  4.  Decreased Ambulation Ability/Technique  5. Decreased Balance  6. Decreased Activity Tolerance  7. Decreased Flexibility/Joint Mobility  8. Decreased [place] with Home Exercise Program    INTERVENTIONS PLANNED: (Benefits and precautions of physical therapy have been discussed with the patient.)  1. Balance Exercise  2. Bed Mobility  3. Gait Training  4. Therapeutic Exercise/Strengthening  5. Transfer Training      TREATMENT PLAN: Frequency/Duration: daily for duration of hospital stay  Rehabilitation Potential For Stated Goals: FAIR      RECOMMENDED REHABILITATION/EQUIPMENT: (at time of discharge pending progress): Continue Skilled Therapy and possibly HHPT on discharge. HISTORY:   History of Present Injury/Illness (Reason for Referral):  Patient is a 80 y.o. female who presents with abdominal pain. Pt has h/o SBO several years ago. Her daughter  last week and pt has been grieving. She hasn't been eating or drinking well the last several days. She developed nausea and diarrhea about 2 days ago. Pain is across lower abdomen and constant. She denies chest pain, dyspnea, or fever. Past Medical History/Comorbidities:   Ms. Aurelio Gómez  has a past medical history of Blindness - both eyes (); CKD (chronic kidney disease) stage 3, GFR 30-59 ml/min; Hypertension; and Other ill-defined conditions(799.89). Ms. Aurelio Gómez  has a past surgical history that includes heent and gi. Social History/Living Environment:   Home Environment: Private residence  # Steps to Enter: 0  One/Two Story Residence: One story  Living Alone: No  Support Systems: Child(roopa)  Patient Expects to be Discharged to[de-identified] Private residence  Current DME Used/Available at Home: None  Prior Level of Function/Work/Activity:  Pt was functioning her familiar environment holding onto furniture with supervision  Personal Factors:           Other factors that influence how disability is experienced by the patient:  Current & PMH   Number of Personal Factors/Comorbidities that affect the Plan of Care: 1-2: MODERATE COMPLEXITY   EXAMINATION:   Most Recent Physical Functioning:   Gross Assessment:  AROM: Generally decreased, functional (LE's & core)  Strength: Generally decreased, functional (LE's & core)               Posture:     Balance:  Sitting: Intact; Without support  Standing: Impaired; With support (manual) Bed Mobility:  Supine to Sit: Minimum assistance  Sit to Supine: Minimum assistance  Scooting: Minimum assistance  Wheelchair Mobility:     Transfers:  Sit to Stand: Minimum assistance;Assist x2  Stand to Sit: Minimum assistance;Assist x2  Bed to Chair: Minimum assistance;Assist x2  Gait:     Base of Support: Narrowed  Speed/Suly: Shuffled; Slow  Step Length: Left shortened;Right shortened  Gait Abnormalities: Decreased step clearance; Path deviations; Shuffling gait;Trunk sway increased  Distance (ft): 15 Feet (ft)  Assistive Device:  (none, bilateral HHA)  Ambulation - Level of Assistance: Minimal assistance;Assist x2   Functional Mobility:         Gait/Ambulation:  min        Transfers:  min        Bed Mobility:  min   Body Structures Involved:  1. Eyes and Ears  2. Metabolic  3. Endocrine  4. Joints  5. Muscles  6. Ligaments Body Functions Affected:  1. Cardio  2. Respiratory  3. Movement Related  4. Metobolic/Endocrine Activities and Participation Affected:  1. Learning and Applying Knowledge  2. General Tasks and Demands  3. Mobility   Number of elements that affect the Plan of Care: 4+: HIGH COMPLEXITY   CLINICAL PRESENTATION:   Presentation: Stable and uncomplicated: LOW COMPLEXITY   CLINICAL DECISION MAKIN [facility] [address] [id number] AM-PAC 6 Clicks   Basic Mobility Inpatient Short Form  How much difficulty does the patient currently have. .. Unable A Lot A Little None   1. Turning over in bed (including adjusting bedclothes, sheets and blankets)? [ ] 1   [ ] 2   [X] 3   [ ] 4   2.   Sitting down on and standing up from a chair with arms ( e.g., wheelchair, bedside commode, etc.)   [ ] 1   [ ] 2   [X] 3   [ ] 4   3. Moving from lying on back to sitting on the side of the bed? [ ] 1   [ ] 2   [X] 3   [ ] 4   How much help from another person does the patient currently need. .. Total A Lot A Little None   4. Moving to and from a bed to a chair (including a wheelchair)? [ ] 1   [ ] 2   [X] 3   [ ] 4   5. Need to walk in hospital room? [ ] 1   [ ] 2   [X] 3   [ ] 4   6. Climbing 3-5 steps with a railing? [ ] 1   [ ] 2   [ ] 3   [ ] 4   © 2007, Trustees of [address] [place] [address] [address] [address], under license to [employer]. All rights reserved    Score:  Initial: 16 Most Recent: X (Date: -- )     Interpretation of Tool:  Represents activities that are increasingly more difficult (i.e. Bed mobility, Transfers, Gait). Score 24 23 22-20 19-15 14-10 9-7 6       Modifier CH CI CJ CK CL CM CN         · Mobility - Walking and Moving Around:               - CURRENT STATUS:    CK - 40%-59% impaired, limited or restricted               - GOAL STATUS:           CJ - 20%-39% impaired, limited or restricted               - D/C STATUS:                       ---------------To be determined---------------  [place] / Product Type: Medicare /       Medical Necessity:     · Patient is expected to demonstrate progress in strength, range of motion, balance, coordination and functional technique to decrease assistance required with bed mobility, transfers & gait. Reason for Services/Other Comments:  · Patient continues to require skilled intervention due to unsafe functional mobility.    Use of outcome tool(s) and clinical judgement create a POC that gives a: Clear prediction of patient's progress: LOW COMPLEXITY                 TREATMENT:   (In addition to Assessment/Re-Assessment sessions the following treatments were rendered)   Pre-treatment Symptoms/Complaints:  Reported being tired  Pain: Initial: visual scale  Pain Intensity 1: 0  Post Session:  0/10      Assessment/Reassessment only, no treatment provided today     Braces/Orthotics/Lines/Etc:   · IV  Treatment/Session Assessment:    · Response to Treatment:  Tolerated well  · Interdisciplinary Collaboration:  · Occupational Therapist  · Registered Nurse  · After treatment position/precautions:  · Supine in bed  · Bed/Chair-wheels locked  · Bed in low position  · Caregiver at bedside  · Call light within reach  · RN notified  · Family at bedside  · Compliance with Program/Exercises: Will assess as treatment progresses. · Recommendations/Intent for next treatment session: \"Next visit will focus on reduction in assistance provided\".   Total Treatment Duration:  PT Patient Time In/Time Out  Time In: 0900  Time Out: [address] [facility],

## 2017-02-06 NOTE — HOSPICE
[facility]-    Met with daughter briefly to discuss hospice services/support. She pulled a brochure from her purse and stated that pt \"had been in [facility] before and they were happy with their services so she had already contacted [facility] representative today and wishes to take her mother home with [facility] support\". Dr Erik Holley and Alta Murdock MSW made aware.     Thank you-  Mike Tomlinson RN  [facility] liaison  [phone]

## 2017-02-07 NOTE — PROGRESS NOTES
Resting quietly, resp even, unlab with no c/o during bedside report received from pt's previous nurse, Libby Singh LPN. No distress noted.

## 2017-02-07 NOTE — DISCHARGE INSTRUCTIONS
DISCHARGE SUMMARY from Nurse    The following personal items are in your possession at time of discharge:    Dental Appliances: At home  Visual Aid: None     Home Medications: None  Jewelry: None  Clothing: Sweater, Pants, Socks, Undergarments, Footwear, With patient  Other Valuables: None             PATIENT INSTRUCTIONS:    After general anesthesia or intravenous sedation, for 24 hours or while taking prescription Narcotics:  · Limit your activities  · Do not drive and operate hazardous machinery  · Do not make important personal or business decisions  · Do  not drink alcoholic beverages  · If you have not urinated within 8 hours after discharge, please contact your surgeon on call. Report the following to your surgeon:  · Excessive pain, swelling, redness or odor of or around the surgical area  · Temperature over 100.5  · Nausea and vomiting lasting longer than 4 hours or if unable to take medications  · Any signs of decreased circulation or nerve impairment to extremity: change in color, persistent  numbness, tingling, coldness or increase pain  · Any questions        What to do at Home:  Recommended activity: Activity as tolerated, home care and follow up are per hospice. *  Please give a list of your current medications to your Primary Care Provider. *  Please update this list whenever your medications are discontinued, doses are      changed, or new medications (including over-the-counter products) are added. *  Please carry medication information at all times in case of emergency situations. These are general instructions for a healthy lifestyle:    No smoking/ No tobacco products/ Avoid exposure to second hand smoke    Surgeon General's Warning:  Quitting smoking now greatly reduces serious risk to your health.     Obesity, smoking, and sedentary lifestyle greatly increases your risk for illness    A healthy diet, regular physical exercise & weight monitoring are important for maintaining a healthy lifestyle    You may be retaining fluid if you have a history of heart failure or if you experience any of the following symptoms:  Weight gain of 3 pounds or more overnight or 5 pounds in a week, increased swelling in our hands or feet or shortness of breath while lying flat in bed. Please call your doctor as soon as you notice any of these symptoms; do not wait until your next office visit. Recognize signs and symptoms of STROKE:    F-face looks uneven    A-arms unable to move or move unevenly    S-speech slurred or non-existent    T-time-call 911 as soon as signs and symptoms begin-DO NOT go       Back to bed or wait to see if you get better-TIME IS BRAIN. Warning Signs of HEART ATTACK     Call 911 if you have these symptoms:   Chest discomfort. Most heart attacks involve discomfort in the center of the chest that lasts more than a few minutes, or that goes away and comes back. It can feel like uncomfortable pressure, squeezing, fullness, or pain.  Discomfort in other areas of the upper body. Symptoms can include pain or discomfort in one or both arms, the back, neck, jaw, or stomach.  Shortness of breath with or without chest discomfort.  Other signs may include breaking out in a cold sweat, nausea, or lightheadedness. Don't wait more than five minutes to call 911 - MINUTES MATTER! Fast action can save your life. Calling 911 is almost always the fastest way to get lifesaving treatment. Emergency Medical Services staff can begin treatment when they arrive -- up to an hour sooner than if someone gets to the hospital by car. The discharge information has been reviewed with the patient. The patient verbalized understanding. Discharge medications reviewed with the patient and appropriate educational materials and side effects teaching were provided. Learning About Hospice and Palliative Care  What are hospice and palliative care?   Palliative (say \"PAL-urszula-uh-tiv\") care is an area of medicine that helps give you more good days by providing care for quality-of-life issues. It includes treating symptoms like pain, nausea, or sleep problems. It can also include helping you and your loved ones to:  · Understand your illness better. · Talk more openly about your feelings. · Decide what treatments you want or don't want. · Communicate better with your doctors, nurses, and each other. Hospice care is a type of palliative care. But it's for people who are near the end of life. What kinds of care are involved? Palliative care: This treatment helps you feel better physically, emotionally, and spiritually while doctors also treat your illness. Your care may include pain relief, counseling, or nutrition advice. Hospice care: Again, the goal of this type of care is to help you feel better. And it can help you get the most out of the time you have left. But you no longer get treatment to try to cure your illness. When does care happen? Palliative care: This care can happen at any time during a serious illness. You don't have to be near death to get this care. Hospice care: In most cases, you can choose hospice care when your doctor believes that you have no more than about 6 months to live. Where does the care happen? Palliative care: This care often happens in hospitals or long-term care facilities like nursing homes. It can take place wherever you are treated, even in your home. Hospice care: Most hospice care is done in the place the patient calls \"home. \" This is often the person's home. But it could also be a place like a nursing home or half-way center. Hospice care may also be given in hospice centers, hospitals, and other places. Who provides the care? Palliative care: There are doctors and nurses who specialize in this field. But your own doctor may also give some of this care. And there are many other experts who may help you.  These include social workers, counselors, therapists, and nutrition experts. Hospice care: In hospitals, hospice centers, and other facilities, care is given by doctors, nurses, and others who are trained in hospice care. In the home, a family member is often the main caregiver. But the family member gets help from care experts. They are on call 24 hours a day. Where can you learn more? Go to http://jacob-saranya.info/. Enter [id number] in the search box to learn more about \"Learning About Hospice and Palliative Care. \"  Current as of: February 24, 2016  Content Version: 11.1  © 9266-4365 [employer], [employer] Care instructions adapted under license by [employer] (which disclaims liability or warranty for this information). If you have questions about a medical condition or this instruction, always ask your healthcare professional. [address] any warranty or liability for your use of this information.

## 2017-02-07 NOTE — PROGRESS NOTES
Pt awake, alert, oriented x4. Pt resting quietly w/ daughter at bedside. Resp even and unlabored, lung sounds clear. Abdomen semi soft, nontender. Pt on clear liquid diet, ate her breakfast per daughter. Pt swallowing w/o difficulty, pills 1 at a time. Pt incontinent of urine and stool. No complaints voiced. All safety measures in place. Bed alarm on.

## 2017-02-07 NOTE — PROGRESS NOTES
Discharge instructions and prescriptions provided and explained to the pts daughter. Med side effect sheet reviewed. Opportunity for questions provided. Reminded daughter to call hospice RN Suzan Shoemaker when she is leaving the hospital. Instructed to call once ready to leave.

## 2017-02-07 NOTE — DISCHARGE SUMMARY
Physician Discharge Summary       Patient: Sia Hansen MRN: [id number]  SSN: [id number]    YOB: 1924  Age: 80 y.o. Sex: female    PCP: Mary Cavazos MD    Admit date: 2017  Admitting Provider: Beth Fuentes DO    Discharge date: 2017  Discharging Provider: Faheem Levin NP    Admission Diagnoses: GLADYS   Diarrhea  Nausea  Hyperkalemia  CKD III  Nausea  Pneumonia  Debility  Frailty  Severe protein calorie malnutrition  Ileus  SBO    Discharge Diagnoses:    Inoperable colonic obstruction    Hospital Course: Patient presented to ER 17 with complaints of nausea, vomiting, right lower quad abdominal pain and diarrhea for the 48 hours prior to presentation. No fever, any additional symptoms. Three episodes of diarrhea on day of presentation. Patient is blind. Daughter  four days ago, and she has been taking in very little po. She has history of SBO with bowel resection, CKD III, hypertension. She is admitted with IV fluids. Baseline ambulation with walker. She is found with ileus and N/G is placed, draining dark green fluid. Had bowel sounds, very small amount of gas passed. No further diarrhea. She began low grade fever  too 100.7 . Began zosyn and vanc 17 for possible HCAP and empiric gut coverage. Guiac +. Transfused for Hgb of 7.5, down from 11.1 on admission. Febrile to 101 on . Had soft stool on  without signs of GI bleed. Abdomen remained distended and painful. N/G removed on  with KUB still indicating possible ileus. Left basilar consolidation. Diarrhea restarted 17. Blood cultures with no growth. C Dif negative on 17. Tolerated clear liquids. No further diarrhea. Zosyn and vanc changed to levaquin. Diet advanced to full liquids. Blood pressure remained elevated requiring prn apresoline. Norvasc ordered, increased on 2/3.   Patient and daughter spoken to at length 2 per Dr Vianey Hall regarding prognosis and ongoing plans. Patient DNR. Patient does not want surgery or replacement of N/G. Seen by surgery in consult. Patient has lived with daughter Tyshawn Hutson for about sixteen years, will return home with her on hospice. Anxious to go home today. Procedures:   None    Consults:  Nutritionist, PT, OT, General surgery, Pharmacist, social service, Hospice,     Significant Diagnostic Studies:  ADMISSION LABS:     Ref. Range 1/24/2017 22:56   WBC Latest Ref Range: 4.3 - 11.1 K/uL 8.4   RBC Latest Ref Range: 4.05 - 5.25 M/uL 3.82 (L)   HGB Latest Ref Range: 11.7 - 15.4 g/dL 11.1 (L)   HCT Latest Ref Range: 35.8 - 46.3 % 34.7 (L)   MCV Latest Ref Range: 79.6 - 97.8 FL 90.8   MCH Latest Ref Range: 26.1 - 32.9 PG 29.1   MCHC Latest Ref Range: 31.4 - 35.0 g/dL 32.0   RDW Latest Ref Range: 11.9 - 14.6 % 13.4   PLATELET Latest Ref Range: 150 - 450 K/uL 294   MPV Latest Ref Range: 10.8 - 14.1 FL 11.3   NEUTROPHILS Latest Ref Range: 43 - 78 % 70   LYMPHOCYTES Latest Ref Range: 13 - 44 % 17   MONOCYTES Latest Ref Range: 4.0 - 12.0 % 12   EOSINOPHILS Latest Ref Range: 0.5 - 7.8 % 1   BASOPHILS Latest Ref Range: 0.0 - 2.0 % 0   IMMATURE GRANULOCYTES Latest Ref Range: 0.0 - 5.0 % 0.1   DF Latest Units:   AUTOMATED   ABS. NEUTROPHILS Latest Ref Range: 1.7 - 8.2 K/UL 5.9   ABS. IMM. GRANS. Latest Ref Range: 0.0 - 0.5 K/UL 0.0   ABS. LYMPHOCYTES Latest Ref Range: 0.5 - 4.6 K/UL 1.4   ABS. MONOCYTES Latest Ref Range: 0.1 - 1.3 K/UL 1.0   ABS. EOSINOPHILS Latest Ref Range: 0.0 - 0.8 K/UL 0.1   ABS.  BASOPHILS Latest Ref Range: 0.0 - 0.2 K/UL 0.0   Sodium Latest Ref Range: 136 - 145 mmol/L 140   Potassium Latest Ref Range: 3.5 - 5.1 mmol/L 5.8 (H)   Chloride Latest Ref Range: 98 - 107 mmol/L 104   CO2 Latest Ref Range: 21 - 32 mmol/L 26   Anion gap Latest Ref Range: 7 - 16 mmol/L 10   Glucose Latest Ref Range: 65 - 100 mg/dL 109 (H)   BUN Latest Ref Range: 8 - 23 MG/DL 73 (H)   Creatinine Latest Ref Range: 0.6 - 1.0 MG/DL 2.97 (H)   Calcium Latest Ref Range: 8.3 - 10.4 MG/DL 9.2   GFR est non-AA Latest Ref Range: >60 ml/min/1.73m2 16 (L)   GFR est AA Latest Ref Range: >60 ml/min/1.73m2 19 (L)   Bilirubin, total Latest Ref Range: 0.2 - 1.1 MG/DL 0.6   Protein, total Latest Ref Range: 6.3 - 8.2 g/dL 8.6 (H)   Albumin Latest Ref Range: 3.2 - 4.6 g/dL 4.1   Globulin Latest Ref Range: 2.3 - 3.5 g/dL 4.5 (H)   A-G Ratio Latest Ref Range: 1.2 - 3.5   0.9 (L)   ALT Latest Ref Range: 12 - 65 U/L 26   AST Latest Ref Range: 15 - 37 U/L 39 (H)   Alk. phosphatase Latest Ref Range: 50 - 136 U/L 40 (L)   Lipase Latest Ref Range: 73 - 393 U/L 384     URINALYSIS:     Ref. Range 1/27/2017 19:45   Color Latest Units:   YELLOW   Appearance Latest Units:   CLEAR   Specific gravity Latest Ref Range: 1.001 - 1.023   1.012   pH (UA) Latest Ref Range: 5.0 - 9.0   6.0   Protein Latest Ref Range: NEG mg/dL 30 (A)   Glucose Latest Units: mg/dL NEGATIVE   Ketone Latest Ref Range: NEG mg/dL TRACE (A)   Blood Latest Ref Range: NEG   NEGATIVE   Bilirubin Latest Ref Range: NEG   NEGATIVE   Urobilinogen Latest Ref Range: 0.2 - 1.0 EU/dL 0.2   Nitrites Latest Ref Range: NEG   NEGATIVE   Leukocyte Esterase Latest Ref Range: NEG   NEGATIVE   Epithelial cells Latest Ref Range: 0 /hpf 0   WBC Latest Ref Range: 0 /hpf 0   RBC Latest Ref Range: 0 /hpf 5-10   Bacteria Latest Ref Range: 0 /hpf 0     RADIOLOGY:      1/25:  CT ABDOMEN AND PELVIS:  Findings most consistent with a gastroenteritis. No evidence of obstruction. 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 Status post cholecystectomy. Evaluation of the abdominal viscera is limited without intravenous contrast.     1/25:  ACUTE ABDOMEN AND CHEST: Ileus with diffuse gaseous distention of the large and small bowel. Enteric contrast is present in the right hemicolon. 1/27:  CXR:  Small left pleural effusion with irregular density at the left lung base most in keeping with atelectasis.   Early pneumonia is not excluded. 1/28:  KUB:  No significant change    1/29:   KUB:  Persistent dilated loops of bowel. The findings could represent an ileus. 1/30:  CXR:  Increasing bibasilar infiltrate, left greater than right     1/31:  Abdomen:  Stable gaseous distention of bowel without evolving free air. 2/5:  CT ABDOMEN:  Suspect an acute mechanical colonic obstruction with gradual transition in the region of the splenic flexure, which may be secondary to adhesions or bands. New moderate bilateral pleural effusions. Other chronic findings as above. Discharge Exam:      /77    Pulse 82    Temp 99.1 °F (37.3 °C)    Resp 16    Ht 5' 4\" (1.626 m)    Wt 45.4 kg (100 lb)    SpO2 97%    Breastfeeding No    BMI 17.16 kg/m2     General appearance: Condition stable at present. Daughter at bedside. Head: Normocephalic, without obvious abnormality, atraumatic  Eyes: Blind  Neck: supple, symmetrical, trachea midline, no JVD  Lungs: Rhonchi left lower lung fields. Otherwise, clear to auscultation bilaterally, diminished in bases  Heart: regular rate and rhythm, S1, S2 normal, no murmur, click, rub or gallop. Intermittent mild tachycardia  Abdomen: Remains distended and firm. Passing small amounts of gas. Denies abdominal pain. Bowel sounds present all four quads. Extremities: extremities with poor muscle tone, no cyanosis or edema  Skin: Skin color, texture, turgor thin, pale. No rashes or lesions  Neurologic: Grossly normal, no unilateral deficit.      Discharge Condition: poor and stable  Disposition: home with [facility]    Discharge Medications:  Current Discharge Medication List      START taking these medications    Details   amLODIPine (NORVASC) 10 mg tablet Take 1 Tab by mouth daily. Qty: 30 Tab, Refills: 0      promethazine (PHENERGAN) 25 mg suppository Insert 1 Suppository into rectum every four (4) hours as needed for Nausea.   Qty: 15 Suppository, Refills: 0         CONTINUE these medications which have CHANGED    Details   traMADol (ULTRAM) 50 mg tablet Take 1 Tab by mouth every six (6) hours as needed for Pain. Qty: 30 Tab, Refills: 0         CONTINUE these medications which have NOT CHANGED    Details   nepafenac (NEVANAC) 0.1 % ophthalmic suspension Administer 1 Drop to both eyes two (2) times daily as needed for Pain.      metoprolol (LOPRESSOR) 50 mg tablet Take 50 mg by mouth two (2) times a day. iron, carbonyl (FEOSOL) 45 mg tab Take 1 Tab by mouth daily. erythromycin (ILOTYCIN) ophthalmic ointment Administer  to right eye nightly. aspirin 81 mg tablet Take 81 mg by mouth.      latanoprost (XALATAN) 0.005 % ophthalmic solution Administer 1 Drop to both eyes nightly. timolol (BETIMOL) 0.5 % ophthalmic solution Administer 1 Drop to both eyes daily. use in affected eye(s)           Follow-up Care/Patient Instructions: Activity: Activity as tolerated  Diet: As tolerated    Follow-up Information     Follow up With Details Comments [address]  MD César    Hospice RN As needed    As instructed by them and Social work  Joni Vald[address] [place] [address]  [phone]          Signed:  Davin Gonzales NP  2/7/2017  8:51 AM    Discharge time greater than 40 minutes      Agree with documentation and plan. Pt w/ non operable colonic obstruction and intolerant to full nutrition. Family and patient agreeable to discharge home w/ home hospice.

## 2017-02-07 NOTE — PROGRESS NOTES
Resting quietly, awake, resp remain even, unlab. Incontinent of urine with care given and repositioning. Reports comfortable. No distress noted. Family member at bedside.

## 2017-02-07 NOTE — PROGRESS NOTES
Resting quietly, resp even, unlab. Eyes closed with relaxed facial expression during bedside report given to oncoming nurse, Rachel Pappas RN. No distress noted.

## 2017-02-07 NOTE — PROGRESS NOTES
Awoke easily with no c/o. Incontinent of urine with care given. Repositioned, head of bed elevated, reporting comfortable. No distress.

## 2017-02-07 NOTE — PROGRESS NOTES
Continues to rest quietly, arousing easily with no c/o. Incontinent of stool smear with care given. Allevyn dsg clean, dry, intact to sacrum. Repositioned. Head of bed elevated. Reports comfortable. Legs elevated on pillow, heels off of bed.

## 2017-02-07 NOTE — PROGRESS NOTES
Continues to rest quietly, resp even, unlab, eyes closed. Awoke, keeping eyes closed. Skin warm, dry. Lungs sounds clear, abdom semisoft, distended with active bowel sounds. Brief clean, dry. Repositioned, reporting comfortable. No needs, no c/o. No distress noted.

## 2017-06-14 PROBLEM — K56.609 SBO (SMALL BOWEL OBSTRUCTION) (HCC): Status: ACTIVE | Noted: 2017-01-01

## 2017-06-14 PROBLEM — R63.6 UNDERWEIGHT: Status: ACTIVE | Noted: 2017-01-01

## 2017-06-14 NOTE — PROGRESS NOTES
AM Assessment. Pt. Is A/O X4. Respirations even and unlabored. Lungs clear. Abd. Distended. Bowel sounds active X4. Pt. Has no complaints at this time. Call light within reach.

## 2017-06-14 NOTE — PROGRESS NOTES
Surgery Admission Note  Callled by the hospitalists to see a patient for a bowel obstruction. The patient had bloodwork and a CT scan that showed: FINDINGS:     Lung bases are unremarkable.     Abdomen findings:     Sensitivity is diminished due to the absence of IV contrast.     There is intra and extrahepatic biliary ductal dilatation, similar to prior  exam. Liver contour appears within normal limits. Gallbladder is not seen. The  unenhanced pancreas is grossly unremarkable. Spleen is not seen. There is  thickening of the adrenal glands but with no discrete nodule.     There is mild right hydronephrosis. This is similar to prior exam. No  obstructive etiology is identified. No ureteral calculus is seen. Abdominal  aorta is normal in course and caliber with atherosclerotic calcifications.     There is wall thickening of the distal stomach. There are multiple fluid-filled  dilated loops of bowel. No transition point is identified.     Pelvic findings:     The colon is normal in course and caliber. Appendix is not positively seen. Urinary bladder is not well-seen.     There is no free air or free fluid. Bones are osteopenic. There are degenerative  changes of the spine.     IMPRESSION  IMPRESSION:     1. Multiple fluid-filled dilated loops of small bowel consistent with small  bowel obstruction. Etiology may be due to adhesion. No transition point is  identified.     2. No evidence of colitis or diverticulitis. No large bowel obstruction.     3. Absence of IV contrast limits sensitivity. IV contrast was not given due to  depressed renal function.   Recent Results (from the past 24 hour(s))   CBC WITH AUTOMATED DIFF    Collection Time: 06/13/17  8:50 PM   Result Value Ref Range    WBC 11.6 (H) 4.3 - 11.1 K/uL    RBC 3.75 (L) 4.05 - 5.25 M/uL    HGB 11.2 (L) 11.7 - 15.4 g/dL    HCT 34.0 (L) 35.8 - 46.3 %    MCV 90.7 79.6 - 97.8 FL    MCH 29.9 26.1 - 32.9 PG    MCHC 32.9 31.4 - 35.0 g/dL    RDW 13.0 11.9 - 14.6 % PLATELET 124 747 - 714 K/uL    MPV 10.0 (L) 10.8 - 14.1 FL    DF AUTOMATED      NEUTROPHILS 88 (H) 43 - 78 %    LYMPHOCYTES 7 (L) 13 - 44 %    MONOCYTES 5 4.0 - 12.0 %    EOSINOPHILS 0 (L) 0.5 - 7.8 %    BASOPHILS 0 0.0 - 2.0 %    IMMATURE GRANULOCYTES 0.3 0.0 - 5.0 %    ABS. NEUTROPHILS 10.2 (H) 1.7 - 8.2 K/UL    ABS. LYMPHOCYTES 0.8 0.5 - 4.6 K/UL    ABS. MONOCYTES 0.6 0.1 - 1.3 K/UL    ABS. EOSINOPHILS 0.0 0.0 - 0.8 K/UL    ABS. BASOPHILS 0.0 0.0 - 0.2 K/UL    ABS. IMM. GRANS. 0.0 0.0 - 0.5 K/UL   METABOLIC PANEL, COMPREHENSIVE    Collection Time: 06/13/17  8:50 PM   Result Value Ref Range    Sodium 141 136 - 145 mmol/L    Potassium 5.8 (H) 3.5 - 5.1 mmol/L    Chloride 103 98 - 107 mmol/L    CO2 27 21 - 32 mmol/L    Anion gap 11 7 - 16 mmol/L    Glucose 156 (H) 65 - 100 mg/dL    BUN 48 (H) 8 - 23 MG/DL    Creatinine 2.28 (H) 0.6 - 1.0 MG/DL    GFR est AA 26 (L) >60 ml/min/1.73m2    GFR est non-AA 21 (L) >60 ml/min/1.73m2    Calcium 10.0 8.3 - 10.4 MG/DL    Bilirubin, total 0.7 0.2 - 1.1 MG/DL    ALT (SGPT) 23 12 - 65 U/L    AST (SGOT) 37 15 - 37 U/L    Alk.  phosphatase 44 (L) 50 - 136 U/L    Protein, total 9.4 (H) 6.3 - 8.2 g/dL    Albumin 4.4 3.2 - 4.6 g/dL    Globulin 5.0 (H) 2.3 - 3.5 g/dL    A-G Ratio 0.9 (L) 1.2 - 3.5     LIPASE    Collection Time: 06/13/17  8:50 PM   Result Value Ref Range    Lipase 432 (H) 73 - 393 U/L   POC LACTIC ACID    Collection Time: 06/13/17  8:57 PM   Result Value Ref Range    Lactic Acid (POC) 2.4 (H) 0.5 - 1.9 mmol/L   POC TROPONIN-I    Collection Time: 06/13/17  9:03 PM   Result Value Ref Range    Troponin-I (POC) 0.02 0.0 - 0.08 ng/ml   POC LACTIC ACID    Collection Time: 06/14/17 12:08 AM   Result Value Ref Range    Lactic Acid (POC) 1.4 0.5 - 1.9 mmol/L   METABOLIC PANEL, COMPREHENSIVE    Collection Time: 06/14/17  7:20 AM   Result Value Ref Range    Sodium 144 136 - 145 mmol/L    Potassium 5.1 3.5 - 5.1 mmol/L    Chloride 109 (H) 98 - 107 mmol/L    CO2 27 21 - 32 mmol/L Anion gap 8 7 - 16 mmol/L    Glucose 93 65 - 100 mg/dL    BUN 39 (H) 8 - 23 MG/DL    Creatinine 1.93 (H) 0.6 - 1.0 MG/DL    GFR est AA 31 (L) >60 ml/min/1.73m2    GFR est non-AA 26 (L) >60 ml/min/1.73m2    Calcium 9.2 8.3 - 10.4 MG/DL    Bilirubin, total 0.6 0.2 - 1.1 MG/DL    ALT (SGPT) 21 12 - 65 U/L    AST (SGOT) 25 15 - 37 U/L    Alk. phosphatase 45 (L) 50 - 136 U/L    Protein, total 7.8 6.3 - 8.2 g/dL    Albumin 3.4 3.2 - 4.6 g/dL    Globulin 4.4 (H) 2.3 - 3.5 g/dL    A-G Ratio 0.8 (L) 1.2 - 3.5     MAGNESIUM    Collection Time: 06/14/17  7:20 AM   Result Value Ref Range    Magnesium 2.6 (H) 1.8 - 2.4 mg/dL   PHOSPHORUS    Collection Time: 06/14/17  7:20 AM   Result Value Ref Range    Phosphorus 4.4 (H) 2.3 - 3.7 MG/DL   CBC WITH AUTOMATED DIFF    Collection Time: 06/14/17  7:20 AM   Result Value Ref Range    WBC 8.9 4.3 - 11.1 K/uL    RBC 3.43 (L) 4.05 - 5.25 M/uL    HGB 10.2 (L) 11.7 - 15.4 g/dL    HCT 31.0 (L) 35.8 - 46.3 %    MCV 90.4 79.6 - 97.8 FL    MCH 29.7 26.1 - 32.9 PG    MCHC 32.9 31.4 - 35.0 g/dL    RDW 13.0 11.9 - 14.6 %    PLATELET 737 862 - 344 K/uL    MPV 9.9 (L) 10.8 - 14.1 FL    DF AUTOMATED      NEUTROPHILS 78 43 - 78 %    LYMPHOCYTES 13 13 - 44 %    MONOCYTES 9 4.0 - 12.0 %    EOSINOPHILS 0 (L) 0.5 - 7.8 %    BASOPHILS 0 0.0 - 2.0 %    IMMATURE GRANULOCYTES 0.1 0.0 - 5.0 %    ABS. NEUTROPHILS 6.9 1.7 - 8.2 K/UL    ABS. LYMPHOCYTES 1.1 0.5 - 4.6 K/UL    ABS. MONOCYTES 0.8 0.1 - 1.3 K/UL    ABS. EOSINOPHILS 0.0 0.0 - 0.8 K/UL    ABS. BASOPHILS 0.0 0.0 - 0.2 K/UL    ABS. IMM. GRANS. 0.0 0.0 - 0.5 K/UL       Plan: 1 Admit  2. IVF'S  3. Pain control  4. Anti-emetics  5. Acute abdominal series next day  6. Empiric antibiotics. 7. NG-tube decompression  8. If does not resolve with conservative treatment, then may need surgery.     Adelia Akins MD.

## 2017-06-14 NOTE — PROGRESS NOTES
TRANSFER - IN REPORT:    Verbal report received from Zaid Nichole RN(name) on Shazia Pleitez  being received from ED(unit) for routine progression of care      Report consisted of patients Situation, Background, Assessment and   Recommendations(SBAR). Information from the following report(s) SBAR, Kardex, ED Summary, MAR, Accordion and Recent Results was reviewed with the receiving nurse. Opportunity for questions and clarification was provided. Assessment completed upon patients arrival to unit and care assumed.

## 2017-06-14 NOTE — H&P
HOSPITALIST H&P/CONSULT  NAME:  Barbara Hawkins   Age:  80 y.o.  :   3/13/1924   MRN:   [id number]  PCP: Júnior Mireles MD  Treatment Team: Attending Provider: Chato Colby MD    DNR     CC: Reason for admission is: SBO    HPI:   Patient case was reviewed with the ER provider prior to seeing the patient. Patient is a 80 y.o. female who presents to the ER due to abdominal pain, bloating and n/v.  States this started a few hours ago and the pain is \"bad\". Pt is actually on hospice due to \"colon obstruction\" from last admission because she refused surgery. Family states that she didn't even think about calling hospice first tonight due to level of pain. She reports that pt was about to graduate from hospice anyway. ER w/u shows SBO; pt refused NGT; surgery wanted hospitalist admission. Pt and family still do not want surgery unless life/death situation, then will consider. She is now willing to try the NGT. Pt still having BMs, states last one has been since arrival to hospital.    ROS:  All systems have been reviewed and are negative except as stated in HPI or elsewhere. Past Medical History:   Diagnosis Date    Blindness - both eyes     CKD (chronic kidney disease) stage 3, GFR 30-59 ml/min     baseline creatinine 1.8    Hypertension     Other ill-defined conditions     shingles      Past Surgical History:   Procedure Laterality Date    HX GI      bowel obstruction/resection    HX HEENT      Glaucoma      Social History   Substance Use Topics    Smoking status: Never Smoker    Smokeless tobacco: Never Used    Alcohol use No      Family History   Problem Relation Age of Onset    Hypertension Other        FH Reviewed and non-contributory to admitting diagnosis    No Known Allergies   Prior to Admission Medications   Prescriptions Last Dose Informant Patient Reported? Taking?    amLODIPine (NORVASC) 10 mg tablet Not Taking at Unknown time  No No   Sig: Take 1 Tab by mouth daily. aspirin 81 mg tablet 2017  Yes No   Sig: Take 81 mg by mouth. erythromycin (ILOTYCIN) ophthalmic ointment Not Taking at Unknown time  Yes No   Sig: Administer  to right eye nightly. iron, carbonyl (FEOSOL) 45 mg tab 2017  Yes No   Sig: Take 1 Tab by mouth daily. latanoprost (XALATAN) 0.005 % ophthalmic solution 2017  Yes No   Sig: Administer 1 Drop to both eyes nightly. metoprolol (LOPRESSOR) 50 mg tablet Not Taking at Unknown time  Yes No   Sig: Take 50 mg by mouth two (2) times a day. nepafenac (NEVANAC) 0.1 % ophthalmic suspension Not Taking at Unknown time  Yes No   Sig: Administer 1 Drop to both eyes two (2) times daily as needed for Pain. promethazine (PHENERGAN) 25 mg suppository Not Taking at Unknown time  No No   Sig: Insert 1 Suppository into rectum every four (4) hours as needed for Nausea. timolol (BETIMOL) 0.5 % ophthalmic solution 2017  Yes No   Sig: Administer 1 Drop to both eyes daily. use in affected eye(s)   traMADol (ULTRAM) 50 mg tablet 2017  No No   Sig: Take 1 Tab by mouth every six (6) hours as needed for Pain. Facility-Administered Medications: None         Objective:     Visit Vitals    /83 (BP 1 Location: Left arm, BP Patient Position: At rest)    Pulse 91    Temp 96.9 °F (36.1 °C)    Resp 18    Ht 5' 2\" (1.575 m)    Wt 45.4 kg (100 lb)    SpO2 100%    BMI 18.29 kg/m2      Temp (24hrs), Av.9 °F (36.6 °C), Min:96.9 °F (36.1 °C), Max:98.9 °F (37.2 °C)    Oxygen Therapy  O2 Sat (%): 100 % (17 0142)  Pulse via Oximetry: 89 beats per minute (17 0108)  O2 Device: Room air (17 1905)   Body mass index is 18.29 kg/(m^2). Physical Exam:    General:    WD and WN; thin , No apparent distress. Head:   Normocephalic, without obvious abnormality, atraumatic. Eyes:  Pt blind bilateral  ENT:  Hearing is normal.  Oropharynx is clear with tacky mucous membranes   Resp:    Clear to auscultation bilaterally.   No Wheezing or Rhonchi. Resp are even and unlabored  Heart[de-identified]  Regular rate and rhythm,  no murmur, rub or gallop. No LE edema  Abdomen:   Firm/not rigid, non-tender. ++distended. Bowel sounds absent. hepato-splenomegaly-none noted  Musc/SK: Muscle strength and tone normal and appropriate for age and condition. No cyanosis. No clubbing  Skin:     Texture, turgor normal. No significant rashes or lesions. Neurologic: CN II - XII are grossly intact; Reflexes unobtainable  Psych: Alert and oriented x 4;  Judgement and insight are normal     Data Review:   Recent Results (from the past 24 hour(s))   CBC WITH AUTOMATED DIFF    Collection Time: 06/13/17  8:50 PM   Result Value Ref Range    WBC 11.6 (H) 4.3 - 11.1 K/uL    RBC 3.75 (L) 4.05 - 5.25 M/uL    HGB 11.2 (L) 11.7 - 15.4 g/dL    HCT 34.0 (L) 35.8 - 46.3 %    MCV 90.7 79.6 - 97.8 FL    MCH 29.9 26.1 - 32.9 PG    MCHC 32.9 31.4 - 35.0 g/dL    RDW 13.0 11.9 - 14.6 %    PLATELET 041 369 - 165 K/uL    MPV 10.0 (L) 10.8 - 14.1 FL    DF AUTOMATED      NEUTROPHILS 88 (H) 43 - 78 %    LYMPHOCYTES 7 (L) 13 - 44 %    MONOCYTES 5 4.0 - 12.0 %    EOSINOPHILS 0 (L) 0.5 - 7.8 %    BASOPHILS 0 0.0 - 2.0 %    IMMATURE GRANULOCYTES 0.3 0.0 - 5.0 %    ABS. NEUTROPHILS 10.2 (H) 1.7 - 8.2 K/UL    ABS. LYMPHOCYTES 0.8 0.5 - 4.6 K/UL    ABS. MONOCYTES 0.6 0.1 - 1.3 K/UL    ABS. EOSINOPHILS 0.0 0.0 - 0.8 K/UL    ABS. BASOPHILS 0.0 0.0 - 0.2 K/UL    ABS. IMM.  GRANS. 0.0 0.0 - 0.5 K/UL   METABOLIC PANEL, COMPREHENSIVE    Collection Time: 06/13/17  8:50 PM   Result Value Ref Range    Sodium 141 136 - 145 mmol/L    Potassium 5.8 (H) 3.5 - 5.1 mmol/L    Chloride 103 98 - 107 mmol/L    CO2 27 21 - 32 mmol/L    Anion gap 11 7 - 16 mmol/L    Glucose 156 (H) 65 - 100 mg/dL    BUN 48 (H) 8 - 23 MG/DL    Creatinine 2.28 (H) 0.6 - 1.0 MG/DL    GFR est AA 26 (L) >60 ml/min/1.73m2    GFR est non-AA 21 (L) >60 ml/min/1.73m2    Calcium 10.0 8.3 - 10.4 MG/DL    Bilirubin, total 0.7 0.2 - 1.1 MG/DL ALT (SGPT) 23 12 - 65 U/L    AST (SGOT) 37 15 - 37 U/L    Alk. phosphatase 44 (L) 50 - 136 U/L    Protein, total 9.4 (H) 6.3 - 8.2 g/dL    Albumin 4.4 3.2 - 4.6 g/dL    Globulin 5.0 (H) 2.3 - 3.5 g/dL    A-G Ratio 0.9 (L) 1.2 - 3.5     LIPASE    Collection Time: 06/13/17  8:50 PM   Result Value Ref Range    Lipase 432 (H) 73 - 393 U/L   POC LACTIC ACID    Collection Time: 06/13/17  8:57 PM   Result Value Ref Range    Lactic Acid (POC) 2.4 (H) 0.5 - 1.9 mmol/L   POC TROPONIN-I    Collection Time: 06/13/17  9:03 PM   Result Value Ref Range    Troponin-I (POC) 0.02 0.0 - 0.08 ng/ml   POC LACTIC ACID    Collection Time: 06/14/17 12:08 AM   Result Value Ref Range    Lactic Acid (POC) 1.4 0.5 - 1.9 mmol/L     CXR Results  (Last 48 hours)    None        CT Results  (Last 48 hours)    None          Assessment and Plan: Active Hospital Problems    Diagnosis Date Noted    SBO (small bowel obstruction) ([facility] [address] 06/14/2017    Hyperkalemia 08/08/2015    DNR (do not resuscitate) 05/16/2013    HTN (hypertension) 05/02/2013         · PLAN   · IVF  · NGT if we can  · Consult surgery  · Cont appropriate home meds (see MAR)  · Control symptoms (pain, n/v, fever, etc)  · Monitor appropriate labs   · Plans discussed with patient and/or caregiver; questions answered.   Pt to remain DNR    Med records reviewed if applicable; findings:     Critical care time if applicable:      Signed By: Candido Brooke MD     June 14, 2017

## 2017-06-14 NOTE — PROGRESS NOTES
Problem: Interdisciplinary Rounds  Goal: Interdisciplinary Rounds  Interdisciplinary team rounds were held 6/14/2017 with the following team members:Care Management, Nursing, Nutrition, Pharmacy and Physician and the patient and child(roopa). Plan of care discussed. See clinical pathway and/or care plan for interventions and desired outcomes.

## 2017-06-14 NOTE — PROGRESS NOTES
Chaplain Hatfield Susan received call from family last night asking for  visit.  talked with family for a long period of time and prayer given. Anand Muir followed and patient was sleeping but gave prayer. Víctor Carreno M.Div.

## 2017-06-14 NOTE — PROGRESS NOTES
Pt seen and examined    Appreciate surgery input. SBO and bot pt/dsughter dont want surgery. Plan is to continue medical management and see if it works. If worsens then consult for inpt hospice. No bill charged to pt for the encounter.

## 2017-06-14 NOTE — CDMP QUERY
Please clarify if this patient is UNDERWEIGHT with Height 5' 5\", Weight 100 lbs, and BMI 18.29.     Thank you,  Misty Herrera RN, CDS  Compliant Documentation Management Program  [phone]

## 2017-06-14 NOTE — PROGRESS NOTES
Met with pt and her dtr int e room. Pt lives with her dtr. Pt was independent prior to admission with most ADL's however dtr does assist pt with dressing. Pt is current with [facility]. SW will notify Nayeli Christopher when pt is discharged.    Mary Beth Conner

## 2017-06-14 NOTE — PROGRESS NOTES
Admission assessment complete. Alert, oriented x 4. Respirations even and unlabored, no distress noted. IVF infusing without difficulty. Abdomen distended, bowel sounds active in all 4 quadrants. Dual skin assessment completed, no breakdown noted. Oriented to room and surroundings, all questions answered. Denies needs. Daughter at bedside. Aware to call should needs arise.

## 2017-06-14 NOTE — CONSULTS
[address]       Name:  Aye Pugh   MR#:  [id number]   :  1924   Account #:  [de-identified]   Date of Adm:  2017       REASON FOR CONSULTATION: Small-bowel obstruction. HISTORY: This is a 24-year-old female who presented to the   emergency department with abdominal pain, bloating, nausea   and vomiting. The patient is actually on hospice due to a \"colon   obstruction\" from last admission, but she refused surgery. Family states they did not even think about calling hospice   because due to the level of the pain. CAT scan showed small   bowel obstruction. The patient and family do not want surgery   unless it is a life or death situation. She is now willing to   try a nasogastric tube, although she refused one at first. She   is still having bowel movements, last one was prior to   admission. The patient is a poor historian and much of the   information was obtained from the chart. There were no family   members when I saw her the morning of 2017. PAST MEDICAL HISTORY: Chronic kidney disease, blindness,   hypertension. PAST SURGICAL HISTORY: She has had multiple abdominal   operations. She has a midline incision present. SOCIAL HISTORY: She denies tobacco or alcohol use. FAMILY HISTORY: Noncontributory. MEDICATIONS: She takes at home:   1. Norvasc. 2. Erythromycin. 3. Ophthalmic ointment. 4. Feosol. 5. Valtan. 6. Lopressor. 7. Nevanac. 8. Phenergan. 9. Betimol. 10. Ultram.    PHYSICAL EXAMINATION:   VITAL SIGNS: Pulse of 80, BP is 156/69, temperature is 97.7,   weight 100 pounds. GENERAL APPEARANCE: This is an elderly, thin    female with a distended abdomen. She has a nasogastric tube in   place and is located in room 357 at the [address] [place] [address] [place]: Sinus rhythm, 2/6 systolic ejection murmur heard. LUNGS: Clear. No rales, rhonchi, wheezing. ABDOMEN: Soft, tympanitic, mild diffuse tenderness. No rebound,   no guarding, no inguinal or incisional hernias noted. BLOOD WORK: White count today is now 8.9, hemoglobin and   hematocrit 10.2/31, platelet count of 860. Sodium is 144,   potassium 5.1, chloride 109, bicarbonate 27, BUN 39, creatinine   1.93, glucose 93, magnesium 2.6. CT SCAN IMPRESSION:    1. Multiple fluid-filled dilated loops of small bowel consistent   with small bowel obstruction. 2. No evidence of colitis or diverticulitis. 3. There is no large bowel obstruction. 4. Absence of IV contrast limits sensitivity. ASSESSMENT: Small-bowel obstruction. PLAN:   1. Admit. 2. IV fluids. 3. Pain control. 4. Antiemetics. 5. Acute abdominal series on 06/15/2017. 6. Empiric antibiotics. 7. Nasogastric tube for decompression. 8. If it does not resolve with conservative treatment, then may   need surgery. By the way, it is 06/14/2017 and I will be out of town from   06/15/2017 until 06/19/2017 with someone in my office following.         Susana Mayes MD      [facility] / Jannet Modi   D:  06/14/2017   09:23   T:  06/14/2017   09:44   Job #:  [id number]

## 2017-06-14 NOTE — PROGRESS NOTES
NG tube canister replaced. Marked at 900 ml of brown liquid. Placement verified. Pt. Tolerated well.

## 2017-06-14 NOTE — ED PROVIDER NOTES
HPI     Past Medical History:   Diagnosis Date    Blindness - both eyes 1997    CKD (chronic kidney disease) stage 3, GFR 30-59 ml/min     baseline creatinine 1.8    Hypertension     Other ill-defined conditions     shingles       Past Surgical History:   Procedure Laterality Date    HX GI      bowel obstruction/resection    HX HEENT      Glaucoma         Family History:   Problem Relation Age of Onset    Hypertension Other        Social History     Social History    Marital status:      Spouse name: N/A    Number of children: N/A    Years of education: N/A     Occupational History    Not on file. Social History Main Topics    Smoking status: Never Smoker    Smokeless tobacco: Never Used    Alcohol use No    Drug use: No    Sexual activity: Not on file     Other Topics Concern    Not on file     Social History Narrative    Pt lives with her daughter     Pt desires a natural death and would not want a ventilator    DNR. Amanda Velasquez DO                 ALLERGIES: Review of patient's allergies indicates no known allergies. Review of Systems    Vitals:    06/13/17 1905   BP: 148/77   Pulse: (!) 102   Resp: 19   Temp: 98.9 °F (37.2 °C)   SpO2: 98%   Weight: 45.4 kg (100 lb)   Height: 5' 2\" (1.575 m)            Physical Exam     MDM  Number of Diagnoses or Management Options  Diagnosis management comments: Lactic acid elevation noted. Worsened creatinine elevation and low GFR. Concern for potential ischemic bowel secondary to patient's reports of abdominal pain, visible distention. Discussed options for imaging with radiologist recommends oral contrast only initially secondary to patient about elevated creatinine and low GFR. CT abdomen and pelvis pending. 12:17 AM  Note from Dr. Coreas July: CT reveals a small bowel obstruction. I discussed an NG tube with the patient and she does not want one. I will discuss the case with general surgery.     12:25 AM  I spoke with  Karissa Bang from general surgery who asked that I call the hospitalist given some of her other lab abnormalities. 12:27 AM  I discussed the case with the hospitalist who kindly agreed to see the patient.        Amount and/or Complexity of Data Reviewed  Clinical lab tests: ordered and reviewed  Tests in the radiology section of CPT®: ordered and reviewed  Tests in the medicine section of CPT®: ordered and reviewed  Discuss the patient with other providers: yes    Risk of Complications, Morbidity, and/or Mortality  Presenting problems: moderate  Diagnostic procedures: moderate  Management options: moderate      ED Course       Procedures

## 2017-06-15 NOTE — PROGRESS NOTES
AM Assessment. Pt. Is alert and oriented X4. Respirations equal and unlabored. Lungs clear. S1 & S2 auscultated. Abd. Distended. Bowel sounds hypoactive X4. Pt. Has no complaints at this time. Call light within reach and family at bedside.

## 2017-06-15 NOTE — PROGRESS NOTES
Admit Date: 2017    POD * No surgery found *    Procedure:  * No surgery found *   Seeing this patient is Dr. Adelaida vázquez. Subjective:     Patient has no new complaints. NGT bilious out put. No flatus or BM. No significant pain    Objective:     Blood pressure 141/69, pulse 72, temperature 98.8 °F (37.1 °C), resp. rate 14, height 5' 2\" (1.575 m), weight 100 lb (45.4 kg), SpO2 93 %. Temp (24hrs), Av.6 °F (37 °C), Min:98.1 °F (36.7 °C), Max:98.9 °F (37.2 °C)      Physical Exam:  HEART: regular rate and rhythm, ABDOMEN: soft, non-tender. Distended no guarding or rebound. No masses,  no organomegaly, EXTREMITIES:  extremities normal, atraumatic, no cyanosis or edema, no edema    Labs:   Recent Results (from the past 24 hour(s))   METABOLIC PANEL, COMPREHENSIVE    Collection Time: 06/15/17  6:24 AM   Result Value Ref Range    Sodium 149 (H) 136 - 145 mmol/L    Potassium 4.3 3.5 - 5.1 mmol/L    Chloride 114 (H) 98 - 107 mmol/L    CO2 25 21 - 32 mmol/L    Anion gap 10 7 - 16 mmol/L    Glucose 69 65 - 100 mg/dL    BUN 32 (H) 8 - 23 MG/DL    Creatinine 1.73 (H) 0.6 - 1.0 MG/DL    GFR est AA 35 (L) >60 ml/min/1.73m2    GFR est non-AA 29 (L) >60 ml/min/1.73m2    Calcium 8.7 8.3 - 10.4 MG/DL    Bilirubin, total 0.5 0.2 - 1.1 MG/DL    ALT (SGPT) 16 12 - 65 U/L    AST (SGOT) 22 15 - 37 U/L    Alk.  phosphatase 37 (L) 50 - 136 U/L    Protein, total 6.7 6.3 - 8.2 g/dL    Albumin 2.8 (L) 3.2 - 4.6 g/dL    Globulin 3.9 (H) 2.3 - 3.5 g/dL    A-G Ratio 0.7 (L) 1.2 - 3.5     CBC WITH AUTOMATED DIFF    Collection Time: 06/15/17  6:24 AM   Result Value Ref Range    WBC 7.2 4.3 - 11.1 K/uL    RBC 2.83 (L) 4.05 - 5.25 M/uL    HGB 8.3 (L) 11.7 - 15.4 g/dL    HCT 25.9 (L) 35.8 - 46.3 %    MCV 91.5 79.6 - 97.8 FL    MCH 29.3 26.1 - 32.9 PG    MCHC 32.0 31.4 - 35.0 g/dL    RDW 13.3 11.9 - 14.6 %    PLATELET 816 586 - 552 K/uL    MPV 9.7 (L) 10.8 - 14.1 FL    DF AUTOMATED      NEUTROPHILS 67 43 - 78 %    LYMPHOCYTES 21 13 - 44 %    MONOCYTES 11 4.0 - 12.0 %    EOSINOPHILS 1 0.5 - 7.8 %    BASOPHILS 0 0.0 - 2.0 %    IMMATURE GRANULOCYTES 0.1 0.0 - 5.0 %    ABS. NEUTROPHILS 4.8 1.7 - 8.2 K/UL    ABS. LYMPHOCYTES 1.5 0.5 - 4.6 K/UL    ABS. MONOCYTES 0.8 0.1 - 1.3 K/UL    ABS. EOSINOPHILS 0.1 0.0 - 0.8 K/UL    ABS. BASOPHILS 0.0 0.0 - 0.2 K/UL    ABS. IMM. GRANS. 0.0 0.0 - 0.5 K/UL       Data Review images and reports reviewed   Acute abdominal series: 06/15/2017     HISTORY: Follow up bowel obstruction.     An acute abdominal series was performed.      COMPARISON: 01/31/2017. Correlation is made to the CT scan of the abdomen and  pelvis 06/13/2017.     FINDINGS: A nasogastric tube tip overlies the left upper quadrant . There are  several dilated loops of bowel without significant change. There are several  air-fluid levels. There is no free intraperitoneal air. No radiopaque densities  resembling calculi are identified. There is diffuse osteopenia.     A single view the chest was obtained. No prior studies are available for  comparison. The cardiac and mediastinal silhouettes are normal in size and  configuration. The lungs and pleural spaces are clear. The pulmonary vasculature  is within normal limits.     IMPRESSION  IMPRESSION: No appreciable change in dilated bowel loops    Assessment:     Principal Problem:    SBO (small bowel obstruction) ([address] (6/14/2017)    Active Problems:    HTN (hypertension) (5/2/2013)      DNR (do not resuscitate) (5/16/2013)      Hyperkalemia (8/8/2015)      Underweight (6/14/2017)        Plan/Recommendations/Medical Decision Making:     Continue present treatment   No surgical intervention planned. Patient does not want any surgery  I discussed her condition with her daughter this am.   She understands that if her obstruction persists that she may have to consider hospice care due to non resolution. I will continue to follow and update her.   Gay Wing MD

## 2017-06-15 NOTE — PROGRESS NOTES
PM assessment complete. Alert, oriented x 4. Respirations even and unlabored, no distress noted. NGT to LIS in place to right nare. IVF infusing without difficulty. Abdomen soft, bowel sounds active in all 4 quadrants. Denies needs or pain. Family at bedside. All safety measures in place.

## 2017-06-15 NOTE — PROGRESS NOTES
Hospitalist Progress Note    6/15/2017  Admit Date: 2017  8:08 PM   NAME: Quinton Knapp   :  3/13/1924   MRN:  [id number]   Attending: Rody Church MD  PCP:  Stevie Sanchez MD    SUBJECTIVE:   Feels weak, has dark colored fluid in ng tube. Still has abd discomfort, AXR unchanged. Nursing notes and chart reviewed. Review of Systems negative with exception of pertinent positives noted above. PHYSICAL EXAM     Visit Vitals    /70 (BP 1 Location: Right arm, BP Patient Position: At rest;Supine)    Pulse 80    Temp 98.4 °F (36.9 °C)    Resp 16    Ht 5' 2\" (1.575 m)    Wt 45.4 kg (100 lb)    SpO2 99%    BMI 18.29 kg/m2      Temp (24hrs), Av.5 °F (36.9 °C), Min:98.1 °F (36.7 °C), Max:98.8 °F (37.1 °C)    Oxygen Therapy  O2 Sat (%): 99 % (06/15/17 1503)  Pulse via Oximetry: 89 beats per minute (17 0108)  O2 Device: Room air (17 1905)    Intake/Output Summary (Last 24 hours) at 06/15/17 1850  Last data filed at 06/15/17 1748   Gross per 24 hour   Intake             2150 ml   Output             1300 ml   Net              850 ml       General: Mild distress. Sleepy.    Lungs:  CTABL. Heart:  RRR, no murmur, rub, or gallop  Abdomen: Distended, tender, +bs  Extremities: No cyanosis or clubbing. Neurologic:  Follows commands, Moves all extremities. Lethargic. LABS AND STUDIES:  Personally reviewed all labs, meds, and studies for past 24hrs.       ASSESSMENT      Active Hospital Problems    Diagnosis Date Noted    SBO (small bowel obstruction) ([facility] [address] 2017    Underweight 2017    Hyperkalemia 2015    DNR (do not resuscitate) 2013    HTN (hypertension) 2013           PLAN:  · Appreciate surgery, conservative measures, pt and family refusing surgery  · Switch IVF due to hypernatremia and hypoglycemia  · Hospice in am if no improvement  · Daughter agrees    Dispo: pending improvement    DVT ppx:  hsq    Signed By: Rody Church MD     Layla 15, 2017

## 2017-06-16 NOTE — PROGRESS NOTES
Patient resting peacefully in bed. [place] daughter by her side. Provided a supportive presence to family. They were receptive to  presence. Encouraged. Prayer. Patient and daughter were calm when visit ended.   Signed by chaplain Antionette

## 2017-06-16 NOTE — PROGRESS NOTES
Pt assessment completed. Alert and oriented. Lungs CTA even and unlabored. Heart sounds regular. Abdomen distended and tender with active bowel sounds. Iv present and infusing without difficulty. Pt denies pain needs at this time. All needs met will continue to monitor.

## 2017-06-16 NOTE — PROGRESS NOTES
I have discussed the situation with the patient and her daughter. They agree that no surgery will be performed. I will sign off and please call if there are changes.     Kylee Vásquez MD

## 2017-06-16 NOTE — PROGRESS NOTES
César spoke with Md this am who informed pt was using [facility] before coming into hospital and revoking services. Md rec an eval for [facility]. Elba Speaker came to evaluate. Pt's daughter requested another day before transporting her there to give her time to talk with her brother and make plans. Pt was accepted and can transfer tomorrow. Will need ambulance transport and RN requested NG stay in clamped as well as the IV access. RN to call report in am after Hospice RN comes to hospital to eval pt for medical stability for transport. Sw to arrange transport-it is on Will Call.  Sw will cont to follow and assist. Jackeline Nichols

## 2017-06-16 NOTE — PROGRESS NOTES
Hospitalist Progress Note    2017  Admit Date: 2017  8:08 PM   NAME: Rowan Jolly   :  3/13/1924   MRN:  [id number]   Attending: Liz Rodríguez MD  PCP:  Collette Bathe, MD    SUBJECTIVE:   6/15: Feels weak, has dark colored fluid in ng tube. Still has abd discomfort, AXR unchanged. Nursing notes and chart reviewed. Review of Systems negative with exception of pertinent positives noted above. PHYSICAL EXAM     Visit Vitals    /79 (BP 1 Location: Left arm, BP Patient Position: At rest;Supine)    Pulse 81    Temp 96.1 °F (35.6 °C)    Resp 16    Ht 5' 2\" (1.575 m)    Wt 45.4 kg (100 lb)    SpO2 95%    BMI 18.29 kg/m2      Temp (24hrs), Av.8 °F (36.6 °C), Min:96.1 °F (35.6 °C), Max:99.2 °F (37.3 °C)    Oxygen Therapy  O2 Sat (%): 95 % (17 1510)  Pulse via Oximetry: 89 beats per minute (17 0108)  O2 Device: Room air (17 0730)    Intake/Output Summary (Last 24 hours) at 17 1821  Last data filed at 17 1543   Gross per 24 hour   Intake              290 ml   Output             1625 ml   Net            -1335 ml       General: Mild distress. Sleepy.    Lungs:  CTABL. Heart:  RRR, no murmur, rub, or gallop  Abdomen: Distended but softer than before, tender, +bs  Extremities: No cyanosis or clubbing. Neurologic:  Follows commands, Moves all extremities. Lethargic. LABS AND STUDIES:  Personally reviewed all labs, meds, and studies for past 24hrs.       ASSESSMENT      Active Hospital Problems    Diagnosis Date Noted    SBO (small bowel obstruction) ([facility] [address] 2017    Underweight 2017    Hyperkalemia 2015    DNR (do not resuscitate) 2013    HTN (hypertension) 2013           PLAN:  · Appreciate surgery, conservative measures, pt and family refusing surgery  · Switched IVF due to hypernatremia and hypoglycemia  · Hospice house in am  · Daughter agrees    Dispo: pending improvement    DVT ppx:  hsq    Signed By: Liz Rodríguez, MD     June 16, 2017

## 2017-06-17 PROBLEM — K56.50 SMALL BOWEL OBSTRUCTION DUE TO ADHESIONS (HCC): Status: ACTIVE | Noted: 2017-01-01

## 2017-06-17 NOTE — DISCHARGE SUMMARY
Hospitalist Discharge Summary     Patient ID:  Jennifer Duran  [id number]  55 y.o.  3/13/1924  Admit date: 6/13/2017  8:08 PM  Discharge date and time: 6/17/2017  Attending: Rakesh Nam MD  PCP:  Anais Crook MD  Treatment Team: Attending Provider: Rakesh Nam MD; Utilization Review: Hailey English    Principal Diagnosis SBO (small bowel obstruction) ([facility] [address]   Principal Problem:    SBO (small bowel obstruction) ([facility] [address] (6/14/2017)    Active Problems:    HTN (hypertension) (5/2/2013)      DNR (do not resuscitate) (5/16/2013)      Hyperkalemia (8/8/2015)      Underweight (6/14/2017)       HPI:    Patient is a 80 y.o. female who presents to the ER due to abdominal pain, bloating and n/v. States this started a few hours ago and the pain is \"bad\". Pt is actually on hospice due to \"colon obstruction\" from last admission because she refused surgery. Family states that she didn't even think about calling hospice first tonight due to level of pain. She reports that pt was about to graduate from hospice anyway. ER w/u shows SBO; pt refused NGT; surgery wanted hospitalist admission. Pt and family still do not want surgery unless life/death situation, then will consider. She is now willing to try the NGT. Pt still having BMs, states last one has been since arrival to hospital.    Hospital Course:    Pt was seen by surgery and NGT was placed. Imaging showed SBO. Pt continued to have bilious and dark black colored NGT secretions. Both pt and daughter refused surgery, Pt states she would not want surgery even if she dies. Both pt and dsughter agreed for hospice eval. Pt was seen by Hospice house and accepted for inpt hospice. NGT and IV line was left in place per [facility] request.     Plan discussed with pt/family and all are in agreement with the plan. All questions answered. Pt was stable at time of discharge. Follow up as per below.     Significant Diagnostic Imaging:     CT scan:    IMPRESSION:     1. Multiple fluid-filled dilated loops of small bowel consistent with small  bowel obstruction. Etiology may be due to adhesion. No transition point is  identified.     2. No evidence of colitis or diverticulitis. No large bowel obstruction.     3. Absence of IV contrast limits sensitivity. IV contrast was not given due to  depressed renal function. Labs: Results:       Chemistry Recent Labs      06/16/17   0543  06/15/17   0624   GLU  111*  69   NA  147*  149*   K  4.5  4.3   CL  114*  114*   CO2  26  25   BUN  25*  32*   CREA  1.68*  1.73*   CA  8.5  8.7   AGAP  7  10   AP  29*  37*   TP  6.3  6.7   ALB  2.6*  2.8*   GLOB  3.7*  3.9*   AGRAT  0.7*  0.7*      CBC w/Diff Recent Labs      06/16/17   0543  06/15/17   0624   WBC  7.2  7.2   RBC  2.70*  2.83*   HGB  7.9*  8.3*   HCT  24.5*  25.9*   PLT  216  220   GRANS  54  67   LYMPH  33  21   EOS  1  1      Cardiac Enzymes No results for input(s): CPK, CKND1, ROSALIA in the last 72 hours. No lab exists for component: CKRMB, TROIP   Coagulation No results for input(s): PTP, INR, APTT in the last 72 hours. No lab exists for component: INREXT    Last A1c No results found for: HBA1C, HGBE8, YVK6WADC, USM1ORDG   Lipid Panel No results found for: CHOL, CHOLPOCT, CHOLX, CHLST, CHOLV, 527971, HDL, LDL, LDLC, DLDLP, 380453, VLDLC, VLDL, TGLX, TRIGL, TRIGP, TGLPOCT, CHHD, CHHDX   BNP No results for input(s): BNPP in the last 72 hours.    Liver Enzymes Recent Labs      06/16/17   0543   TP  6.3   ALB  2.6*   AP  29*   SGOT  22      Thyroid Studies No results found for: T4, T3U, TSH, TSHEXT         Discharge Exam:  Patient Vitals for the past 24 hrs:   Temp Pulse Resp BP SpO2   06/17/17 0718 97.2 °F (36.2 °C) 72 18 142/70 98 %   06/17/17 0435 99.5 °F (37.5 °C) - - - -   06/17/17 0243 100.1 °F (37.8 °C) 75 16 160/81 95 %   06/16/17 2232 96.7 °F (35.9 °C) 81 20 150/60 97 %   06/16/17 1803 99.7 °F (37.6 °C) 74 16 141/67 95 %   06/16/17 1510 96.1 °F (35.6 °C) 81 16 156/79 95 %     Visit Vitals    /70 (BP 1 Location: Left arm, BP Patient Position: At rest;Head of bed elevated (Comment degrees))    Pulse 72    Temp 97.2 °F (36.2 °C)    Resp 18    Ht 5' 2\" (1.575 m)    Wt 45.4 kg (100 lb)    SpO2 98%    BMI 18.29 kg/m2     General appearance: alert, cooperative, cachectic  Lungs: CTABL  Heart: RRR, no m/r/g  Abdomen: soft, distended, +ve Bowel sounds. Extremities: no cyanosis. Neurologic: Lethargic, unable to assess fully. Disposition: Hospice House  Discharge Condition: Stable  Patient Instructions: Activity as tolerated  Ok for icechips and water  Current Discharge Medication List      CONTINUE these medications which have NOT CHANGED    Details   traMADol (ULTRAM) 50 mg tablet Take 1 Tab by mouth every six (6) hours as needed for Pain. Qty: 30 Tab, Refills: 0      amLODIPine (NORVASC) 10 mg tablet Take 1 Tab by mouth daily. Qty: 30 Tab, Refills: 0      promethazine (PHENERGAN) 25 mg suppository Insert 1 Suppository into rectum every four (4) hours as needed for Nausea. Qty: 15 Suppository, Refills: 0      nepafenac (NEVANAC) 0.1 % ophthalmic suspension Administer 1 Drop to both eyes two (2) times daily as needed for Pain.      metoprolol (LOPRESSOR) 50 mg tablet Take 50 mg by mouth two (2) times a day. iron, carbonyl (FEOSOL) 45 mg tab Take 1 Tab by mouth daily. erythromycin (ILOTYCIN) ophthalmic ointment Administer  to right eye nightly. aspirin 81 mg tablet Take 81 mg by mouth.      latanoprost (XALATAN) 0.005 % ophthalmic solution Administer 1 Drop to both eyes nightly. timolol (BETIMOL) 0.5 % ophthalmic solution Administer 1 Drop to both eyes daily. use in affected eye(s)             Vaccinations:   Pt screened by nursing for pneumococcal and influenza vaccination needs at discharge, will be ordered if needed and pt consented.   Immunization History   Administered Date(s) Administered    TB Skin Test (PPD) Intradermal 05/06/2013, 01/25/2017 Follow-up Information     Follow up With Details MD Joni Camacho [address] [place] [address]  [phone]            Time spent in the discharge process was 35 minutes.       Signed By: Kishan Manjarrez MD     June 17, 2017

## 2017-06-17 NOTE — PROGRESS NOTES
Patient received morphine 2 mg IVP at this time for c/o abdominal pain that she rated between 7 or 8/10.

## 2017-06-17 NOTE — H&P
History and Physical    Patient: Justin Lakhani MRN: [id number]  SSN: [id number]    YOB: 1924  Age: 80 y.o. Sex: female      Subjective:      Justin Lakhani is a 80 y.o. female who presented to the ER on 6/14/17 with recurrent abdominal pain, nausea and vomiting. She has known hx of small bowel obstructions and has required surgical interventions including laparotomy with lysis of adhesions for this. Ct scan demonstrated small bowel obstruction with  fluid filled loops of small bowel. The patient in concert with her daughter has adamantly stated no surgical interventions even though this may claim her life. Her nausea and vomiting have been managed by NGT. Of note, she was also previously on hospice and is now entering her 3rd benefit period. Thus, this note is considered to be documentation of a face to face performed on Ms. Pleitez in anticipation of her appropriateness for hospice and [facility] level of care. Pt will be admitted with dx of SBO for management of pain, nausea and vomiting, anxiety, and family/pt support. Past Medical History:   Diagnosis Date    Blindness - both eyes 1997    CKD (chronic kidney disease) stage 3, GFR 30-59 ml/min     baseline creatinine 1.8    Hypertension     Other ill-defined conditions     shingles     Past Surgical History:   Procedure Laterality Date    HX GI      bowel obstruction/resection    HX HEENT      Glaucoma      Family History   Problem Relation Age of Onset    Hypertension Other      Social History   Substance Use Topics    Smoking status: Never Smoker    Smokeless tobacco: Never Used    Alcohol use No      Prior to Admission medications    Medication Sig Start Date End Date Taking? Authorizing Provider   traMADol (ULTRAM) 50 mg tablet Take 1 Tab by mouth every six (6) hours as needed for Pain. 2/7/17   Yannick Bhat NP   amLODIPine (NORVASC) 10 mg tablet Take 1 Tab by mouth daily.  2/7/17   Yannick Bhat NP promethazine (PHENERGAN) 25 mg suppository Insert 1 Suppository into rectum every four (4) hours as needed for Nausea. 2/7/17   Ina Winn NP   nepafenac (NEVANAC) 0.1 % ophthalmic suspension Administer 1 Drop to both eyes two (2) times daily as needed for Pain. Historical Provider   metoprolol (LOPRESSOR) 50 mg tablet Take 50 mg by mouth two (2) times a day. Historical Provider   iron, carbonyl (FEOSOL) 45 mg tab Take 1 Tab by mouth daily. Historical Provider   erythromycin (ILOTYCIN) ophthalmic ointment Administer  to right eye nightly. Historical Provider   aspirin 81 mg tablet Take 81 mg by mouth. George Hill MD   latanoprost (XALATAN) 0.005 % ophthalmic solution Administer 1 Drop to both eyes nightly. George Hill MD   timolol (BETIMOL) 0.5 % ophthalmic solution Administer 1 Drop to both eyes daily. use in affected eye(s)    George Hill MD        No Known Allergies    Review of Systems:  A comprehensive review of systems was negative except for: Gastrointestinal: positive for change in bowel habits, constipation and passing flatus    Objective:     Vitals:    06/17/17 1503   BP: 166/68   Pulse: 81   Resp: 14   Temp: 97.3 °F (36.3 °C)        Physical Exam:  GENERAL: alert, cooperative, no distress, appears stated age  LUNG: clear to auscultation bilaterally  HEART: regular rate and rhythm, S1, S2 normal, no murmur, click, rub or gallop  ABDOMEN: soft, distended but non-tender. Bowel sounds active x all quadrants. No masses, no organomegaly. NGT to right nare and currently clamped. EXTREMITIES:  extremities normal, atraumatic, no cyanosis or edema  SKIN: Normal with stage II to sacrum. NEUROLOGIC: AOx3. Gait normal. Reflexes and motor strength normal and symmetric. Cranial nerves 2-12 and sensation grossly intact.   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, with chronic kidney disease ICD-10-CM: I13.10  ICD-9-CM: 404.00, 585.9  8/8/2015 Yes        Generalized weakness ICD-10-CM: R53.1  ICD-9-CM: 780.79  8/8/2015 Yes        Dehydration ICD-10-CM: E86.0  ICD-9-CM: 276.51  8/8/2015 Yes        Acute on chronic renal failure [facility]) ICD-10-CM: N17.9, N18.9  ICD-9-CM: 584.9, 585.9  8/7/2015 Yes              Plan:     Current Facility-Administered Medications   Medication Dose Route Frequency    sodium chloride (NS) flush 3 mL  3 mL IntraVENous Q12H    sodium chloride (NS) flush 3 mL  3 mL IntraVENous PRN    morphine injection 2 mg  2 mg SubCUTAneous Q20MIN PRN    Or    morphine injection 2 mg  2 mg IntraVENous Q20MIN PRN    haloperidol (HALDOL) 2 mg/mL oral solution 2 mg  2 mg Oral Q1H PRN    acetaminophen (TYLENOL) suppository 650 mg  650 mg Rectal Q3H PRN    LORazepam (ATIVAN) injection 1 mg  1 mg IntraVENous Q4H PRN    glycopyrrolate (ROBINUL) injection 0.2 mg  0.2 mg IntraVENous Q4H PRN    bisacodyl (DULCOLAX) suppository 10 mg  10 mg Rectal DAILY PRN     1. Pt will be admitted with dx of SBO for management of pain, nausea and vomiting, anxiety, and family/pt support. 2. Pain: PRN morphine    3. Nausea and vomiting: PRN haldol and/or ativan. Will clamp NGT for now. Start clear liquids diet. Start bisacodyl PRN. Monitor. 4. Anxiety: PRN Ativan. 5. Family/pt support: No family at bedside during exam. Plan of care including meds discussed with primary RN. Continue to monitor and palliate symptoms as they arise. Clinical case including meds discussed with Dr. Roxie Jones. PPS 20%.      Signed By: Wolf Mcpherson NP     June 17, 2017

## 2017-06-17 NOTE — IP AVS SNAPSHOT
Current Discharge Medication List  
  
START taking these medications Dose & Instructions Dispensing Information Comments Morning Noon Evening Bedtime  
 bisacodyl 10 mg suppository Commonly known as:  DULCOLAX Your last dose was: Your next dose is:    
   
   
 Dose:  10 mg Insert 10 mg into rectum daily as needed. Quantity:  3 Suppository Refills:  0  
     
   
   
   
  
 * LORazepam 0.5 mg tablet Commonly known as:  ATIVAN Your last dose was: Your next dose is:    
   
   
 Dose:  0.25 mg Take 0.5 Tabs by mouth nightly. Max Daily Amount: 0.25 mg.  
 Quantity:  5 Tab Refills:  0  
     
   
   
   
  
 * LORazepam 0.5 mg tablet Commonly known as:  ATIVAN Your last dose was: Your next dose is:    
   
   
 Dose:  0.25 mg Take 0.5 Tabs by mouth every four (4) hours as needed. Max Daily Amount: 1.5 mg.  
 Quantity:  5 Tab Refills:  0  
     
   
   
   
  
 oxyCODONE IR 5 mg immediate release tablet Commonly known as:  Seldon Breslow Your last dose was: Your next dose is:    
   
   
 Dose:  2.5 mg Take 0.5 Tabs by mouth every four (4) hours as needed (pain, dyspnea, or agonal distress). Max Daily Amount: 15 mg. Indications: HAND WRITTEN SCRIPT GIVEN FOR 10 TABLETS Quantity:  10 Tab Refills:  0  
     
   
   
   
  
 polyethylene glycol 17 gram packet Commonly known as:  Corin Cancel Your last dose was: Your next dose is:    
   
   
 Dose:  17 g  
1 Packet by Per NG tube route daily. Indications: HOME SUPPLY RETURNED. Quantity:  3 Packet Refills:  0  
     
   
   
   
  
 polyvinyl alcohol 1.4 % ophthalmic solution Commonly known as:  Leveda Landau Your last dose was: Your next dose is:    
   
   
 Dose:  1 Drop Administer 1 Drop to both eyes as needed for up to 30 days. Indications: DRY EYE, HOME SUPPLY RETURNED. Quantity:  10 mL Refills:  0 * Notice: This list has 2 medication(s) that are the same as other medications prescribed for you. Read the directions carefully, and ask your doctor or other care provider to review them with you. CONTINUE these medications which have NOT CHANGED Dose & Instructions Dispensing Information Comments Morning Noon Evening Bedtime  
 latanoprost 0.005 % ophthalmic solution Commonly known as:  Fiorella Spencer Your last dose was: Your next dose is:    
   
   
 Dose:  1 Drop Administer 1 Drop to both eyes nightly. Indications: RETURN HOME SUPPLY. Quantity:  1 mL Refills:  0 STOP taking these medications   
 amLODIPine 10 mg tablet Commonly known as:  NORVASC  
   
  
 aspirin 81 mg tablet  
   
  
 erythromycin ophthalmic ointment Commonly known as:  ILOTYCIN  
   
  
 FEOSOL 45 mg Tab Generic drug:  iron, carbonyl  
   
  
 metoprolol tartrate 50 mg tablet Commonly known as:  LOPRESSOR  
   
  
 NEVANAC 0.1 % ophthalmic suspension Generic drug:  nepafenac  
   
  
 promethazine 25 mg suppository Commonly known as:  PHENERGAN  
   
  
 timolol 0.5 % ophthalmic solution Commonly known as:  BETIMOL  
   
  
 traMADol 50 mg tablet Commonly known as:  ULTRAM  
   
  
  
  
Where to Get Your Medications Information on where to get these meds will be given to you by the nurse or doctor. ! Ask your nurse or doctor about these medications  
  bisacodyl 10 mg suppository  
 latanoprost 0.005 % ophthalmic solution LORazepam 0.5 mg tablet LORazepam 0.5 mg tablet  
 oxyCODONE IR 5 mg immediate release tablet  
 polyethylene glycol 17 gram packet  
 polyvinyl alcohol 1.4 % ophthalmic solution

## 2017-06-17 NOTE — PROGRESS NOTES
Shift assessment completed via doc flow sheet. Patient is alert and oriented x 4. Respirations even and unlabored on room air. Lung sounds CTA bilaterally. Heart sounds S1, S2 auscultated and regular. Abdomen less distended  today. Bowel sounds hypoactive but patient stated that she has passed gas. Patient has an NGT to low intermittent suction. Patient is blind in both eyes. IV fluids infusing without difficulty. Patient denies pain and other needs at this time. Bed is locked and in low position. Bed rails x 3. Patient is encouraged to call for assistance. Call light within.

## 2017-06-17 NOTE — PROGRESS NOTES
Pt being picked up by transport now, NGT clamped and IV flushed and saline locked. No distress noted at this time.

## 2017-06-17 NOTE — PROGRESS NOTES
Report called to [address] Will transport when packet is ready. No distress noted at this time.

## 2017-06-17 NOTE — PROGRESS NOTES
Presents via EMS from [address] [place] [address] for GIP admission to room 123. She is unaccompanied by family. She provides ID, bracelet verification and history. She has a 22 ga IV, right forearm, patent. There is a Stage II wound at the to of the gluteal crease. Durable barrier cream applied as per verbal order NP at the bedside. Room air. She is able to provide information when instructed. Cooperative. Smiles easily. Skin warm, dry. NG patent, draining green liquid with some blackish particulate matter. Abd bouncy, somewhat firm. Excellent bowel sounds throughout. She is tender in upper half of abd. She voids per bedpan, in excess of 800 ml clear, almost colorless urine. She anticipates, is independent to roll over and can position herself. NG is clamped at this time. She denies pain, nausea at this time. Tube placement verified by auscultation, aspiration.

## 2017-06-17 NOTE — PROGRESS NOTES
Pt assessment completed. Alert and oriented. Lungs CTA even and unlabored. Heart sounds regular. Abdomen distended and tender with active bowel sounds. Pt is passing gas but no BM yet. Iv present and infusing without difficulty. NG tube present to left nare to low intermittent suctions draining brown liquid drainage. Pt denies pain needs at this time, all needs met will continue to monitor.

## 2017-06-17 NOTE — IP AVS SNAPSHOT
Debbie De Luna 
 
 
 [phone]  [place] 
[id number] Patient: Kalpesh Jolly MRN: [id number] :3/13/1924 You are allergic to the following No active allergies Recent Documentation OB Status Smoking Status Postmenopausal Never Smoker Emergency Contacts Name Discharge Info Relation Home Work Mobile Etienne Orlando  Daughter [21] [phone] About your hospitalization You were admitted on:  2017 You last received care in the:  [facility] You were discharged on:  July 3, 2017 Unit phone number:  [phone] Why you were hospitalized Your primary diagnosis was:  Small Bowel Obstruction Due To Adhesions (Hcc) Your diagnoses also included:  Acute On Chronic Renal Failure (Hcc) Providers Seen During Your Hospitalizations Provider Role Specialty Primary office phone Az Crystal MD Attending Provider Geriatric Medicine [phone] Your Primary Care Physician (PCP) Primary Care Physician Office Phone Office Fax [address] [facility], [place] [address] [phone] Follow-up Information Follow up With Details Comments Contact Info Juve Beyer MD   [facility] [address] [place] [address] 
[phone] Az Crystal MD   [facility] [address]  [address] 
[phone] Current Discharge Medication List  
  
START taking these medications Dose & Instructions Dispensing Information Comments Morning Noon Evening Bedtime  
 bisacodyl 10 mg suppository Commonly known as:  DULCOLAX Your last dose was: Your next dose is:    
   
   
 Dose:  10 mg Insert 10 mg into rectum daily as needed. Quantity:  3 Suppository Refills:  0  
     
   
   
   
  
 * LORazepam 0.5 mg tablet Commonly known as:  ATIVAN Your last dose was: Your next dose is:    
   
   
 Dose:  0.25 mg Take 0.5 Tabs by mouth nightly. Max Daily Amount: 0.25 mg.  
 Quantity:  5 Tab Refills:  0  
     
   
   
   
  
 * LORazepam 0.5 mg tablet Commonly known as:  ATIVAN Your last dose was: Your next dose is:    
   
   
 Dose:  0.25 mg Take 0.5 Tabs by mouth every four (4) hours as needed. Max Daily Amount: 1.5 mg.  
 Quantity:  5 Tab Refills:  0  
     
   
   
   
  
 oxyCODONE IR 5 mg immediate release tablet Commonly known as:  Frank Seats Your last dose was: Your next dose is:    
   
   
 Dose:  2.5 mg Take 0.5 Tabs by mouth every four (4) hours as needed (pain, dyspnea, or agonal distress). Max Daily Amount: 15 mg. Indications: HAND WRITTEN SCRIPT GIVEN FOR 10 TABLETS Quantity:  10 Tab Refills:  0  
     
   
   
   
  
 polyethylene glycol 17 gram packet Commonly known as:  Jules Wan Your last dose was: Your next dose is:    
   
   
 Dose:  17 g  
1 Packet by Per NG tube route daily. Indications: HOME SUPPLY RETURNED. Quantity:  3 Packet Refills:  0  
     
   
   
   
  
 polyvinyl alcohol 1.4 % ophthalmic solution Commonly known as:  Feliciano Malave Your last dose was: Your next dose is:    
   
   
 Dose:  1 Drop Administer 1 Drop to both eyes as needed for up to 30 days. Indications: DRY EYE, HOME SUPPLY RETURNED. Quantity:  10 mL Refills:  0  
     
   
   
   
  
 * Notice: This list has 2 medication(s) that are the same as other medications prescribed for you. Read the directions carefully, and ask your doctor or other care provider to review them with you. CONTINUE these medications which have NOT CHANGED Dose & Instructions Dispensing Information Comments Morning Noon Evening Bedtime  
 latanoprost 0.005 % ophthalmic solution Commonly known as:  Taylerdonal Machado Your last dose was: Your next dose is:    
   
   
 Dose:  1 Drop Administer 1 Drop to both eyes nightly. Indications: RETURN HOME SUPPLY. Quantity:  1 mL Refills:  0 STOP taking these medications   
 amLODIPine 10 mg tablet Commonly known as:  NORVASC  
   
  
 aspirin 81 mg tablet  
   
  
 erythromycin ophthalmic ointment Commonly known as:  ILOTYCIN  
   
  
 FEOSOL 45 mg Tab Generic drug:  iron, carbonyl  
   
  
 metoprolol tartrate 50 mg tablet Commonly known as:  LOPRESSOR  
   
  
 NEVANAC 0.1 % ophthalmic suspension Generic drug:  nepafenac  
   
  
 promethazine 25 mg suppository Commonly known as:  PHENERGAN  
   
  
 timolol 0.5 % ophthalmic solution Commonly known as:  BETIMOL  
   
  
 traMADol 50 mg tablet Commonly known as:  ULTRAM  
   
  
  
  
Where to Get Your Medications Information on where to get these meds will be given to you by the nurse or doctor. ! Ask your nurse or doctor about these medications  
  bisacodyl 10 mg suppository  
 latanoprost 0.005 % ophthalmic solution LORazepam 0.5 mg tablet LORazepam 0.5 mg tablet  
 oxyCODONE IR 5 mg immediate release tablet  
 polyethylene glycol 17 gram packet  
 polyvinyl alcohol 1.4 % ophthalmic solution Discharge Instructions SEE DC SUMMARY. Discharge Orders None ACO Transitions of Care Introducing [facility] [address] offers a voluntary care coordination program to provide high quality service and care to [facility] fee-for-service beneficiaries. Rajeev Lindsay was designed to help you enhance your health and well-being through the following services: ? Transitions of Care  support for individuals who are transitioning from one care setting to another (example: Hospital to home). ?  Chronic and Complex Care Coordination  support for individuals and caregivers of those with serious or chronic illnesses or with more than one chronic (ongoing) condition and those who take a number of different medications. If you meet specific medical criteria, a [facility] [address] may call you directly to coordinate your care with your primary care physician and your other care providers. For questions about the [facility] programs, please, contact your physicians office. For general questions or additional information about Accountable Care Organizations: 
Please visit www.medicare.gov/acos. html or call 1-800-MEDICARE (1-623.382.7778) TTY users should call 8-964.301.4469. [employer]zloop Announcement We are excited to announce that we are making your provider's discharge notes available to you in Kidzloop. You will see these notes when they are completed and signed by the physician that discharged you from your recent hospital stay. If you have any questions or concerns about any information you see in Kidzloop, please call the Health Information Department where you were seen or reach out to your Primary Care Provider for more information about your plan of care. Introducing hospitals & HEALTH SERVICES! Kimmy Downs introduces Kidzloop patient portal. Now you can access parts of your medical record, email your doctor's office, and request medication refills online. 1. In your internet browser, go to [email or web address] 2. Click on the First Time User? Click Here link in the Sign In box. You will see the New Member Sign Up page. 3. Enter your Kidzloop Access Code exactly as it appears below. You will not need to use this code after youve completed the sign-up process. If you do not sign up before the expiration date, you must request a new code. · Kidzloop Access Code: [email or web address] Expires: 9/11/2017  6:52 PM 
 
4. Enter the last four digits of your Social Security Number (xxxx) and Date of Birth (mm/dd/yyyy) as indicated and click Submit. You will be taken to the next sign-up page. 5. Create a AimWith ID. This will be your AimWith login ID and cannot be changed, so think of one that is secure and easy to remember. 6. Create a AimWith password. You can change your password at any time. 7. Enter your Password Reset Question and Answer. This can be used at a later time if you forget your password. 8. Enter your e-mail address. You will receive e-mail notification when new information is available in [address] 9. Click Sign Up. You can now view and download portions of your medical record. 10. Click the Download Summary menu link to download a portable copy of your medical information. If you have questions, please visit the Frequently Asked Questions section of the AimWith website. Remember, AimWith is NOT to be used for urgent needs. For medical emergencies, dial 911. Now available from your iPhone and Android! General Information Please provide this summary of care documentation to your next provider. Patient Signature:  ____________________________________________________________ Date:  ____________________________________________________________  
  
[facility] Provider Signature:  ____________________________________________________________ Date:  ____________________________________________________________

## 2017-06-17 NOTE — PROGRESS NOTES
Vd 500 ml clear, almost colorless urine via bedpan. She anticipates, assists and follows commands easily while conversing. She denies pain, nausea  We discuss her nasal tube and that it's important that I know if she has nausea, or feels bloated, full or has pain. She verbalizes understanding. Family at the bedside.

## 2017-06-18 NOTE — PROGRESS NOTES
Received report from night shift RN. Patient sleeping. Identified by name and date of birth on armband. No s/sx pain, agitation, dyspnea, or N/V. Bed low and locked, side rails x2, tab alerts on, call light within reach, and door open for continuous monitoring. Family at bedside.

## 2017-06-18 NOTE — PROGRESS NOTES
Patient c/o abdominal discomfort. Contacted provider and received verbal order with read-back to modify order for low imtermittment suction.

## 2017-06-18 NOTE — PROGRESS NOTES
Summary Note- Patient rested quietly most of shift. Required IV PRN medication for pain/agiation. Tolerating PO intake without any dififculty swallowing; clear diet. Continent or bladder; bedpan; no bowel movement this shift (hypoactive bowel sounds). Consulted with NP and was advised to hold PRN bisacodyl; NP will re-evaluate in the morning. Obtained order to set NGT to low intermittent wall suction for pain/abdominal discomfort as needed; 150ml brown, liquid output. Patient safety maintained through hourly rounding: bed low and locked, side rails x2, tab alerts on, call light within reach, and door open for continuous monitoring.

## 2017-06-18 NOTE — PROGRESS NOTES
Assumed care from off going RN. Walking rounds done with report. Pt I'd by name and . Pt calm. No distress. No facial grimace. Denies pain,nausea,anxiety or distress. Respirations easy and unlabored. NGT clamped at this time. Greeted family/pt. SR up x2. Bed low/locked. Call light with in reach. Door opened. Tab alerts on.

## 2017-06-19 NOTE — [facility] IDG SOCIAL WORKER NOTES
Patient: Phillip Ly    Date: 06/19/17  Time: 1:00 PM    [facility] [place]  Notes  LMSW completed initial assessment. Daughter has been caring for the pt for 17 years at home. Pt is blind.         Signed by: Jeb Hammer

## 2017-06-19 NOTE — PROGRESS NOTES
Medicated for continued abd pain. She is anxious, restless. Repositioned. Family remains at the bedside. Skin warm, dry. Abd unchanged.

## 2017-06-19 NOTE — PROGRESS NOTES
Progress Note    Patient: Camilla Gross MRN: [id number]  SSN: [id number]    YOB: 1924  Age: 80 y.o. Sex: female      Admit Date: 6/17/2017    LOS: 2 days     Subjective:     Patient reports that she remains quite comfortable at this time. I discuss with her yesterday's events of increased abdominal pain and nausea and she reports, \"well that was yesterday\". She is interested in sips of clear fluid when offered and reports it is frustrating that she hasn't had a BM yet. I again ask her if she would want surgical interventions to possible correct this and that failure to do so might claim her life and she again tells me adamantly that she is 80years old and doesn't want surgery. She denies needs at this time and tells me she sometimes doesn't sleep at night when she is at the hospital and we discuss ways to assist her in her sleep. No family at bedside. Review of Systems:  Pertinent items are noted in the History of Present Illness. Objective:     Vitals:    06/17/17 1503 06/18/17 0508 06/18/17 1450 06/19/17 0454   BP: 166/68 192/80 153/72 157/70   Pulse: 81 72 82 79   Resp: 14 14 16 16   Temp: 97.3 °F (36.3 °C) 96.2 °F (35.7 °C) 96.3 °F (35.7 °C) 97.7 °F (36.5 °C)        Intake and Output:  Current Shift:    Last three shifts: 06/17 1901 - 06/19 0700  In: -   Out: 350 [Urine:200]    Physical Exam:  GENERAL: alert, cooperative, no distress, appears stated age  LUNG: clear to auscultation bilaterally  HEART: regular rate and rhythm, S1, S2 normal, no murmur, click, rub or gallop  ABDOMEN: more firm than previous assessment, distended but non-tender except on deep palpation. Bowel sounds high picthed and very infrequent to all quadrants. No masses, no organomegaly. NGT to right nare and currently to LIWS draining dark greenish brown output. EXTREMITIES: extremities normal, atraumatic, no cyanosis or edema  SKIN: Normal with stage II to sacrum. NEUROLOGIC: A+O. Bedboundl.  Reflexes and motor strength normal and symmetric. Cranial nerves 2-12 and sensation grossly intact. PSYCHIATRIC: non focal    Lab/Data Review:  No new labs resulted in the last 24 hours. 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, with chronic kidney disease (8/8/2015)      Generalized weakness (8/8/2015)      Dehydration (8/8/2015)      Nausea (1/25/2017)      [facility] bowel [facility]ions ([facility] (6/17/2017)        Plan:     Current Facility-Administered Medications   Medication Dose Route Frequency    LORazepam (ATIVAN) injection 0.5 mg  0.5 mg IntraVENous Q4H PRN    haloperidol lactate (HALDOL) injection 2 mg  2 mg IntraVENous Q1H PRN    sodium chloride (NS) flush 3 mL  3 mL IntraVENous Q12H    sodium chloride (NS) flush 3 mL  3 mL IntraVENous PRN    morphine injection 2 mg  2 mg SubCUTAneous Q20MIN PRN    Or    morphine injection 2 mg  2 mg IntraVENous Q20MIN PRN    acetaminophen (TYLENOL) suppository 650 mg  650 mg Rectal Q3H PRN    glycopyrrolate (ROBINUL) injection 0.2 mg  0.2 mg IntraVENous Q4H PRN    bisacodyl (DULCOLAX) suppository 10 mg  10 mg Rectal DAILY PRN     1. Pt admitted GIP with dx of SBO for management of pain, nausea and vomiting, anxiety, and family/pt support.      2. Pain: PRN morphine     3. Nausea and vomiting: PRN haldol and/or ativan. Will clamp NGT for now and allow for LIWS. Continue PRN clear liquids diet. Start bisacodyl PRN. Monitor.      4. Anxiety: PRN Ativan. Discussed with nursing staff to consider at HS to improve sleep. Decreased dose to 0.5 mg.     5. Family/pt support: No family at bedside during exam. Plan of care including meds discussed with primary RN. Continue to monitor and palliate symptoms as they arise. Clinical case including meds discussed with Dr. Juan Luis Montoya. PPS 20%.      Signed By: Dee Hanna GEETHA Pineda     June 19, 2017

## 2017-06-19 NOTE — PROGRESS NOTES
She denies pain. LIWS continues. Pt c/o fullness to her abd, which remains firm, distended. She denies pain or nausea. Family member at the bedside reports her to be \"mostly just sleeping\". RR 16,e asy. Skin warm, dry. Repositioned as much as she will allow.

## 2017-06-19 NOTE — HSPC IDG MASTER NOTE
Hospice Interdisciplinary Group Collaborative  Date: 06/19/17  Time: 14:00 PM    ___________________    Patient: Alisia Araiza          ___________________    Diagnoses: There were no encounter diagnoses. Current Medications:    Current Facility-Administered Medications:     haloperidol lactate (HALDOL) injection 2 mg, 2 mg, IntraVENous, Q1H PRN, Omar Emerson NP, 2 mg at 06/19/17 1226    sodium chloride (NS) flush 3 mL, 3 mL, IntraVENous, Q12H, Omar Emerson, NP, 3 mL at 06/19/17 0903    sodium chloride (NS) flush 3 mL, 3 mL, IntraVENous, PRN, Omar Emerson NP, 3 mL at 06/18/17 1325    morphine injection 2 mg, 2 mg, SubCUTAneous, Q20MIN PRN, 2 mg at 06/19/17 1345 **OR** morphine injection 2 mg, 2 mg, IntraVENous, Q20MIN PRN, Omar Emerson NP, 2 mg at 06/19/17 1226    acetaminophen (TYLENOL) suppository 650 mg, 650 mg, Rectal, Q3H PRN, Omar Emerson NP    LORazepam (ATIVAN) injection 1 mg, 1 mg, IntraVENous, Q4H PRN, Omar Emerson NP, 1 mg at 06/19/17 1348    glycopyrrolate (ROBINUL) injection 0.2 mg, 0.2 mg, IntraVENous, Q4H PRN, Omar Emerson NP    bisacodyl (DULCOLAX) suppository 10 mg, 10 mg, Rectal, DAILY PRN, Omar Emerson NP    Orders:  Orders Placed This Encounter    DIET CLEAR LIQUID     Standing Status:   Standing     Number of Occurrences:   1     Order Specific Question:   Likes/Dislikes/Preferences     Answer:   ICE CHIPS    VITAL SIGNS     Standing Status:   Standing     Number of Occurrences:   1    VITAL SIGNS     Standing Status:   Standing     Number of Occurrences:   1    NURSING-MISCELLANEOUS: Comfort Care Measures CONTINUOUS     Standing Status:   Standing     Number of Occurrences:   1     Order Specific Question:   Description of Order:     Answer:   Comfort Care Measures    SONI CATHETER, CARE     1. Soni Catheter care every shift and PRN  2.  Notify Physician of Soni Catheter leakage, occlusion, gross adherent sediment or accidental removal  3. Change Armando 30 days after insertion. Standing Status:   Standing     Number of Occurrences:   1    BLADDER CHECKS     May scan bladder PRN for urinary retention and or patient discomfort     Standing Status:   Standing     Number of Occurrences:   1    NURSING ASSESSMENT:  SPECIFY Assess for GIP, routine, or respite level of care. Q SHIFT Routine     Standing Status:   Standing     Number of Occurrences:   1     Order Specific Question:   Please describe the test or procedure you would like to order. Answer:   Assess for GIP, routine, or respite level of care.  PAIN ASSESSMENT Pain and Symptoms: Assess ever 4 hours and PRN, for GIP level of care. PRN Routine     Standing Status:   Standing     Number of Occurrences:   1     Order Specific Question:   Please describe the test or procedure you would like to order. Answer:   Pain and Symptoms: Assess ever 4 hours and PRN, for GIP level of care.  BEDREST, COMPLETE     Standing Status:   Standing     Number of Occurrences:   1    NURSING-MISCELLANEOUS: Will initiate bowel meds as pt with active bowel sounds x all quadrants. CONTINUOUS     Standing Status:   Standing     Number of Occurrences:   1     Order Specific Question:   Description of Order:     Answer: Will initiate bowel meds as pt with active bowel sounds x all quadrants.  WOUND CARE, DRESSING CHANGE     Wound Care:  Location: Sacrum  Decubitus Wounds Stage II - Cleanse wound location with wound cleanser, pat dry and apply Cavilon Durable Barrier Cream every 12 hours and PRN if soiled. Turn every 2 hours. Assess with each nursing assessment visit. Standing Status:   Standing     Number of Occurrences:   34    NURSING-MISCELLANEOUS: Verbal CTI/Narrative Hospice diagnosis; small bowel obstruction, intra peritoneal adhesions This is an order to admit to inpatient hospice care for a third hospice benefit interval, a 80year-old woman with a history of expe. .. Hospice diagnosis; small bowel obstruction, intra peritoneal adhesions     This is an order to admit to inpatient hospice care for a third hospice benefit interval, a 80year-old woman with a history of experiencing bowel obstructions on numerous occasions  in recent past. Has had laparotomy for lysis of adhesions prior to 2015. The present occasion  6/14/ 2017 patient presented with abdominal pain, nausea and  vomiting. CT scan demonstrated  small bowel obstruction with  fluid filled loops of small bowel. Patient has permitted placement of nasogastric tube to control vomiting and continues to drain copious amounts of gastric aspirate. The patient who is blind and somewhat infirmed has  plainly stated she does not wish for surgical intervention. She excepts the probable lethal event of that decision and she states that she wishes to limit further  care to comfort measures only. Under these circumstances her life expectancy is less than 10 days. She will continue to require anti-emetic, analgesic and anxiolytic medications during her terminal decline. Her alf daughter supports her decision. Glaucoma, blindness, hypertension, chronic renal failure are diagnoses apart from intestinal obstruction that contribute modestly  to her  terminal progress. Standing Status:   Standing     Number of Occurrences:   1     Order Specific Question:   Description of Order:     Answer:   Verbal CTI/Narrative    NURSING-MISCELLANEOUS: NGT Check placement q shift and PRN tube dislodged. May keep NGT clamped. Connect to low intermittent wall suction if pt becomes nauseated or vomits or experiences discomfort/increased pain. Record drainage. CONTINUOUS     Check placement q shift and PRN tube dislodged. May keep NGT clamped. Connect to low intermittent wall suction if pt becomes nauseated or vomits or experiences discomfort/increased pain. Record drainage.      Standing Status:   Standing     Number of Occurrences:   1 Order Specific Question:   Description of Order:     Answer:   NGT    DO NOT RESUSCITATE     Standing Status:   Standing     Number of Occurrences:   1     Order Specific Question:   Comfort Measures Only? Answer: Yes    sodium chloride (NS) flush 3 mL    sodium chloride (NS) flush 3 mL    OR Linked Order Group     morphine injection 2 mg     morphine injection 2 mg    DISCONTD: haloperidol (HALDOL) 2 mg/mL oral solution 2 mg    acetaminophen (TYLENOL) suppository 650 mg    LORazepam (ATIVAN) injection 1 mg    glycopyrrolate (ROBINUL) injection 0.2 mg    bisacodyl (DULCOLAX) suppository 10 mg    haloperidol lactate (HALDOL) injection 2 mg    INITIAL PHYSICIAN ORDER: HOSPICE Level Of Care: General; Reason for Admission: Admit GIP for dx of small bowel obstruction for management of nausea/vomiting, pain, and anxiety     Standing Status:   Standing     Number of Occurrences:   1     Order Specific Question:   Status     Answer:   Hospice     Order Specific Question:   Level Of Care     Answer:   General     Order Specific Question:   Reason for Admission     Answer:   Admit GIP for dx of small bowel obstruction for management of nausea/vomiting, pain, and anxiety     Order Specific Question:   Inpatient Hospitalization Certified Necessary for the Following Reasons     Answer:   3.  Patient receiving treatment that can only be provided in an inpatient setting (further clarification in H&P documentation)     Order Specific Question:   Admitting Diagnosis     Answer:   Small bowel obstruction due to adhesions [facility]) [4994429]     Order Specific Question:   Terminal Prognosis Diagnosis(es)     Answer:   Abdominal adhesions [6786869]     Order Specific Question:   Terminal Prognosis Diagnosis(es)     Answer:   CKD (chronic kidney disease) [5372161]     Order Specific Question:   Terminal Prognosis Diagnosis(es)     Answer:   Nausea & vomiting [130037]     Order Specific Question:   Terminal Prognosis Diagnosis(es)     Answer:   Dehydration [276.51. ICD-9-CM]     Order Specific Question:   Admitting Physician     Answer:   Mikey Manuel [[id number]]     Order Specific Question:   Attending Physician     Answer:   Koren Halsted     Order Specific Question:   Discharge Plan:     Answer: Other (Specify)     Comments:   Anticipate demise at [facility]. Allergies:  No Known Allergies    ___________________    Care Team Notes          POC/IDG Notes      [facility]SP[facility] IDG Volunteer Notes by Santiago Charles at 06/19/17 1406  Version 1 of 1    Author:  Santiago Charles Service:  Teddy Phelan Author Type:  Hospice Volunteer/    Filed:  06/19/17 1406 Date of Service:  06/19/17 1406 Status:  Signed    :  Santiago Charles (Hospice Volunteer/)               [address], Susan. \Hospital Has a New Name and Outlook.\"" Interdisciplinary Plan of Care Review     Status Codes I = Initiated C=Continued R=Revised RS = Resolved     I.  Volunteer     Goal: Hospice house volunteer (s) enhances the quality of remaining life while patient is at the hospice house. Interventions: Aliyah Jeffersonia Billing Volunteer (s) will provide companionship to the patient and/or family by visiting at the hospice house       . Aliyahemilia Watkinsredia Billing Volunteer (s) will provide respite as needed when requested by patient and/or family. Aliyahemilia Easley  Volunteer will provide activities such as music, reading, pet therapy, etc. as requested. Aliyah Chan Asper  Comfort bag delivered. Any other special requests or information regarding volunteer services:    Comfort bag delivered. No further needs identified at this time. These notes have been discussed in [facility] meeting.         Signed by: Socorro PRUITT Nurse Notes by Deyvi Woody RN at 06/19/17 1359  Version 1 of 1    Author:  Deyvi Woody RN Service:  Teddy Phelan Author Type:  Registered Nurse    Filed:  06/19/17 1401 Date of Service:  06/19/17 1359 Status:  Signed    :  Dyevi Woody RN (Registered Nurse)           Patient: Reuben Hernandez    Date: 06/19/17  Time: 1:59 PM    [facility] Nurse Notes    1st IDG:  Pt is 81 y/o female with dx of  Small bowel obstruction. Had active bowel sounds, NG tube clamped and clear liquids started. Had pain after that. Meds given. NG tube now on intermittent low suction. Pt is visually impaired. Abdomen is less distended and soft. No BM. Has desire to take clear liquids. Plan: Pt would like to go home if able, but will continue to monitor for symptom management and optimum comfort. Signed by: Mandie Day, RN       [facility] IDG  Notes by Nilsa Valverde at 06/19/17 1300  Version 1 of 1    Author:  Nilsa Valverde Service:  Cindy Claudio Author Type:      Filed:  06/19/17 1302 Date of Service:  06/19/17 1300 Status:  Signed    :  Nilsa Valverde ()           Patient: Reuben Hernandez    Date: 06/19/17  Time: 1:00 PM    [facility]  Notes  LMSW completed initial assessment. Daughter has been caring for the pt for 17 years at home. Pt is blind.         Signed by: Nilsa Valverde

## 2017-06-19 NOTE — HSP[facility] IDG CHAPLAIN NOTES
Patient: Mark Guillory    Date: 06/19/17  Time: 4:51 PM    [facility]  Notes  Patient admitted on 6/17/17. Initial Spiritual Care Assessment is pending.         Signed by: Lily Rajan

## 2017-06-19 NOTE — HSPC IDG NURSE NOTES
Patient: Collette Ng    Date: 06/19/17  Time: 1:59 PM    [facility] Nurse Notes    1st IDG:  Pt is 81 y/o female with dx of  Small bowel obstruction. Had active bowel sounds, NG tube clamped and clear liquids started. Had pain after that. Meds given. NG tube now on intermittent low suction. Pt is visually impaired. Abdomen is less distended and soft. No BM. Has desire to take clear liquids. Plan: Pt would like to go home if able, but will continue to monitor for symptom management and optimum comfort.          Signed by: Deyvi Woody RN

## 2017-06-19 NOTE — PROGRESS NOTES
She states liquid is backing up into her throat and mouth. She states she has some abd pain but \"not really\" and declines pain medication. .  Abd significantly distended, shiny, taut and firm. BS high pitched and rare, but present with long listening. NG connected to LIWS. Patent and draining green liquid.

## 2017-06-19 NOTE — PROGRESS NOTES
Medicated for abd pain. NG has produced 400 ml clear, brown liquid. She verbalizes a want to drink fluids, but is discouraged at this point as she states she continues to have \"backing up\" of fluids into her mouth and throat.

## 2017-06-19 NOTE — HSPC IDG VOLUNTEER NOTES
[address] Review     Status Codes I = Initiated C=Continued R=Revised RS = Resolved     I.  Volunteer     Goal: Hospice house volunteer (s) enhances the quality of remaining life while patient is at the hospice house. Interventions: Martha Ramos Volunteer (s) will provide companionship to the patient and/or family by visiting at the hospice house       . Martha Ramos Volunteer (s) will provide respite as needed when requested by patient and/or family. Martha Escalera  Volunteer will provide activities such as music, reading, pet therapy, etc. as requested. Martha Escalera  Comfort bag delivered. Any other special requests or information regarding volunteer services:    Comfort bag delivered. No further needs identified at this time. These notes have been discussed in [facility] meeting.         Signed by: Kristina Barrera

## 2017-06-19 NOTE — PROGRESS NOTES
ID, bedside report rec'd. Skin warm, dry. She denies pain, nausea. She is alert, oriented to name, location, circumstance.

## 2017-06-19 NOTE — PROGRESS NOTES
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 Duration of relationship:   Does the spouse/partner function as the primary caregiver? NO    Members of the household  Does the patient live alone: NO  Members of the household  Name Age Relationship Actively Involved in [facility] [address]   Daughter yes                             Notes    Family members/significant others not part of the household  Name Age Relationship Actively Involved in [facility] [address]  Daughter    Demi Skip  daughter    Vinnie Morrell  son                  Notes 2  children  Barry Aparicio and Minal Haney  in  with Cancer      Social Support System: Good social support system which includes two or less willing family members or friends    Abuse/Neglect (actual/potential risks): none    Mental Status: Alert; Oriented to  Person and Place  Does the patient or family have any concerns about the patient's mental status?  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 NO    Has the patient's illness/condition forced a change in the employment status of the spouse or significant other? NO    Patient's annual income level: did not discuss  Has the patient's income status changed as a result of health status: NO  Financial needs: NO  The patient needs the following services: Community resources  Handling finances: Total assistance unable to manage his/her own financial affairs  Financial concerns expressed by patient or spouse/significant other: none  Financial/employment notes: none    Goals/Plans  Goals of hospice care expressed by patient: none  Goals of hospice care expressed by spouse/significant other: comfort    Pain Management  Does the patient/family express or observed signs/symptoms of any pain/issues that need to be reported to the ? NO  If yes, what time was the  notified? End of Life Decisions/Planning: Advanced Directives  Status of plans for /final arrangements: Plans complete  Plans/desires/requests for final arrangements/ communicated with:  home  End of life notes: DNR Gerlean Genera   Remaining life goals:     Survivor Risk Assessment  Indicate the actual and potential risks to the bereavement process: social support systems limited and other Daniela Hanks has been caring for pt for 17 years. Little to no support from siblings)  Risk notes: LOW    Is spiritual well being essential to patient/caregiver? Spiritual notes:  Bia Agosto    Narrative : LMSW visited with the pt and her daughter in the room to complete the  initial assessment. Pt was  for 55 years.   They had 6 children 2 Hugo Marroquin and Mary Beth Timmons)  are now . Pt has Medicaid and Medicare. The daughter Noah Bauer has been caring for her mother for 17 years at her home. Family will be Using Alia Ye . Pt is blind and lost her sight in . Pt loves channel 7 on the tv.            Kerry Keys

## 2017-06-19 NOTE — PROGRESS NOTES
She declines additional medication stating she is comfortable, and denies nausea. Visitor remains. Abd remains firm, quiet distended.

## 2017-06-19 NOTE — PROGRESS NOTES
Summary of night-Pt slept well at long intervals. Voiding in bedpan x2 overnight,no BM\"s. Abdomen less distended than last 24 hours. Has denied pain and nausea all night and has shown no symptoms for NG tube to be unclamped. HOB elevated. Repositioned throughout the night. HOB remains elevated. Pm assessment at start of shift found bowel sounds to be hypoactive. Will continue to monitor and evaluate need for Bisacodyl,NP aware and is following. Has taken small sips of clear liquids overnight without trouble swallowing or reported nausea. Patient safety maintained through hourly rounding: bed low and locked, side rails x2, tab alerts on, call light within reach, and door open for continuous monitoring.

## 2017-06-19 NOTE — PROGRESS NOTES
Assessment, ESAS, fal risk and elin completed at this time. Skin warm, dry. Abd remains somewhat bouncy with minimal distention. NG clamped. BS very diminished, requiring significant time to hear even distant, tinny sounds. She is intolerant of palpation at this time. . Pain RUQ. All considerations for level III fall risk initiated/continue. SRs up x 2. Call bell within reach. x__Assess for environmental safety  _x_Ensure frequently used items are in reach of the patient  _x_Place bed in lowest position with wheel locked  __Check footwear for slip resistance and proper fit  _x_Assess effect to patient with medication changes, diuretic and narcotic use  x__Assess need for equipment: bedside commode, urinal, bedpan, etc.  x__Assess and managed pain and symptoms  _x_Assess patients cognition and ability to follow instructions. [place] patient to surroundings.   x__Educate patient and family related to safety techniques  _x_Provide lighting at all times  __Use floor mats while patient in bed  x__Use tab or bed alarm at all times  _x_Position patient for comfort  _x_Assess fall trends  x__Assess for need of physical therapy  _x_Assess need of volunteer or family to sit with patient  x__Evaluate need for change in medications and other treatments  x__Encourage family to provide close supervision while transferring, ambulation, toileting, etc and to request assistance as needed  _x_Assess the need for patient to be close to nurses station  _x_Leave patient door open while unattended by family or staff  _x_Educate patient / family of no restraint policy and interventions to promote safety

## 2017-06-20 NOTE — PROGRESS NOTES
Initial assessment:    Situation:  Patient resting quietly. No family present during encounter this day. Two family members present during brief encounter previous evening (6/19/17), and staff reports family present earlier in this day (6/20). Background:  Patient is a 80year old AA woman with dx of small bowel obstruction. Daughter, Ju Hickman, is listed as emergency contact. Assessment/Response:  Patient awake and able to converse, although just a bit groggy.  introduced self and offered prayer, and patient expressed belief that \"everybody needs prayer! \"    Plan of care:  Spiritual/emotional support to be provided as needed, requested, or referred.

## 2017-06-21 NOTE — PROGRESS NOTES
Progress Note    Patient: Rosalinda Chapman MRN: [id number]  SSN: [id number]    YOB: 1924  Age: 80 y.o. Sex: female      Admit Date: 6/17/2017    LOS: 4 days     Subjective:     Patient reports that she is interested in sips of clear fluid. She denies needs at this time and reports that she is comfortable. Per nursing, she has asked to be placed on the bed pan twice to have BM but without results. NGT has been connected throughout the day and no nausea or vomiting reported. No family at bedside. Review of Systems:  Pertinent items are noted in the History of Present Illness. Objective:     Vitals:    06/20/17 0547 06/20/17 1806 06/21/17 0419 06/21/17 1644   BP: 180/82 102/65 138/68 171/78   Pulse: 92 98 91 92   Resp: 16 20 16 17   Temp: 96.8 °F (36 °C) 98.1 °F (36.7 °C) 97.4 °F (36.3 °C) 96.5 °F (35.8 °C)        Intake and Output:  Current Shift:    Last three shifts: 06/19 1901 - 06/21 0700  In: 120 [P.O.:120]  Out: 53     Physical Exam:  GENERAL: alert, cooperative, no distress, appears stated age  LUNG: clear to auscultation bilaterally  HEART: regular rate and rhythm, S1, S2 normal, no murmur, click, rub or gallop  ABDOMEN: Remain moderately firm but less distended than previous assessment. Non-tender except on deep palpation. Bowel sounds less high pitched and frequent to all quadrants. No masses, no organomegaly. NGT to right nare and currently to LIWS draining dark greenish brown output. EXTREMITIES: extremities normal, atraumatic, no cyanosis or edema  SKIN: Normal with stage II to sacrum. NEUROLOGIC: A+O. Bedboundl. Reflexes and motor strength normal and symmetric. Cranial nerves 2-12 and sensation grossly intact. PSYCHIATRIC: non focal    Lab/Data Review:  No new labs resulted in the last 24 hours.     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, with chronic kidney disease (8/8/2015)      Generalized weakness (8/8/2015)      Dehydration (8/8/2015)      Nausea (1/25/2017)      Small bowel obstruction due to adhesions ([facility] [address] (6/17/2017)        Plan:     Current Facility-Administered Medications   Medication Dose Route Frequency    LORazepam (ATIVAN) injection 0.5 mg  0.5 mg IntraVENous QHS    Or    LORazepam (ATIVAN) tablet 0.5 mg  0.5 mg Oral QHS    polyvinyl alcohol (LIQUIFILM TEARS) 1.4 % ophthalmic solution 1 Drop  1 Drop Both Eyes PRN    latanoprost (XALATAN) 0.005 % ophthalmic solution 1 Drop  1 Drop Both Eyes QHS    LORazepam (ATIVAN) injection 0.5 mg  0.5 mg IntraVENous Q4H PRN    haloperidol lactate (HALDOL) injection 2 mg  2 mg IntraVENous Q1H PRN    sodium chloride (NS) flush 3 mL  3 mL IntraVENous Q12H    sodium chloride (NS) flush 3 mL  3 mL IntraVENous PRN    morphine injection 2 mg  2 mg SubCUTAneous Q20MIN PRN    Or    morphine injection 2 mg  2 mg IntraVENous Q20MIN PRN    acetaminophen (TYLENOL) suppository 650 mg  650 mg Rectal Q3H PRN    glycopyrrolate (ROBINUL) injection 0.2 mg  0.2 mg IntraVENous Q4H PRN    bisacodyl (DULCOLAX) suppository 10 mg  10 mg Rectal DAILY PRN     1. Pt admitted GIP with dx of SBO for management of pain, nausea and vomiting, anxiety, and family/pt support.      2. Pain: PRN morphine     3. Nausea and vomiting: PRN haldol and/or ativan. NGT to LIWS and clamping PRN. Continue PRN clear liquids diet. Continue bisacodyl PRN. Monitor. BM x 1 documented and pt with sensation to go today x 2.      4. Anxiety: PRN Ativan and scheduled QHS. Discussed with nursing staff used to improve sleep.      5. Family/pt support: No family at bedside during exam. Plan of care including meds discussed with primary RN. Continue to monitor and palliate symptoms as they arise. Clinical case including meds discussed with Dr. Juan Luis Montoya. PPS 20%.      Signed By: Cristian Newby NP     June 21, 2017

## 2017-06-21 NOTE — PROGRESS NOTES
Full bed bath completed with linen and gown changed. Lotion massage and fingernail care completed. Pt requested bedpan to attempt BM but was unsuccessful. Pt did urinate. No pain stated at this time although pain med was offered prior to all the repositioning. Pt comfortable at this time.

## 2017-06-21 NOTE — PROGRESS NOTES
Assumed care of patient from AM RN. Bedside reporting. Patient's daughter at bedside. Patient denies needs and is resting comfortably. Daughter also denied needs. NG tube set to INT suctioning. Patient on RA.   Luigi Grover

## 2017-06-21 NOTE — PROGRESS NOTES
Patient had a very restful night with brother Rica Andrade at bedside throughout the night. Patient had no complaints of pain, discomfort or distress other than being tired of the NG tube. Patient was educated on its purpose and explained that it is helping control her nausea which she understood. No PRNs required this shift.   Patient resting comfortably this AM.  Mamadou Rich

## 2017-06-21 NOTE — PROGRESS NOTES
Assumed care of patient from AM RN. Bedside report. No family or visitors present at this time. Patient resting comfortably with eyes closed. Respirations even and nonlabored.   Tennille Catalan

## 2017-06-22 NOTE — PROGRESS NOTES
Summary Note- Patient rested quietly this shift. Did not require any PRN medications. Denies pain. Tolerating small amounts of ordered diet without any difficulty swallowing. Continent of bladder; bowel movements x3 this shift; enema administered.  Patient safety maintained through hourly rounding: bed low and locked, side rails x2, tab alerts on, call light within reach, and door open for continuous monitoring

## 2017-06-22 NOTE — PROGRESS NOTES
Patient is presently sleeping comfortably with her brother at the bedside. During this shift, patient has complained on numerous occasions of constipation and related pain; she asked for help \"getting it out\". Initially, a suppository was inserted, but this did not bring relief. Approximately 2 hours later, patient called for assistance again. Assessed the rectum to find a large mass of fecal impaction, which I was unable to break up and remove. Charge nurse assisted in removing \"some\" of this mass. Patient continued to call for more help, and complained of pain due to her constipated condition with the added soreness of digital fecal removal.  Despite the new order for Ativan to help patient rest, this condition was keeping her from getting rest.  Morphine 2mg IV provided the needed relief of pain with the added benefit of allowing patient to rest.  Aside from the small amount of fecal material removed by CN, patient has still not produced a BM this shift.   Rajan Foster

## 2017-06-22 NOTE — PROGRESS NOTES
Progress Note    Patient: Nicol Fisher MRN: [id number]  SSN: [id number]    YOB: 1924  Age: 80 y.o. Sex: female      Admit Date: 6/17/2017    LOS: 5 days     Clinical Summary:     Mrs. Mark Cornelius is resting comfortably now. Her abdomen remains quite distended and tympanitic with no other masses. Tenderness and rebound is not elicited. There remains continuous brownish drainage from her NG tube. Her lungs are moderately congested and her vital signs as outlined. I reviewed her history with her daughter and prior episodes of suspected small bowel and obstruction have been treated without surgery and ultimate complete resolution. Constipation has not been a chronic problem. She appeared to be cost that yesterday and was manually disimpacted by the nurse with digital and bisacodyl with only modest results. 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 I have reviewed the situation with the patient's daughter. I recommended a moderately aggressive treatment of her presumed constipation whether this be primary or secondary to her problem. I indicated to the daughter that this may have no benefit but it is low risk and wants to try. Remainder of her treatment is unchanged present time.   If her bowels do not improve within the next few days allowing for rehydration then death will likely occur within this next week.           Current Facility-Administered Medications   Medication Dose Route Frequency    sodium phosphate (FLEET'S) enema 118 mL  1 Enema Rectal NOW    LORazepam (ATIVAN) injection 0.5 mg  0.5 mg IntraVENous QHS    Or    LORazepam (ATIVAN) tablet 0.5 mg  0.5 mg Oral QHS    polyvinyl alcohol (LIQUIFILM TEARS) 1.4 % ophthalmic solution 1 Drop  1 Drop Both Eyes PRN    latanoprost (XALATAN) 0.005 % ophthalmic solution 1 Drop  1 Drop Both Eyes QHS    LORazepam (ATIVAN) injection 0.5 mg  0.5 mg IntraVENous Q4H PRN    haloperidol lactate (HALDOL) injection 2 mg  2 mg IntraVENous Q1H PRN    sodium chloride (NS) flush 3 mL  3 mL IntraVENous Q12H    sodium chloride (NS) flush 3 mL  3 mL IntraVENous PRN    morphine injection 2 mg  2 mg SubCUTAneous Q20MIN PRN    Or    morphine injection 2 mg  2 mg IntraVENous Q20MIN PRN    acetaminophen (TYLENOL) suppository 650 mg  650 mg Rectal Q3H PRN    glycopyrrolate (ROBINUL) injection 0.2 mg  0.2 mg IntraVENous Q4H PRN    bisacodyl (DULCOLAX) suppository 10 mg  10 mg Rectal DAILY PRN           Signed By: Magdaleno Leon MD     June 22, 2017

## 2017-06-23 NOTE — PROGRESS NOTES
Summary Note- Patient rested quietly this shift. Did not require any PRN medications. Denies pain. Tolerating PO intake without any difficulty swallowing. Continent of bladder; no bowel movement this shift. Patient safety maintained through hourly rounding: bed low and locked, side rails x2, tab alerts on, call light within reach, and door open for continuous monitoring. Daughter at bedside.

## 2017-06-23 NOTE — PROGRESS NOTES
Progress Note    Patient: Justin Lakhani MRN: [id number]  SSN: [id number]    YOB: 1924  Age: 80 y.o. Sex: female      Admit Date: 6/17/2017    LOS: 6 days     Subjective:     Alert and oriented x 3. Taking in sips of liquids. Good results from enema yesterday with 3 large bowel movements. NG tube with no output in the past 24 hours. 50ml in the previous 24 hours. No vomiting or nausea. Has required Morphine x 2 SQ for pain. Family at bedside. Review of Systems:  Pertinent items are noted in the History of Present Illness. Objective:     Vitals:    06/21/17 1644 06/22/17 0445 06/22/17 1614 06/23/17 0529   BP: 171/78 161/76 (!) 159/101 190/80   Pulse: 92 (!) 108 (!) 40 91   Resp: 17 16 16 14   Temp: 96.5 °F (35.8 °C) 97.3 °F (36.3 °C) 97.6 °F (36.4 °C) 97 °F (36.1 °C)        Intake and Output:  Current Shift:    Last three shifts: 06/21 1901 - 06/23 0700  In: 60 [P.O.:60]  Out: -     Physical Exam:   GENERAL: alert, cooperative, no distress, appears stated age  LUNG: Coarse, diminished breath sounds with unlabored respirations. HEART: regular rate and rhythm, S1, S2 normal, no murmur, click, rub or gallop  ABDOMEN: soft, tender and distended. Bowel sounds active. EXTREMITIES:  extremities normal, atraumatic, no cyanosis or edema  SKIN: Normal except Stage 2 wound on sacrum. NEUROLOGIC: Alert and oriented x 3. Able to follow commands. Bedbound. PSYCHIATRIC: non focal    Lab/Data Review:  No new labs resulted in the last 24 hours.     Assessment:     Principal Problem:    Small bowel obstruction due to adhesions ([facility] [address] (6/17/2017)    Active Problems:    Acute on chronic renal failure ([facility]) (8/7/2015)        Plan:     Current Facility-Administered Medications   Medication Dose Route Frequency    haloperidol lactate (HALDOL) injection 2 mg  2 mg SubCUTAneous Q1H PRN    LORazepam (ATIVAN) injection 0.5 mg  0.5 mg IntraMUSCular QHS    Or    LORazepam (ATIVAN) tablet 0.5 mg  0.5 mg Oral QHS    glycopyrrolate (ROBINUL) injection 0.2 mg  0.2 mg SubCUTAneous Q4H PRN    LORazepam (ATIVAN) injection 0.5 mg  0.5 mg IntraMUSCular Q4H PRN    polyvinyl alcohol (LIQUIFILM TEARS) 1.4 % ophthalmic solution 1 Drop  1 Drop Both Eyes PRN    latanoprost (XALATAN) 0.005 % ophthalmic solution 1 Drop  1 Drop Both Eyes QHS    morphine injection 2 mg  2 mg SubCUTAneous Q20MIN PRN    acetaminophen (TYLENOL) suppository 650 mg  650 mg Rectal Q3H PRN    bisacodyl (DULCOLAX) suppository 10 mg  10 mg Rectal DAILY PRN     Admitted GIP with Small bowel obstruction for management of pain and nausea/vomiting. 1. Pain: Morphine as ordered. 2. Nausea/vomiting: Haloperidol and Lorazepam as ordered. Diet as tolerated. NG to LIS. 3. Family/Pt Support: Family at bedside during exam. Medications and plan of care discussed with nursing staff and family. Will continue to monitor for symptoms and adjust medications as needed to maintain patient comfort. PPS 20%. Case discussed with Dr. Cleo Shaikh and in [facility] meeting today. No changes in plan of care.     Signed By: Surinder Becerril NP     June 23, 2017 18

## 2017-06-23 NOTE — [facility] IDG VOLUNTEER NOTES
[address] Review     Status Codes I = Initiated C=Continued R=Revised RS = Resolved     C. Volunteer     Goal: Hospice house volunteer (s) enhances the quality of remaining life while patient is at the hospice house. Interventions: Quang Meza  Volunteer (s) will provide companionship to the patient and/or family by visiting at the hospice house       . Quang Francis Volunteer (s) will provide respite as needed when requested by patient and/or family. Quang Hearn  Volunteer will provide activities such as music, reading, pet therapy, etc. as requested. Quang Hearn  Comfort bag delivered. Any other special requests or information regarding volunteer services: Three visits for comfort bag delivery , prayer, and volunteer nursing assistance are recorded. No further needs identified at this time. These notes have been discussed in [facility] meeting.       Signed by: [employer]

## 2017-06-23 NOTE — [facility] IDG NURSE NOTES
Patient: Mara Tobias    Date: 06/23/17  Time: 12:58 PM    [facility] Nurse Notes  F/U: Pt is 81 y/o female with small bowel obstruction. Had to be disimpacted and had good results with enema. IV infiltrated. Meds given SQ. Morphine x 2 for pain. Taking sips only. Not eating. NG has had no output in past 24 hours. Plan: Leave NG in place as is. Monitor for symptom management and optimal comfort.          Signed by: Janneth Bustamante RN

## 2017-06-23 NOTE — [facility] IDG CHAPLAIN NOTES
Patient: Nicol Fisher    Date: 06/23/17  Time: 4:33 PM    [facility]  Notes  Patient admitted on 6/17 and initial spiritual assessment completed on 6/20/17. Plan of care is for spiritual/emotional support to continue to be available as needed, requested, or referred.         Signed by: Gregorio Mast

## 2017-06-23 NOTE — [facility] IDG MASTER NOTE
Hospice Interdisciplinary Group Collaborative  Date: 06/23/17  Time: 1400 pm    ___________________    Patient: Pretty Payment          ___________________    Diagnoses:  Diagnoses of Chronic kidney disease, stage III (moderate), Dehydration, Small bowel obstruction due to adhesions [facility]), and Lower urinary tract infectious disease were pertinent to this visit.     Current Medications:    Current Facility-Administered Medications:     haloperidol lactate (HALDOL) injection 2 mg, 2 mg, SubCUTAneous, Q1H PRN, Angela Bello NP    LORazepam (ATIVAN) injection 0.5 mg, 0.5 mg, IntraMUSCular, QHS **OR** LORazepam (ATIVAN) tablet 0.5 mg, 0.5 mg, Oral, QHS, Angela Bello NP    glycopyrrolate (ROBINUL) injection 0.2 mg, 0.2 mg, SubCUTAneous, Q4H PRN, Angela Bello NP    LORazepam (ATIVAN) injection 0.5 mg, 0.5 mg, IntraMUSCular, Q4H PRN, Angela Bello NP    polyvinyl alcohol (LIQUIFILM TEARS) 1.4 % ophthalmic solution 1 Drop, 1 Drop, Both Eyes, PRN, Isaak Moseley NP, 1 Drop at 06/20/17 1133    latanoprost (XALATAN) 0.005 % ophthalmic solution 1 Drop, 1 Drop, Both Eyes, QHS, Isaak Moseley NP, 1 Drop at 06/22/17 2100    morphine injection 2 mg, 2 mg, SubCUTAneous, Q20MIN PRN, 2 mg at 06/19/17 1345 **OR** [DISCONTINUED] morphine injection 2 mg, 2 mg, IntraVENous, Q20MIN PRN, Isaak Moseley NP, 2 mg at 06/22/17 0501    acetaminophen (TYLENOL) suppository 650 mg, 650 mg, Rectal, Q3H PRN, Isaak Moseley NP    bisacodyl (DULCOLAX) suppository 10 mg, 10 mg, Rectal, DAILY PRN, Isaak Moseley NP, 10 mg at 06/21/17 1932    Orders:  Orders Placed This Encounter    DIET CLEAR LIQUID     Standing Status:   Standing     Number of Occurrences:   1     Order Specific Question:   Likes/Dislikes/Preferences     Answer:   ICE CHIPS    VITAL SIGNS     Standing Status:   Standing     Number of Occurrences:   1    VITAL SIGNS     Standing Status:   Standing     Number of Occurrences:   1    NURSING-MISCELLANEOUS: Comfort Care Measures CONTINUOUS     Standing Status:   Standing     Number of Occurrences:   1     Order Specific Question:   Description of Order:     Answer:   Comfort Care Measures    SONI CATHETER, CARE     1. Soni Catheter care every shift and PRN  2. Notify Physician of Soni Catheter leakage, occlusion, gross adherent sediment or accidental removal  3. Change Soni 30 days after insertion. Standing Status:   Standing     Number of Occurrences:   1    BLADDER CHECKS     May scan bladder PRN for urinary retention and or patient discomfort     Standing Status:   Standing     Number of Occurrences:   1    NURSING ASSESSMENT:  SPECIFY Assess for GIP, routine, or respite level of care. Q SHIFT Routine     Standing Status:   Standing     Number of Occurrences:   1     Order Specific Question:   Please describe the test or procedure you would like to order. Answer:   Assess for GIP, routine, or respite level of care.  PAIN ASSESSMENT Pain and Symptoms: Assess ever 4 hours and PRN, for GIP level of care. PRN Routine     Standing Status:   Standing     Number of Occurrences:   1     Order Specific Question:   Please describe the test or procedure you would like to order. Answer:   Pain and Symptoms: Assess ever 4 hours and PRN, for GIP level of care.  BEDREST, COMPLETE     Standing Status:   Standing     Number of Occurrences:   1    NURSING-MISCELLANEOUS: Will initiate bowel meds as pt with active bowel sounds x all quadrants. CONTINUOUS     Standing Status:   Standing     Number of Occurrences:   1     Order Specific Question:   Description of Order:     Answer: Will initiate bowel meds as pt with active bowel sounds x all quadrants.  WOUND CARE, DRESSING CHANGE     Wound Care:  Location: Sacrum  Decubitus Wounds Stage II - Cleanse wound location with wound cleanser, pat dry and apply Cavilon Durable Barrier Cream every 12 hours and PRN if soiled.  Turn every 2 hours. Assess with each nursing assessment visit. Standing Status:   Standing     Number of Occurrences:   34    NURSING-MISCELLANEOUS: Verbal CTI/Narrative Hospice diagnosis; small bowel obstruction, intra peritoneal adhesions This is an order to admit to inpatient hospice care for a third hospice benefit interval, a 80year-old woman with a history of expe. .. Hospice diagnosis; small bowel obstruction, intra peritoneal adhesions     This is an order to admit to inpatient hospice care for a third hospice benefit interval, a 80year-old woman with a history of experiencing bowel obstructions on numerous occasions  in recent past. Has had laparotomy for lysis of adhesions prior to 2015. The present occasion  6/14/ 2017 patient presented with abdominal pain, nausea and  vomiting. CT scan demonstrated  small bowel obstruction with  fluid filled loops of small bowel. Patient has permitted placement of nasogastric tube to control vomiting and continues to drain copious amounts of gastric aspirate. The patient who is blind and somewhat infirmed has  plainly stated she does not wish for surgical intervention. She excepts the probable lethal event of that decision and she states that she wishes to limit further  care to comfort measures only. Under these circumstances her life expectancy is less than 10 days. She will continue to require anti-emetic, analgesic and anxiolytic medications during her terminal decline. Her nursing home daughter supports her decision. Glaucoma, blindness, hypertension, chronic renal failure are diagnoses apart from intestinal obstruction that contribute modestly  to her  terminal progress. Standing Status:   Standing     Number of Occurrences:   1     Order Specific Question:   Description of Order:     Answer:   Verbal CTI/Narrative    NURSING-MISCELLANEOUS: NGT Check placement q shift and PRN tube dislodged. May keep NGT clamped.  Connect to low intermittent wall suction if pt becomes nauseated or vomits or experiences discomfort/increased pain. Record drainage. CONTINUOUS     Check placement q shift and PRN tube dislodged. May keep NGT clamped. Connect to low intermittent wall suction if pt becomes nauseated or vomits or experiences discomfort/increased pain. Record drainage. Standing Status:   Standing     Number of Occurrences:   1     Order Specific Question:   Description of Order:     Answer:   NGT    NURSING-MISCELLANEOUS: TAP-WATER enema if no results from fleets, thanx CONTINUOUS     Standing Status:   Standing     Number of Occurrences:   1     Order Specific Question:   Description of Order:     Answer:   TAP-WATER enema if no results from fleets, thanx    DO NOT RESUSCITATE     Standing Status:   Standing     Number of Occurrences:   1     Order Specific Question:   Comfort Measures Only? Answer: Yes    DISCONTD: sodium chloride (NS) flush 3 mL    DISCONTD: sodium chloride (NS) flush 3 mL    morphine injection 2 mg    DISCONTD: morphine injection 2 mg    DISCONTD: haloperidol (HALDOL) 2 mg/mL oral solution 2 mg    acetaminophen (TYLENOL) suppository 650 mg    DISCONTD: LORazepam (ATIVAN) injection 1 mg    DISCONTD: glycopyrrolate (ROBINUL) injection 0.2 mg    bisacodyl (DULCOLAX) suppository 10 mg    DISCONTD: haloperidol lactate (HALDOL) injection 2 mg    DISCONTD: LORazepam (ATIVAN) injection 0.5 mg    polyvinyl alcohol (LIQUIFILM TEARS) 1.4 % ophthalmic solution 1 Drop    latanoprost (XALATAN) 0.005 % ophthalmic solution 1 Drop     Order Specific Question:   Specific disease being treated with this drug. Answer:   glaucoma     Order Specific Question:   Is this requested medication unique in class, structure or indication     Answer:   No     Order Specific Question:   Reasons for patient to receive nonformulary medication     Answer:   patient home supply.     DISCONTD: LORazepam (ATIVAN) injection 0.5 mg    DISCONTD: LORazepam (ATIVAN) tablet 0.5 mg    sodium phosphate (FLEET'S) enema 118 mL    haloperidol lactate (HALDOL) injection 2 mg    OR Linked Order Group     LORazepam (ATIVAN) injection 0.5 mg     LORazepam (ATIVAN) tablet 0.5 mg    glycopyrrolate (ROBINUL) injection 0.2 mg    LORazepam (ATIVAN) injection 0.5 mg    INITIAL PHYSICIAN ORDER: HOSPICE Level Of Care: General; Reason for Admission: Admit GIP for dx of small bowel obstruction for management of nausea/vomiting, pain, and anxiety     Standing Status:   Standing     Number of Occurrences:   1     Order Specific Question:   Status     Answer:   Hospice     Order Specific Question:   Level Of Care     Answer:   General     Order Specific Question:   Reason for Admission     Answer:   Admit GIP for dx of small bowel obstruction for management of nausea/vomiting, pain, and anxiety     Order Specific Question:   Inpatient Hospitalization Certified Necessary for the Following Reasons     Answer:   3. Patient receiving treatment that can only be provided in an inpatient setting (further clarification in H&P documentation)     Order Specific Question:   Admitting Diagnosis     Answer:   Small bowel obstruction due to adhesions [facility] [8823784]     Order Specific Question:   Terminal Prognosis Diagnosis(es)     Answer:   Abdominal adhesions [2584165]     Order Specific Question:   Terminal Prognosis Diagnosis(es)     Answer:   CKD (chronic kidney disease) [7105166]     Order Specific Question:   Terminal Prognosis Diagnosis(es)     Answer:   Nausea & vomiting [332495]     Order Specific Question:   Terminal Prognosis Diagnosis(es)     Answer:   Dehydration [276.51. ICD-9-CM]     Order Specific Question:   Admitting Physician     Answer:   Mireya Yan [[id number]]     Order Specific Question:   Attending Physician     Answer:   Ramírez Prince     Order Specific Question:   Discharge Plan:     Answer: Other (Specify)     Comments:   Anticipate demise at [facility]. Allergies:  No Known Allergies    ___________________    Care Team Notes          POC/IDG Notes      [facility] IDG  Notes by Dioni Gunderson at 06/23/17 1633  Version 1 of 1    Author:  Dioni Gunderson Service:  HOSPICE Author Type:  Pastoral Care    Filed:  06/23/17 4823 Date of Service:  06/23/17 1633 Status:  Signed    :  Dioni Gunderson (Pastoral Care)           Patient: Arjun Henley    Date: 06/23/17  Time: 4:33 PM    [facility]  Notes  Patient admitted on 6/17 and initial spiritual assessment completed on 6/20/17. Plan of care is for spiritual/emotional support to continue to be available as needed, requested, or referred. Signed by: Dioni Gundersno       [facility] ID[facility] Volunteer Notes by Nasima Lester at 06/23/17 1335  Version 1 of 1    Author:  Nasima Lester Service:  Eduard Negrete Author Type:  Hospice Volunteer/    Filed:  06/23/17 7036 Date of Service:  06/23/17 1335 Status:  Signed    :  Nasima Lester (Hospice Volunteer/)               [facility] Interdisciplinary Plan of Care Review     Status Codes I = Initiated C=Continued R=Revised RS = Resolved     C. Volunteer     Goal: Hospice house volunteer (s) enhances the quality of remaining life while patient is at the hospice house. Interventions: [facility] Christiannejose Valdovinos Volunteer (s) will provide companionship to the patient and/or family by visiting at the hospice house       . [facility] Augustin Bonifacio Volunteer (s) will provide respite as needed when requested by patient and/or family. [facility] Hloly Islas  Volunteer will provide activities such as music, reading, pet therapy, etc. as requested. [facility] Holly Christines  Comfort bag delivered. Any other special requests or information regarding volunteer services: Three visits for comfort bag delivery , prayer, and volunteer nursing assistance are recorded. No further needs identified at this time.       These notes have been discussed in IDG meeting. Signed by: Lady Hansen       [address] [place] [address] Nurse Notes by Phil Houston RN at 06/23/17 1258  Version 1 of 1    Author:  Phil Houston RN Service:  Yoselin Trivedi Author Type:  Registered Nurse    Filed:  06/23/17 1259 Date of Service:  06/23/17 1258 Status:  Signed    :  Phil Houston RN (Registered Nurse)           Patient: Yeison Govea    Date: 06/23/17  Time: 12:58 PM    [facility] Nurse Notes  F/U: Pt is 79 y/o female with small bowel obstruction. Had to be disimpacted and had good results with enema. IV infiltrated. Meds given SQ. Morphine x 2 for pain. Taking sips only. Not eating. NG has had no output in past 24 hours. Plan: Leave NG in place as is. Monitor for symptom management and optimal comfort. Signed by: Phil Houston RN       [facility] IDG  Notes by Ethan Hernandez at 06/23/17 1212  Version 1 of 1    Author:  Ethan Hernandez Service:  Yoselin Trivedi Author Type:      Filed:  06/23/17 1213 Date of Service:  06/23/17 1212 Status:  Signed    :  Ethan Hernandez ()           Patient: Yeison Govea    Date: 06/23/17  Time: 12:12 PM    [facility]  Notes  LMSW will continue to visit to provide emotional support, active listening and community resources as needed        Signed by: Ethan Hernandez       [facility] IDG  Notes by Mack Tristan at 06/19/17 1651  Version 1 of 1    Author:  Mack Tristan Service:  Yoselin Trivedi Author Type:  Pastoral Care    Filed:  06/19/17 1652 Date of Service:  06/19/17 1651 Status:  Signed    :  Mack Tristan (Pastoral Care)           Patient: Yeison Govea    Date: 06/19/17  Time: 4:51 PM    [facility]  Notes  Patient admitted on 6/17/17. Initial Spiritual Care Assessment is pending.         Signed by: Mack Tristan       [facility] IDG Volunteer Notes by Lady Hansen at 06/19/17 1406  Version 1 of 1    Author:  Lady Hansen Service:  HOSPICE Author Type:  Hospice Volunteer/    Filed:  06/19/17 1406 Date of Service:  06/19/17 1406 Status:  Signed    :  Kristina  (Hospice Volunteer/)               [address] [facility] Susan. \[facility] and [facility].\"" Interdisciplinary Plan of Care Review     Status Codes I = Initiated C=Continued R=Revised RS = Resolved     I.  Volunteer     Goal: Hospice house volunteer (s) enhances the quality of remaining life while patient is at the hospice house. Interventions: Martha Kohli Mavismichell Ramos Volunteer (s) will provide companionship to the patient and/or family by visiting at the hospice house       . Martha Ramos Volunteer (s) will provide respite as needed when requested by patient and/or family. Martha Georges Escalera  Volunteer will provide activities such as music, reading, pet therapy, etc. as requested. Martha Escalera  Comfort bag delivered. Any other special requests or information regarding volunteer services:    Comfort bag delivered. No further needs identified at this time. These notes have been discussed in [facility] meeting. Signed by: Kevin Johns IDG Nurse Notes by Lyubov Bender RN at 06/19/17 1231  Version 1 of 1    Author:  Lyubov Bender RN Service:  Leonid Montoya Author Type:  Registered Nurse    Filed:  06/19/17 1401 Date of Service:  06/19/17 4569 Status:  Signed    :  Lyubov Bedner RN (Registered Nurse)           Patient: Andreia Cherry    Date: 06/19/17  Time: 1:59 PM    [facility] Nurse Notes    1st IDG:  Pt is 81 y/o female with dx of  Small bowel obstruction. Had active bowel sounds, NG tube clamped and clear liquids started. Had pain after that. Meds given. NG tube now on intermittent low suction. Pt is visually impaired. Abdomen is less distended and soft. No BM. Has desire to take clear liquids. Plan: Pt would like to go home if able, but will continue to monitor for symptom management and optimum comfort.          Signed by: Lyubov Bender RN       [facility] IDG  Notes by Gary Eason at 06/19/17 1300  Version 1 of 1    Author:  Gary Eason Service:  Lorena Mejia Author Type:      Filed:  06/19/17 5268 Date of Service:  06/19/17 1300 Status:  Signed    :  Gary Eason ()           Patient: Smiley Vieira    Date: 06/19/17  Time: 1:00 PM    [facility]  Notes  LMSW completed initial assessment. Daughter has been caring for the pt for 17 years at home. Pt is blind.         Signed by: Gary Eason

## 2017-06-23 NOTE — PROGRESS NOTES
Patient continues to rest comfortably with eyes closed/sleeping. NG continues at INT. Son, Branden Dozier, at bedside for the night. No distress noted. Patient repositioned for comfort.   Jerica Jones

## 2017-06-23 NOTE — PROGRESS NOTES
Assumed care of patient from AM RN. Bedside reporting, no family at bedside. Patient resting comfortably, respirations even and non labored, on RA. NG to INT suctioning. Patient denies needs.   Rajan Fairly

## 2017-06-23 NOTE — [facility] IDG SOCIAL WORKER NOTES
Patient: Mara Tobias    Date: 06/23/17  Time: 12:12 PM    [facility]  Notes  LMSW will continue to visit to provide emotional support, active listening and community resources as needed        Signed by: Vivek Plasencia

## 2017-06-24 NOTE — PROGRESS NOTES
Progress Note    Patient: Carlos Arambula MRN: [id number]  SSN: [id number]    YOB: 1924  Age: 80 y.o. Sex: female      Admit Date: 6/17/2017    LOS: 7 days     Clinical Summary:     Marietta Cockayne has had a good several days. She has had a large amount of stool passed with an emesis, spontaneously and with digital disimpaction. She feels better and is having no pain. Her abdomen is markedly less distended, less tympanitic, with some bowel sounds and with no palpable masses or stool. The remainder of her examination is unchanged. Vitals:    06/22/17 0445 06/22/17 1614 06/23/17 0529 06/23/17 1808   BP: 161/76 (!) 159/101 190/80 160/74   Pulse: (!) 108 (!) 40 91 96   Resp: 16 16 14 24   Temp: 97.3 °F (36.3 °C) 97.6 °F (36.4 °C) 97 °F (36.1 °C) 98 °F (36.7 °C)            Clinical Assessment:     Principal Problem:    Small bowel obstruction due to adhesions (HCC) (6/17/2017)    Active Problems:    Acute on chronic renal failure ([facility] [address] (8/7/2015)        Treatment Plan:      I have reviewed the patient's Plan of Care with the nursing staff. I reviewed the situation with the patient and her daughter. It is hard to know whether this is a spontaneous resolution of her SBO or rather that this was  A large bowel obstruction due to her obstipation. In any event she appears to be improving and we may succeed in removing her NG tube advancing her diet over the next few days.           Current Facility-Administered Medications   Medication Dose Route Frequency    haloperidol lactate (HALDOL) injection 2 mg  2 mg SubCUTAneous Q1H PRN    LORazepam (ATIVAN) injection 0.5 mg  0.5 mg IntraMUSCular QHS    Or    LORazepam (ATIVAN) tablet 0.5 mg  0.5 mg Oral QHS    glycopyrrolate (ROBINUL) injection 0.2 mg  0.2 mg SubCUTAneous Q4H PRN    LORazepam (ATIVAN) injection 0.5 mg  0.5 mg IntraMUSCular Q4H PRN    polyvinyl alcohol (LIQUIFILM TEARS) 1.4 % ophthalmic solution 1 Drop  1 Drop Both Eyes PRN    latanoprost (XALATAN) 0.005 % ophthalmic solution 1 Drop  1 Drop Both Eyes QHS    morphine injection 2 mg  2 mg SubCUTAneous Q20MIN PRN    acetaminophen (TYLENOL) suppository 650 mg  650 mg Rectal Q3H PRN    bisacodyl (DULCOLAX) suppository 10 mg  10 mg Rectal DAILY PRN           Signed By: Kike Bullock MD     June 24, 2017

## 2017-06-24 NOTE — PROGRESS NOTES
ID, bedside report rec'd. She is awake, denies pain. Skin warm, dry. LIWS to NG.  RR 16, easy. Restful. 810- She assists with assessment, etc.  She anticipates and is cooperative. NG placement check via auscultation and aspiration of gastric contents. Her abd is still bouncy, but not as distended. She denies pain. 0- Dr. Kulwinder Rice in to visit. She is quiet, denies pain or nausea at this time. We discuss her diet and NG clamping. Dr. Kulwinder Rice encourages her to try to eat a little and she is aware of plan to clamp the NG. She is also aware to inform us if she has nausea and/or pain and we will open the tube again to prevent vomiting and ease pain. She verbalizes understanding. 1345- She is awake. NG clamped, no suction at this time. Abd is quite soft, non tender with very hypoactive bowel sounds. She denies pain. Family at the bedside. 1530- Eyes closed. FLACC 0.  RR 16, easy. Skin warm, dry. 1715- She has tolerated approximately 90 ml of clear liquids at this time. NG remains clamped. She denies pain, nausea. She is quiet, but smiles. She assists to use bedpan and voids 250 ml clear, straw urine. I verify that a minimum of hourly rounds have been provided for this patient during this shift. Meds: NO PRNs, no changes  Family/Education: Regarding NG. We discuss tolerance and intervention, and communication with staff     Oral intake:  Minimal clear liquids    New problems/issues:  None  Summary/general care:  Quiet day. She has tolerated approximately 2 hours of NG clamped and 90 ml clear liquids. Alert, conversive but docile. Family at the bedside all day.

## 2017-06-25 NOTE — PROGRESS NOTES
Progress Note    Patient: Kalpesh Jolly MRN: [id number]  SSN: [id number]    YOB: 1924  Age: 80 y.o. Sex: female      Admit Date: 6/17/2017    LOS: 8 days     Clinical Summary:     Name he did well yesterday with clear liquids but this morning she developed some abdominal pain after having just a few sips. The NG tube was pulled up to suction again but only about 100 cc drained. Abdominal exam however reveals her to be markedly distended and tympanitic once again which is a marked worsening from her improvement seen yesterday. Remainder of her examination is unchanged. Vitals:    06/23/17 0529 06/23/17 1808 06/24/17 1705 06/25/17 0547   BP: 190/80 160/74 156/74 158/72   Pulse: 91 96 66 85   Resp: 14 24 19 20   Temp: 97 °F (36.1 °C) 98 °F (36.7 °C) 98.1 °F (36.7 °C)             Clinical Assessment:     Principal Problem:    Small bowel obstruction due to adhesions ([facility]) (6/17/2017)    Active Problems:    Acute on chronic renal failure ([facility]) (8/7/2015)        Treatment Plan:      I have reviewed the patient's Plan of Care with the nursing staff. I reviewed the situation with the patient and her family. We'll keep her on the suction today and  Start on a trial of clamping tomorrow. We'll try another fleets enema. I'm somewhat disappointed since her  Abdominal examination was so markedly improved yesterday after aggressive bowel movements.           Current Facility-Administered Medications   Medication Dose Route Frequency    sodium phosphate (FLEET'S) enema 118 mL  1 Enema Rectal NOW    haloperidol lactate (HALDOL) injection 2 mg  2 mg SubCUTAneous Q1H PRN    LORazepam (ATIVAN) injection 0.5 mg  0.5 mg IntraMUSCular QHS    Or    LORazepam (ATIVAN) tablet 0.5 mg  0.5 mg Oral QHS    glycopyrrolate (ROBINUL) injection 0.2 mg  0.2 mg SubCUTAneous Q4H PRN    LORazepam (ATIVAN) injection 0.5 mg  0.5 mg IntraMUSCular Q4H PRN    polyvinyl alcohol (LIQUIFILM TEARS) 1.4 % ophthalmic solution 1 Drop  1 Drop Both Eyes PRN    latanoprost (XALATAN) 0.005 % ophthalmic solution 1 Drop  1 Drop Both Eyes QHS    morphine injection 2 mg  2 mg SubCUTAneous Q20MIN PRN    acetaminophen (TYLENOL) suppository 650 mg  650 mg Rectal Q3H PRN    bisacodyl (DULCOLAX) suppository 10 mg  10 mg Rectal DAILY PRN           Signed By: Mayra Holcomb MD     June 25, 2017

## 2017-06-25 NOTE — PROGRESS NOTES
ID, bedside report. SKin warm, dry. Eyes closed. Rr 14-16, easy and even. FLACC 0.     825- She takes 100% of her clear liquid diet. We discuss again her NG is off, and that I need to know if she has nausea, and/or pain. She and her family verbalize understanding, deny questions. Eye drops provided for comfort and her daughter states she should be using timolol in the AMs. Advised I will double check with the provider today for direction. Pt anticipates and assists with assessment, etc at this time. She denies pain or nausea. RR 16 easy. Skin warm, dry. Abd is soft, without tenderness on light palpation today. 1100- c/o abd pain, \"bloated\". Denies nausea. SKin cool, dry. She is repositioned after thuy care for incontinence of urine. Her daughter at the bedside is upset that she won't eat. Pt declines anything more than apple juice. LIWS resumed for c/o pain. 1250- Requests pain medication, and then declines it. She denies pain at this time. She denies nausea. LIWS continues. 1320- Large incontinent loose brown stool. She c/o abd cramps, but declines medication. Dr. Long Rodriguez contacted and updated. Orders rec'd. Pt updated on plan. 1550- pt c/o abd cramps. She declines pain medication stating \"it's just cramps\". She has had several loose stools. 1750- Large liquid brown stool. She c/o abd cramping. Repositioned after thuy care. She declines pain medication, denies nausea. Family reports she has been eating jello and juice. I verify that a minimum of hourly rounds have been provided for this patient during this shift. Meds:  No PRNs  Family/Education: We review pain/nausea management with NG tube. Oral intake: Fairly good clear liquids    New problems/issues: Diarrhea    Summary/general care: Much abd cramping and many many stools today, soft unformed, but no hard or particularly loose stools.

## 2017-06-26 NOTE — PROGRESS NOTES
Progress Note    Patient: Antonio Rincon MRN: [id number]  SSN: [id number]    YOB: 1924  Age: 80 y.o. Sex: female      Admit Date: 6/17/2017    LOS: 9 days     Subjective:     Patient reports that she is comfortable at present. She denies needs at this time and reports that she is fine. Per nursing, she has rested well throughout the night. She has been tolerating sips of clear liquids and  NGT has been connected throughout the day and no nausea or vomiting reported. No PRNs in the last 24 hours due to pt refusal of meds for fear of worsening constipation. Family (dtr) at bedside. Review of Systems:  Pertinent items are noted in the History of Present Illness. Objective:     Vitals:    06/24/17 1705 06/25/17 0547 06/25/17 1630 06/26/17 1115   BP: 156/74 158/72 185/82 190/87   Pulse: 66 85 89 73   Resp: 19 20 17 16   Temp: 98.1 °F (36.7 °C)  98.2 °F (36.8 °C) 97 °F (36.1 °C)        Intake and Output:  Current Shift:    Last three shifts:      Physical Exam:  GENERAL: Drowsy, cooperative, no distress, appears stated age  LUNG: clear to auscultation bilaterally  HEART: regular rate and rhythm, S1, S2 normal, no murmur, click, rub or gallop  ABDOMEN: Mildy firm but less distended than previous assessment. Non-tender. Bowel sounds high pitched, high pitched, and frequent to all quadrants. No masses, no organomegaly. NGT to right nare with LIWS. EXTREMITIES: extremities normal, atraumatic, no cyanosis or edema  SKIN: Normal with stage II to sacrum. On air mattress. NEUROLOGIC: A+O. Bedbound. Reflexes and motor strength normal and symmetric. Cranial nerves 2-12 and sensation grossly intact. PSYCHIATRIC: non focal    Lab/Data Review:  No new labs resulted in the last 24 hours.     Assessment:     Principal Problem:    Small bowel obstruction due to adhesions ([facility] [address] (6/17/2017)    Active Problems:    Acute on chronic renal failure ([facility] [address] (8/7/2015)        Plan:     Current Facility-Administered Medications   Medication Dose Route Frequency    haloperidol lactate (HALDOL) injection 2 mg  2 mg SubCUTAneous Q1H PRN    LORazepam (ATIVAN) injection 0.5 mg  0.5 mg IntraMUSCular QHS    Or    LORazepam (ATIVAN) tablet 0.5 mg  0.5 mg Oral QHS    glycopyrrolate (ROBINUL) injection 0.2 mg  0.2 mg SubCUTAneous Q4H PRN    LORazepam (ATIVAN) injection 0.5 mg  0.5 mg IntraMUSCular Q4H PRN    polyvinyl alcohol (LIQUIFILM TEARS) 1.4 % ophthalmic solution 1 Drop  1 Drop Both Eyes PRN    latanoprost (XALATAN) 0.005 % ophthalmic solution 1 Drop  1 Drop Both Eyes QHS    morphine injection 2 mg  2 mg SubCUTAneous Q20MIN PRN    acetaminophen (TYLENOL) suppository 650 mg  650 mg Rectal Q3H PRN    bisacodyl (DULCOLAX) suppository 10 mg  10 mg Rectal DAILY PRN     1. Pt admitted GIP with dx of SBO for management of pain, nausea and vomiting, anxiety, and family/pt support.      2. Pain: PRN morphine     3. Nausea and vomiting: PRN haldol and/or ativan. NGT to LIWS for 2 hours and clamping for 6. Increased diet to PRN full liquids diet. Continue bisacodyl PRN. Provide one time dose of miralax and monitor.      4. Anxiety: PRN Ativan and scheduled QHS. Decreased dosage as pt with increased sleep during the day.       5. Family/pt support: Family at bedside during exam. Plan of care including meds discussed with primary RN and family. Discussed possibility of stabilization if patient is able to move stool through her GI tract and maintain adequate oral intake. Whether this will be possible or not is an ongoing question but that we are working on it and will continue to keep her updated. We discuss additional eye drops that dtr claims were brought in yesterday morning with a yellow top called timolol but are unable to be located at this time. Primary RN investigating where these might be. Continue to monitor and palliate symptoms as they arise. Clinical case including meds discussed with Team during [address] PPS 20%.      Signed By: Jasson Durham NP     June 26, 2017

## 2017-06-26 NOTE — [facility] IDG SOCIAL WORKER NOTES
Patient: Shannan Precise    Date: 06/26/17  Time: 1:18 PM    \[facility]\""  Notes  LMSW will continue to provide emotional support to pt and family.           Signed by: Yonny Peck

## 2017-06-26 NOTE — [facility] IDG NURSE NOTES
Patient: Cecy Gallagher    Date: 06/26/17  Time: 1:18 PM    \[facility]\"" Nurse Notes    UPDATE: Patient is a 80year old Female, with history of bowel obstructions. 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 Per Emy, patient isn't wanting surgery for bowel obstruction. NG tube intact per report. Enema administered yesterday per report, and patient had BM per report. GIP for pain, nausea and vomiting management. Advance to Full liquid diet begun per today's IDG discussion. New order to clamp NG tube for 6 hours at a time, and them NG to suction every 2 hours, then alternate, to prevent abdominal distention. GOALS OF CARE:   Continue to monitor for optimal patient comfort and symptom management. Ongoing family support.           Signed by: Fernando Chavarria RN MSN

## 2017-06-26 NOTE — [facility] IDG CHAPLAIN NOTES
Patient: Pretty Payment    Date: 06/26/17  Time: 1:18 PM    [facility]  Notes    Initial Spiritual Assessment completed by Ana Luisa Ledesma, Sania5 Sade Travis. Patient values prayer and is open to continued support.          Signed by: Long [place]

## 2017-06-26 NOTE — [facility] IDG MASTER NOTE
[facility]osp[facility] Group Collaborative  Date: 06/26/17  Time: 1330 PM    ___________________    Patient: Alisia Araiza        ___________________    Diagnoses:  Diagnoses of Chronic kidney disease, stage III (moderate), Dehydration, Small bowel obstruction due to adhesions [facility]), and Lower urinary tract infectious disease were pertinent to this visit. Current Medications:    Current Facility-Administered Medications:     polyethylene glycol (MIRALAX) packet 17 g, 17 g, Per NG tube, ONCE, Omar Emerson NP    haloperidol lactate (HALDOL) injection 2 mg, 2 mg, SubCUTAneous, Q1H PRN, Yaa Lorenzo NP    LORazepam (ATIVAN) injection 0.5 mg, 0.5 mg, IntraMUSCular, QHS **OR** LORazepam (ATIVAN) tablet 0.5 mg, 0.5 mg, Oral, QHS, Yaa Lorenzo NP, 0.5 mg at 06/25/17 2047    glycopyrrolate (ROBINUL) injection 0.2 mg, 0.2 mg, SubCUTAneous, Q4H PRN, Yaa Lorenzo NP    LORazepam (ATIVAN) injection 0.5 mg, 0.5 mg, IntraMUSCular, Q4H PRN, Yaa Lorenzo NP    polyvinyl alcohol (LIQUIFILM TEARS) 1.4 % ophthalmic solution 1 Drop, 1 Drop, Both Eyes, PRN, Omar Emerson NP, 1 Drop at 06/25/17 0909    latanoprost (XALATAN) 0.005 % ophthalmic solution 1 Drop, 1 Drop, Both Eyes, QHS, Omar Emerson NP, 1 Drop at 06/25/17 2200    morphine injection 2 mg, 2 mg, SubCUTAneous, Q20MIN PRN, 2 mg at 06/19/17 1345 **OR** [DISCONTINUED] morphine injection 2 mg, 2 mg, IntraVENous, Q20MIN PRN, Omar Emerson NP, 2 mg at 06/22/17 0501    acetaminophen (TYLENOL) suppository 650 mg, 650 mg, Rectal, Q3H PRN, Omar Emerson NP    bisacodyl (DULCOLAX) suppository 10 mg, 10 mg, Rectal, DAILY PRN, Omar Emerson, GEETHA, 10 mg at 06/23/17 9445    Orders:  Orders Placed This Encounter    DIET CLEAR LIQUID     Standing Status:   Standing     Number of Occurrences:   1     Order Specific Question:   Likes/Dislikes/Preferences     Answer:   PLEASE ADVANCE DIET TO FULL LIQUIDS.    Venice Morris 53     Standing Status:   Standing     Number of Occurrences:   1    VITAL SIGNS     Standing Status:   Standing     Number of Occurrences:   1    NURSING-MISCELLANEOUS: Comfort Care Measures CONTINUOUS     Standing Status:   Standing     Number of Occurrences:   1     Order Specific Question:   Description of Order:     Answer:   Comfort Care Measures    SONI CATHETER, CARE     1. Soni Catheter care every shift and PRN  2. Notify Physician of Soni Catheter leakage, occlusion, gross adherent sediment or accidental removal  3. Change Soni 30 days after insertion. Standing Status:   Standing     Number of Occurrences:   1    BLADDER CHECKS     May scan bladder PRN for urinary retention and or patient discomfort     Standing Status:   Standing     Number of Occurrences:   1    NURSING ASSESSMENT:  SPECIFY Assess for GIP, routine, or respite level of care. Q SHIFT Routine     Standing Status:   Standing     Number of Occurrences:   1     Order Specific Question:   Please describe the test or procedure you would like to order. Answer:   Assess for GIP, routine, or respite level of care.  PAIN ASSESSMENT Pain and Symptoms: Assess ever 4 hours and PRN, for GIP level of care. PRN Routine     Standing Status:   Standing     Number of Occurrences:   1     Order Specific Question:   Please describe the test or procedure you would like to order. Answer:   Pain and Symptoms: Assess ever 4 hours and PRN, for GIP level of care.  BEDREST, COMPLETE     Standing Status:   Standing     Number of Occurrences:   1    NURSING-MISCELLANEOUS: Will initiate bowel meds as pt with active bowel sounds x all quadrants. CONTINUOUS     Standing Status:   Standing     Number of Occurrences:   1     Order Specific Question:   Description of Order:     Answer: Will initiate bowel meds as pt with active bowel sounds x all quadrants.     WOUND CARE, DRESSING CHANGE     Wound Care:  Location: Sacrum  Decubitus Wounds Stage II - Cleanse wound location with wound cleanser, pat dry and apply Cavilon Durable Barrier Cream every 12 hours and PRN if soiled. Turn every 2 hours. Assess with each nursing assessment visit. Standing Status:   Standing     Number of Occurrences:   34    NURSING-MISCELLANEOUS: Verbal CTI/Narrative Hospice diagnosis; small bowel obstruction, intra peritoneal adhesions This is an order to admit to inpatient hospice care for a third hospice benefit interval, a 80year-old woman with a history of expe. .. Hospice diagnosis; small bowel obstruction, intra peritoneal adhesions     This is an order to admit to inpatient hospice care for a third hospice benefit interval, a 80year-old woman with a history of experiencing bowel obstructions on numerous occasions  in recent past. Has had laparotomy for lysis of adhesions prior to 2015. The present occasion  6/14/ 2017 patient presented with abdominal pain, nausea and  vomiting. CT scan demonstrated  small bowel obstruction with  fluid filled loops of small bowel. Patient has permitted placement of nasogastric tube to control vomiting and continues to drain copious amounts of gastric aspirate. The patient who is blind and somewhat infirmed has  plainly stated she does not wish for surgical intervention. She excepts the probable lethal event of that decision and she states that she wishes to limit further  care to comfort measures only. Under these circumstances her life expectancy is less than 10 days. She will continue to require anti-emetic, analgesic and anxiolytic medications during her terminal decline. Her longterm daughter supports her decision. Glaucoma, blindness, hypertension, chronic renal failure are diagnoses apart from intestinal obstruction that contribute modestly  to her  terminal progress.      Standing Status:   Standing     Number of Occurrences:   1     Order Specific Question:   Description of Order:     Answer:   Verbal CTI/Narrative    NURSING-MISCELLANEOUS: NGT Check placement q shift and PRN tube dislodged. Clamp NGT x 6 hours and connect to LIWS for 2 hours. CONTINUOUS     Check placement q shift and PRN tube dislodged. Clamp NGT x 6 hours and connect to LIWS for 2 hours. Standing Status:   Standing     Number of Occurrences:   1     Order Specific Question:   Description of Order:     Answer:   NGT    DO NOT RESUSCITATE     Standing Status:   Standing     Number of Occurrences:   1     Order Specific Question:   Comfort Measures Only? Answer: Yes    DISCONTD: sodium chloride (NS) flush 3 mL    DISCONTD: sodium chloride (NS) flush 3 mL    morphine injection 2 mg    DISCONTD: morphine injection 2 mg    DISCONTD: haloperidol (HALDOL) 2 mg/mL oral solution 2 mg    acetaminophen (TYLENOL) suppository 650 mg    DISCONTD: LORazepam (ATIVAN) injection 1 mg    DISCONTD: glycopyrrolate (ROBINUL) injection 0.2 mg    bisacodyl (DULCOLAX) suppository 10 mg    DISCONTD: haloperidol lactate (HALDOL) injection 2 mg    DISCONTD: LORazepam (ATIVAN) injection 0.5 mg    polyvinyl alcohol (LIQUIFILM TEARS) 1.4 % ophthalmic solution 1 Drop    latanoprost (XALATAN) 0.005 % ophthalmic solution 1 Drop     Order Specific Question:   Specific disease being treated with this drug. Answer:   glaucoma     Order Specific Question:   Is this requested medication unique in class, structure or indication     Answer:   No     Order Specific Question:   Reasons for patient to receive nonformulary medication     Answer:   patient home supply.     DISCONTD: LORazepam (ATIVAN) injection 0.5 mg    DISCONTD: LORazepam (ATIVAN) tablet 0.5 mg    sodium phosphate (FLEET'S) enema 118 mL    haloperidol lactate (HALDOL) injection 2 mg    OR Linked Order Group     LORazepam (ATIVAN) injection 0.5 mg     LORazepam (ATIVAN) tablet 0.5 mg    glycopyrrolate (ROBINUL) injection 0.2 mg    LORazepam (ATIVAN) injection 0.5 mg    DISCONTD: sodium phosphate (FLEET'S) enema 118 mL    DISCONTD: bisacodyl (DULCOLAX) suppository 10 mg    bisacodyl (DULCOLAX) suppository 10 mg    polyethylene glycol (MIRALAX) packet 17 g     Order Specific Question:   Specific disease being treated with this drug. Answer:   constipation     Order Specific Question:   Is this requested medication unique in class, structure or indication     Answer:   No     Order Specific Question:   Reasons for patient to receive nonformulary medication     Answer:   Pt preferred.  INITIAL PHYSICIAN ORDER: HOSPICE Level Of Care: General; Reason for Admission: Admit GIP for dx of small bowel obstruction for management of nausea/vomiting, pain, and anxiety     Standing Status:   Standing     Number of Occurrences:   1     Order Specific Question:   Status     Answer:   Hospice     Order Specific Question:   Level Of Care     Answer:   General     Order Specific Question:   Reason for Admission     Answer:   Admit GIP for dx of small bowel obstruction for management of nausea/vomiting, pain, and anxiety     Order Specific Question:   Inpatient Hospitalization Certified Necessary for the Following Reasons     Answer:   3. Patient receiving treatment that can only be provided in an inpatient setting (further clarification in H&P documentation)     Order Specific Question:   Admitting Diagnosis     Answer:   Small bowel obstruction due to adhesions [facility]) [6636914]     Order Specific Question:   Terminal Prognosis Diagnosis(es)     Answer:   Abdominal adhesions [9852702]     Order Specific Question:   Terminal Prognosis Diagnosis(es)     Answer:   CKD (chronic kidney disease) [0028365]     Order Specific Question:   Terminal Prognosis Diagnosis(es)     Answer:   Nausea & vomiting [854309]     Order Specific Question:   Terminal Prognosis Diagnosis(es)     Answer:   Dehydration [276.51. ICD-9-CM]     Order Specific Question:   Admitting Physician     Answer:   Judie Lynn [[id number]]     Order Specific Question:   Attending Physician     Answer:   Lynn Worley     Order Specific Question:   Discharge Plan:     Answer: Other (Specify)     Comments:   Anticipate demise at [facility]. Allergies:  No Known Allergies    ___________________    Care Team Notes          POC/IDG Notes      [facility] ID  Notes by Gianfranco Reynoso at 06/26/17 1318  Version 1 of 1    Author:  Gianfranco Reynoso Service:  Jenny Hernandez Author Type:      Filed:  06/26/17 1333 Date of Service:  06/26/17 1318 Status:  Signed    :  Gianfranco Reynoso ()           Patient: Antonio Simpler    Date: 06/26/17  Time: 1:18 PM    [address] [place] [address]  Notes  LMSW will continue to provide emotional support to pt and family. Signed by: Gianfranco Reynoso       [facility]  Notes by Bernadette Smith at 06/26/17 1318  Version 1 of 1    Author:  Bernadette Smith Service:  Spiritual Care Author Type:  Pastoral Care    Filed:  06/26/17 1333 Date of Service:  06/26/17 1318 Status:  Signed    :  Bernadette Smith (Pastoral Care)           Patient: Antonio Simpler    Date: 06/26/17  Time: 1:18 PM    [facility]  Notes    Initial Spiritual Assessment completed by Courtney Ashraf. Patient values prayer and is open to continued support. Signed by: Bernadette Smith       [address] [place] [address] Nurse Notes by Caron Bucio at 06/26/17 1318  Version 1 of 1    Author:  Caron Bucio Service:  Jenny Hernandez Author Type:  Registered Nurse    Filed:  06/26/17 1331 Date of Service:  06/26/17 1318 Status:  Signed    :  Caron Bucio (Registered Nurse)           Patient: Antonio Simpler    Date: 06/26/17  Time: 1:18 PM    [address] [facility] [address] Nurse Notes    UPDATE: Patient is a 80year old Female, with history of bowel obstructions. 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 Per Emy, patient isn't wanting surgery for bowel obstruction. NG tube intact per report.  Enema administered yesterday per report, and patient had BM per report. GIP for pain, nausea and vomiting management. Advance to Full liquid diet begun per today's IDG discussion. New order to clamp NG tube for 6 hours at a time, and them NG to suction every 2 hours, then alternate, to prevent abdominal distention. GOALS OF CARE:   Continue to monitor for optimal patient comfort and symptom management. Ongoing family support. Signed by: Will Mendoza RN MSN       [address] [facility]  Notes by Aramis Lal at 06/23/17 1633  Version 1 of 1    Author:  Aramis Lal Service:  Jossy Bishop Author Type:  Pastoral Care    Filed:  06/23/17 9553 Date of Service:  06/23/17 1633 Status:  Signed    :  Aramis Lal (Pastoral Care)           Patient: Angely Pantoja    Date: 06/23/17  Time: 4:33 PM    [facility]  Notes  Patient admitted on 6/17 and initial spiritual assessment completed on 6/20/17. Plan of care is for spiritual/emotional support to continue to be available as needed, requested, or referred. Signed by: Aramis Lal       [address] [facility] Volunteer Notes by Chelsey Meléndez at 06/23/17 8294  Version 1 of 1    Author:  Chelsey Meléndez Service:  Jossy Bishop Author Type:  Hospice Volunteer/    Filed:  06/23/17 3096 Date of Service:  06/23/17 1335 Status:  Signed    :  Chelsey Meléndez (Hospice Volunteer/)               [address] [facility] Interdisciplinary Plan of Care Review     Status Codes I = Initiated C=Continued R=Revised RS = Resolved     C. Volunteer     Goal: Hospice house volunteer (s) enhances the quality of remaining life while patient is at the hospice house. Interventions: Salvador Crocker Volunteer (s) will provide companionship to the patient and/or family by visiting at the hospice house       . Salvador Crocker Volunteer (s) will provide respite as needed when requested by patient and/or family. Salvador Anne  Volunteer will provide activities such as music, reading, pet therapy, etc. as requested. Larry Barreto  Comfort bag delivered. Any other special requests or information regarding volunteer services: Three visits for comfort bag delivery , prayer, and volunteer nursing assistance are recorded. No further needs identified at this time. These notes have been discussed in 8 [facility] meeting. Signed by: Caterina Vela       [address] [place] [address] Nurse Notes by Jv Al RN at 06/23/17 1258  Version 1 of 1    Author:  Jv Al RN Service:  Yue Side Author Type:  Registered Nurse    Filed:  06/23/17 1259 Date of Service:  06/23/17 1258 Status:  Signed    :  Jv Al RN (Registered Nurse)           Patient: Alisia Araiza    Date: 06/23/17  Time: 12:58 PM    [facility] Nurse Notes  F/U: Pt is 81 y/o female with small bowel obstruction. Had to be disimpacted and had good results with enema. IV infiltrated. Meds given SQ. Morphine x 2 for pain. Taking sips only. Not eating. NG has had no output in past 24 hours. Plan: Leave NG in place as is. Monitor for symptom management and optimal comfort.          Signed by: Jv Al RN       [facility] IDG  Notes by Martínez Nelson at 06/23/17 1212  Version 1 of 1    Author:  Martínez Nelson Service:  [facility] Side Author Type:      Filed:  06/23/17 1213 Date of Service:  06/23/17 1212 Status:  Signed    :  Martínez Nelson ()           Patient: Alisia Araiza    Date: 06/23/17  Time: 12:12 PM    [facility]  Notes  LMSW will continue to visit to provide emotional support, active listening and community resources as needed        Signed by: Martínez Nelson       [facility] IDG  Notes by Danielle Pearl at 06/19/17 1651  Version 1 of 1    Author:  Danielle Pearl Service:  [place] Side Author Type:  Pastoral Care    Filed:  06/19/17 1652 Date of Service:  06/19/17 1651 Status:  Signed    :  Danielle Pearl (Pastoral Care)           Patient: Juan Manuel Due Maik    Date: 06/19/17  Time: 4:51 PM    [facility]  Notes  Patient admitted on 6/17/17. Initial Spiritual Care Assessment is pending. Signed by: Gokul BORJAS [facility] IDG Volunteer Notes by Rebecca Beard at 06/19/17 1406  Version 1 of 1    Author:  Rebecca Beard Service:  Kathie Chow Author Type:  Hospice Volunteer/    Filed:  06/19/17 1406 Date of Service:  06/19/17 1406 Status:  Signed    :  Rebecca Beard (Hospice Volunteer/)               [address] [facility] Interdisciplinary Plan of Care Review     Status Codes I = Initiated C=Continued R=Revised RS = Resolved     I.  Volunteer     Goal: Hospice house volunteer (s) enhances the quality of remaining life while patient is at the hospice house. Interventions: Micky Marreroter Volunteer (s) will provide companionship to the patient and/or family by visiting at the hospice house       . Mickymaldonado Díaz Callie Carla Volunteer (s) will provide respite as needed when requested by patient and/or family. Micky Díazdenia Geiger [place]  Volunteer will provide activities such as music, reading, pet therapy, etc. as requested. Micky Díaz Fely [place]  Comfort bag delivered. Any other special requests or information regarding volunteer services:    Comfort bag delivered. No further needs identified at this time. These notes have been discussed in [facility] meeting. Signed by: Shaila Mcfadden IDG Nurse Notes by Allyson Fernandez RN at 06/19/17 1357  Version 1 of 1    Author:  Allyson Fernandez RN Service:  Kathie Chow Author Type:  Registered Nurse    Filed:  06/19/17 1401 Date of Service:  06/19/17 1359 Status:  Signed    :  Allyson Fernandez RN (Registered Nurse)           Patient: Justin Lakhani    Date: 06/19/17  Time: 1:59 PM    [facility] Nurse Notes    1st IDG:  Pt is 81 y/o female with dx of  Small bowel obstruction. Had active bowel sounds, NG tube clamped and clear liquids started. Had pain after that. Meds given.  NG tube now on intermittent low suction. Pt is visually impaired. Abdomen is less distended and soft. No BM. Has desire to take clear liquids. Plan: Pt would like to go home if able, but will continue to monitor for symptom management and optimum comfort. Signed by: Roger Gant RN       [facility] IDG  Notes by Keaton Melvin at 06/19/17 1300  Version 1 of 1    Author:  Keaton Melvin Service:  Jossy Bishop Author Type:      Filed:  06/19/17 9125 Date of Service:  06/19/17 1300 Status:  Signed    :  Keaton Melvin ()           Patient: Angely Pantoja    Date: 06/19/17  Time: 1:00 PM    [facility]  Notes  LMSW completed initial assessment. Daughter has been caring for the pt for 17 years at home. Pt is blind.         Signed by: Keaton Melvin

## 2017-06-26 NOTE — PROGRESS NOTES
Patient alert and oriented x4. Sleeping increased periods of time through day. Patient eats clear liquid diet. Eating 100% with no nausea. Intermit suction. Pt with no bowel movement today. Bowel sounds hyperactive in all four quads. After review, patient suction clamped for 6 hours. Patient tolerated well with no nausea. Will monitor pt ability to tolerate intake and ability to have bowel movement.

## 2017-06-27 NOTE — PROGRESS NOTES
Pt tolerated clamped NG tube for 6 hrs, and intermittent suction for 2 hrs per order. No complaints of nausea, no vomiting, just has feeling of being full. Ate 100% of lunch and dinner. Daughter feeds pt. Pt requests bedpan and is able to void in the bedpan. No bowel movement during shift, but additional MiraLax administered per order. Pt denies any pain and is resting comfortably at this time.

## 2017-06-27 NOTE — PROGRESS NOTES
Progress Note    Patient: Amilcar Soriano MRN: [id number]  SSN: [id number]    YOB: 1924  Age: 80 y.o. Sex: female      Admit Date: 6/17/2017    LOS: 10 days     Subjective:     Resting quietly. Per nursing, she has rested well throughout the night. She has been tolerating sips of full liquids and NGT has been connected/clamped as ordered. BM after administration of Miralax. No PRNs. No family at bedside. Review of Systems:  Pertinent items are noted in the History of Present Illness. Objective:     Vitals:    06/25/17 1630 06/26/17 1115 06/26/17 1700 06/27/17 0616   BP: 185/82 190/87 (!) 206/103 (!) 174/98   Pulse: 89 73 86 90   Resp: 17 16 17 16   Temp: 98.2 °F (36.8 °C) 97 °F (36.1 °C) 96.2 °F (35.7 °C) 97.1 °F (36.2 °C)        Intake and Output:  Current Shift:    Last three shifts:      Physical Exam:  GENERAL: Drowsy, cooperative, no distress, appears stated age  LUNG: clear to auscultation bilaterally  HEART: regular rate and rhythm, S1, S2 normal, no murmur, click, rub or gallop  ABDOMEN: Softer and less distended than previous assessment. Non-tender. Bowel sounds normoactive to all quadrants. No masses, no organomegaly. NGT to right nare with LIWS/clamped as ordered. EXTREMITIES: extremities normal, atraumatic, no cyanosis or edema  SKIN: Normal with stage II to sacrum. On air mattress. NEUROLOGIC: A+O. Bedbound. Reflexes and motor strength normal and symmetric. Cranial nerves 2-12 and sensation grossly intact. PSYCHIATRIC: non focal    Lab/Data Review:  No new labs resulted in the last 24 hours.     Assessment:     Principal Problem:    Small bowel obstruction due to adhesions ([facility] [address] (6/17/2017)    Active Problems:    Acute on chronic renal failure (HCC) (8/7/2015)        Plan:     Current Facility-Administered Medications   Medication Dose Route Frequency    polyethylene glycol (MIRALAX) packet 17 g  17 g Per NG tube DAILY PRN    LORazepam (ATIVAN) injection 0.26 mg  0.26 mg IntraMUSCular QHS    Or    LORazepam (ATIVAN) tablet 0.25 mg  0.25 mg Oral QHS    haloperidol lactate (HALDOL) injection 2 mg  2 mg SubCUTAneous Q1H PRN    glycopyrrolate (ROBINUL) injection 0.2 mg  0.2 mg SubCUTAneous Q4H PRN    LORazepam (ATIVAN) injection 0.5 mg  0.5 mg IntraMUSCular Q4H PRN    polyvinyl alcohol (LIQUIFILM TEARS) 1.4 % ophthalmic solution 1 Drop  1 Drop Both Eyes PRN    latanoprost (XALATAN) 0.005 % ophthalmic solution 1 Drop  1 Drop Both Eyes QHS    morphine injection 2 mg  2 mg SubCUTAneous Q20MIN PRN    acetaminophen (TYLENOL) suppository 650 mg  650 mg Rectal Q3H PRN    bisacodyl (DULCOLAX) suppository 10 mg  10 mg Rectal DAILY PRN     1. Pt admitted GIP with dx of SBO for management of pain, nausea and vomiting, anxiety, and family/pt support.      2. Pain: PRN morphine     3. Nausea and vomiting: PRN haldol and/or ativan. NGT to LIWS for 2 hours and clamping for 6. Increased diet to PRN full liquids diet. Continue bisacodyl PRN. Added daily PRN dose  of miralax as yesterday produced large BM.     4. Anxiety: PRN Ativan and scheduled QHS. Decreased dosage as pt with increased sleep during the day.       5. Family/pt support: No family at bedside during exam. Plan of care including meds discussed with primary RN and family. No further information from family regarding eye drops. Continue to monitor and palliate symptoms as they arise. Clinical case including meds discussed with Team during [address] PPS 20%.      Signed By: Leila Brower NP     June 27, 2017

## 2017-06-27 NOTE — PROGRESS NOTES
Received report from off going RN and assumed care of patient. Patient identified by name and . Patient resting quietly. No s/sx of distress noted. Bed low and locked. Call light within reach. Door open for monitoring.

## 2017-06-27 NOTE — PROGRESS NOTES
Pt resting quietly in bed. Denies pain and nausea. Began intermittent suctioning again at 0730 and is tolerating well. Pt ate 75% of pudding, and drank 100% of apple juice this morning for breakfast.  Pt alert and expresses needs clearly. Daughter at bedside.

## 2017-06-27 NOTE — PROGRESS NOTES
Situation: Ms. Frieda Stephenson is a 80year old lady who comes to us from [address] [place] [address]. Her primary hospice diagnosis is small bowel obstruction. She is GIP level of care due to pain, nausea and vomiting. Background:   Patient is a . Her   many years. She is a mother. Two of her daughters are . One  at age 25 as a result of a car accident. The second daughter  in January of this year due to breast cancer. Patient has 8 grandchildren and two great grandchildren. Patient's daughter Kristi Godinez has been caring for her for the last 17 years. Patient is blind. Kristi Godinez has a room set up in her home to accomodate her mom's needs. Patient is a Nondenominational. Since being home bound she enjoys listening to Sefaira on the [employer] channel. She especially enjoys the ministry of Dr. Andre Lynn on  at 11 AM. She is open about her lucy and embraces the ministry of prayer. She loves sharing with others how her daughter cares for her. Assessment:    During 's visit patient was awake and alert. Her daughter was sitting at bedside. 's visit began with a conversation with daughter Kristi Godinez. She talked about her mom's strength and survival throughout the last few years. Patient has been in [facility] two other times and managed to improve. Kristi Godinez would bring her mom to her home and when she would get better she wanted to return to her own place. Eventually it became necessary for her to move in with Kristi Godinez. The two of them have managed quite nicely. Kristi Godinez says up until recently her mom was able to walk back and forth in her room to the sofa and bathroom. Kristi Godinez recognized a change when her mom started lying down more frequently. She said that was very unusual for her mom. Eventually the reason was revealed after mom had some discomfort that led to an ER visit.  Patient says she is in God's hands and won't go anywhere until He gets ready for her. She still has a desire for life. She is proud of her children. She especially beams when she talks about how blessed she is to have [facility] for her.  noticed a glimmer in Lisa's eye when her mom bragged on her. Lisa's son is supportive as well. He tends to their yard and calls her frequently offering to assist in whatever way he can. Sugey Gonzalez says the staff is taking good care of mom and says they are getting everything they need. They have pastoral support through [employer]. Sugey Gonzalez and her mother seem at ease with their current situation. No Spiritual Distress detected.  provided care this visit by  Allowing an opportunity for open dialogue, affirming both their suffering and their sense of assurance of God's renetta in the midst of suffering.  offered prayer. Recommendations:    to provide spiritual and emotional support with each visit through active listening, scripture and prayer.  to check with each visit about patient's desire for Spiritual Readings, Music and TV entertainment.

## 2017-06-27 NOTE — PROGRESS NOTES
Bedside report. Pt participates, denies pain at this time, denies nausea. 1900- sips of apple juice provided. NG placement checked per policy. 2030- She is alone, denies pain, nausea or respiratory issue. Restful. Face washed and mouth care provided for bedtime. She denies other needs. At this time LIWS is not resumed because of oral medication at this time. Pt denies abd pain or nausea and it is left clamped. 2300- denies abd pain, nausea. She is assisted to void per bedpan 300 ml clear, straw urine. 0230- Assisted to void 300 ml per bedpan. She denies pain, SOB, nausea. Family at the bedside. (6/27)      0505- FLACC 0. Eyes closed. Skin warm, dry. Family at the bedside and report her restful. 610- Eyes closed. FLACC 0. She does not respond to my voice. RR 16, easy. Family resting at the bedside. 2130- NG clamped.    330-  Open to suction  530-  NG clamped

## 2017-06-28 NOTE — PROGRESS NOTES
Progress Note    Patient: Nicol Fisher MRN: [id number]  SSN: [id number]    YOB: 1924  Age: 80 y.o. Sex: female      Admit Date: 6/17/2017    LOS: 11 days     Subjective:     Alert and oriented x 3. Taking in sips of liquids. Good results from enema yesterday with 3 large bowel movements. NG tube with no output in the past 24 hours. 50ml in the previous 24 hours. No vomiting or nausea. Has required Morphine x 2 SQ for pain. Family at bedside. Review of Systems:  Pertinent items are noted in the History of Present Illness. Objective:     Vitals:    06/26/17 1700 06/27/17 0616 06/27/17 1721 06/28/17 0603   BP: (!) 206/103 (!) 174/98 158/80 (!) 159/94   Pulse: 86 90 79 82   Resp: 17 16  16   Temp: 96.2 °F (35.7 °C) 97.1 °F (36.2 °C) 95.7 °F (35.4 °C) 96 °F (35.6 °C)        Intake and Output:  Current Shift: 06/28 0701 - 06/28 1900  In: -   Out: 120 [Urine:120]  Last three shifts: 06/26 1901 - 06/28 0700  In: 120 [P.O.:120]  Out: 100 [Urine:100]    Physical Exam:   GENERAL: alert, cooperative, no distress, appears stated age  LUNG: Clear, diminished breath sounds with unlabored respirations. HEART: regular rate and rhythm, S1, S2 normal, no murmur, click, rub or gallop  ABDOMEN: soft, non-tender and distended. Bowel sounds active. NG to right nare. EXTREMITIES:  extremities normal, atraumatic, no cyanosis or edema  SKIN: Normal except Stage 2 wound on sacrum. NEUROLOGIC: Alert and oriented x 3. Able to follow commands. Bedbound. PSYCHIATRIC: non focal    Lab/Data Review:  No new labs resulted in the last 24 hours.     Assessment:     Principal Problem:    Small bowel obstruction due to adhesions ([facility] (6/17/2017)    Active Problems:    Acute on chronic renal failure ([facility] (8/7/2015)        Plan:     Current Facility-Administered Medications   Medication Dose Route Frequency    polyethylene glycol (MIRALAX) packet 17 g  17 g Per NG tube DAILY PRN    LORazepam (ATIVAN) injection 0.26 mg 0.26 mg IntraMUSCular QHS    Or    LORazepam (ATIVAN) tablet 0.25 mg  0.25 mg Oral QHS    haloperidol lactate (HALDOL) injection 2 mg  2 mg SubCUTAneous Q1H PRN    glycopyrrolate (ROBINUL) injection 0.2 mg  0.2 mg SubCUTAneous Q4H PRN    LORazepam (ATIVAN) injection 0.5 mg  0.5 mg IntraMUSCular Q4H PRN    polyvinyl alcohol (LIQUIFILM TEARS) 1.4 % ophthalmic solution 1 Drop  1 Drop Both Eyes PRN    latanoprost (XALATAN) 0.005 % ophthalmic solution 1 Drop  1 Drop Both Eyes QHS    morphine injection 2 mg  2 mg SubCUTAneous Q20MIN PRN    acetaminophen (TYLENOL) suppository 650 mg  650 mg Rectal Q3H PRN    bisacodyl (DULCOLAX) suppository 10 mg  10 mg Rectal DAILY PRN     Admitted GIP with Small bowel obstruction for management of pain and nausea/vomiting and anxiety. 1. Pain: Morphine as ordered. 2. Nausea/vomiting: Haloperidol and Lorazepam as ordered. Diet as tolerated. NG as ordered. 3. Anxiety: Lorazepam as ordered. 4. Family/Pt Support: Family at bedside during exam. Medications and plan of care discussed with nursing staff and family. Will continue to monitor for symptoms and adjust medications as needed to maintain patient comfort. PPS 30%. Case discussed with Dr. Marlen Gonzalez. Will clamp NG for 10 hours and alternate with 2 hours to low intermittent wall suction.      Signed By: Shaheen Scherer NP     June 28, 2017

## 2017-06-28 NOTE — PROGRESS NOTES
End of shift summary-Patient used bed pan during this shift. Patient did not require any PRN medications. NGT was clamped/suctioned per order. Patient tolerated well.

## 2017-06-29 NOTE — PROGRESS NOTES
Progress Note    Patient: Cecy Gallagher MRN: [id number]  SSN: [id number]    YOB: 1924  Age: 80 y.o. Sex: female      Admit Date: 6/17/2017    LOS: 12 days     Subjective:     Alert and oriented x 3. Eating most of her tray at supper. NG tube with output of 80ml in the previous 24 hours. No vomiting or nausea. Has tolerated NG clamping for 10 hours and alternating with 2 hours to low intermittent wall suction without N/V. Review of Systems:  Pertinent items are noted in the History of Present Illness. Objective:     Vitals:    06/28/17 0603 06/28/17 1800 06/29/17 0315 06/29/17 1834   BP: (!) 159/94 180/82 (!) 168/93    Pulse: 82 75 84    Resp: 16 17 16    Temp: 96 °F (35.6 °C) 97.1 °F (36.2 °C) 96.9 °F (36.1 °C) 98 °F (36.7 °C)        Intake and Output:  Current Shift:    Last three shifts: 06/28 0701 - 06/29 1900  In: -   Out: 695 [Urine:695]    Physical Exam:   GENERAL: alert, cooperative, no distress, appears stated age  LUNG: Clear, diminished breath sounds with unlabored respirations. HEART: regular rate and rhythm, S1, S2 normal, no murmur, click, rub or gallop  ABDOMEN: soft, non-tender and distended. Bowel sounds active. NG to right nare. EXTREMITIES:  extremities normal, atraumatic, no cyanosis or edema  SKIN: Normal except Stage 2 wound on sacrum. NEUROLOGIC: Alert and oriented x 3. Able to follow commands. Bedbound. PSYCHIATRIC: non focal    Lab/Data Review:  No new labs resulted in the last 24 hours.     Assessment:     Principal Problem:    Small bowel obstruction due to adhesions ([address] (6/17/2017)    Active Problems:    Acute on chronic renal failure (HCC) (8/7/2015)        Plan:     Current Facility-Administered Medications   Medication Dose Route Frequency    polyethylene glycol (MIRALAX) packet 17 g  17 g Per NG tube DAILY PRN    LORazepam (ATIVAN) injection 0.26 mg  0.26 mg IntraMUSCular QHS    Or    LORazepam (ATIVAN) tablet 0.25 mg  0.25 mg Oral QHS    haloperidol lactate (HALDOL) injection 2 mg  2 mg SubCUTAneous Q1H PRN    glycopyrrolate (ROBINUL) injection 0.2 mg  0.2 mg SubCUTAneous Q4H PRN    LORazepam (ATIVAN) injection 0.5 mg  0.5 mg IntraMUSCular Q4H PRN    polyvinyl alcohol (LIQUIFILM TEARS) 1.4 % ophthalmic solution 1 Drop  1 Drop Both Eyes PRN    latanoprost (XALATAN) 0.005 % ophthalmic solution 1 Drop  1 Drop Both Eyes QHS    morphine injection 2 mg  2 mg SubCUTAneous Q20MIN PRN    acetaminophen (TYLENOL) suppository 650 mg  650 mg Rectal Q3H PRN    bisacodyl (DULCOLAX) suppository 10 mg  10 mg Rectal DAILY PRN     Admitted GIP with Small bowel obstruction for management of pain and nausea/vomiting and anxiety. 1. Pain: Morphine as ordered. 2. Nausea/vomiting: Haloperidol and Lorazepam as ordered. Diet as tolerated. NG as ordered. 3. Anxiety: Lorazepam as ordered. 4. Family/Pt Support: No family at bedside during exam. Medications and plan of care discussed with nursing staff and family. Will continue to monitor for symptoms and adjust medications as needed to maintain patient comfort. PPS 30%. Case discussed with Dr. Marshall Sanchez. No changes in plan of care.      Signed By: Lilia Pearce NP     June 29, 2017

## 2017-06-29 NOTE — PROGRESS NOTES
The shift change rounds completed with the off going RN and report was taken. The patient was identified by name and date of birth. The patient is resting quietly in the bed with no signs of distress observed. Pain =0, no signs of anxiety, SOB or other distress observed at this time. The bed is low and locked and the side rails are up times 2 for safety. Will continue to monitor. 0800- Patient is being fed her breakfast by her daughter. The NG is in place and suction is off at this time. The patient continues to deny any discomfort. 12- gtts to bilateral eyes. Repositioned. Refuses to move to either side. Heels floated. Educated the family on the chance of skin breakdown if the patient is not repositioned during the time in bed. The patient continues to refuse to be turned. 1015- Encouraged the patient to reposition but she continues to state no.  1200- Patient states no distress. Refused to turn. 1445- Patient  continues to state no distress. Continues to refuse to be repositioned. 1715- Patient continues to rest and state no distress. Continues to refuse to be turned. 8232- Report given to the on coming RN.

## 2017-06-30 NOTE — HSPC IDG NURSE NOTES
Patient: Carlos Arambula    Date: 06/30/17  Time: 12:52 PM    [facility] Nurse Notes    UPDATE: Patient is a 80year old Female patient, with diagnosis of Small Bowel Obstruction. NG tube intact per report and clamped for 10 hrs at a time and to suction for 2 hours, and order received to Discontinue NG tube, per Dr Sylvester Degree. Possible discharge to home on Monday, pending patient improvement. GIP for Pain, Nausea and Vomiting. Hypotension reported. Positive bowel sounds per report. Bed morales reported. Tolerating full liquids per report. GOALS OF CARE:   Continue to monitor for optimal patient comfort and symptom management. Ongoing family support.             Signed by: Orly Juares RN MSN

## 2017-06-30 NOTE — HSPC IDG SOCIAL WORKER NOTES
Patient: Gamaliel Stevens    Date: 06/30/17  Time: 12:48 PM    [facility]  Notes    LMSW will continue to provide emotional support for the pt and family. Pt has bounced back and Dr is going to DC the NG tube today. Discharge planning:  Possible DC on Monday to home if she does well over the weekend.         Signed by: Faviola Choudhary

## 2017-06-30 NOTE — PROGRESS NOTES
Progress Note    Patient: Justin Lakhani MRN: [id number]  SSN: [id number]    YOB: 1924  Age: 80 y.o. Sex: female      Admit Date: 6/17/2017    LOS: 13 days     Subjective:     Alert and oriented x 3. Eating all of her full liquid tray. No vomiting or nausea. Has tolerated NG clamping for 10 hours and alternating with 2 hours to low intermittent wall suction without N/V. Family at bedside. Review of Systems:  Pertinent items are noted in the History of Present Illness. Objective:     Vitals:    06/29/17 1834 06/29/17 1919 06/30/17 0715 06/30/17 0716   BP:  166/75 189/88 116/58   Pulse:  80 86 100   Resp:  14  16   Temp: 98 °F (36.7 °C) 97.6 °F (36.4 °C)          Intake and Output:  Current Shift: 06/30 0701 - 06/30 1900  In: 120 [P.O.:120]  Out: -   Last three shifts: 06/28 1901 - 06/30 0700  In: -   Out: 575 [Urine:575]    Physical Exam:   GENERAL: alert, cooperative, no distress, appears stated age  LUNG: Clear, diminished breath sounds with unlabored respirations. HEART: regular rate and rhythm, S1, S2 normal, no murmur, click, rub or gallop  ABDOMEN: soft, non-tender and slightly distended. Bowel sounds active. NG removed. EXTREMITIES:  extremities normal, atraumatic, no cyanosis or edema  SKIN: Normal except Stage 1 on sacrum. NEUROLOGIC: Alert and oriented x 3. Able to follow commands. Bedbound. PSYCHIATRIC: non focal    Lab/Data Review:  No new labs resulted in the last 24 hours.     Assessment:     Principal Problem:    Small bowel obstruction due to adhesions ([facility] (6/17/2017)    Active Problems:    Acute on chronic renal failure (HCC) (8/7/2015)        Plan:     Current Facility-Administered Medications   Medication Dose Route Frequency    polyethylene glycol (MIRALAX) packet 17 g  17 g Per NG tube DAILY PRN    LORazepam (ATIVAN) injection 0.26 mg  0.26 mg IntraMUSCular QHS    Or    LORazepam (ATIVAN) tablet 0.25 mg  0.25 mg Oral QHS    haloperidol lactate (HALDOL) injection 2 mg  2 mg SubCUTAneous Q1H PRN    glycopyrrolate (ROBINUL) injection 0.2 mg  0.2 mg SubCUTAneous Q4H PRN    LORazepam (ATIVAN) injection 0.5 mg  0.5 mg IntraMUSCular Q4H PRN    polyvinyl alcohol (LIQUIFILM TEARS) 1.4 % ophthalmic solution 1 Drop  1 Drop Both Eyes PRN    latanoprost (XALATAN) 0.005 % ophthalmic solution 1 Drop  1 Drop Both Eyes QHS    morphine injection 2 mg  2 mg SubCUTAneous Q20MIN PRN    acetaminophen (TYLENOL) suppository 650 mg  650 mg Rectal Q3H PRN    bisacodyl (DULCOLAX) suppository 10 mg  10 mg Rectal DAILY PRN     Admitted GIP with Small bowel obstruction for management of pain and nausea/vomiting and anxiety. 1. Pain: Morphine as ordered. 2. Nausea/vomiting: Haloperidol and Lorazepam as ordered. Diet as tolerated. 3. Anxiety: Lorazepam as ordered. 4. Family/Pt Support: Family at bedside during exam. Medications and plan of care discussed with nursing staff and family. Will continue to monitor for symptoms and adjust medications as needed to maintain patient comfort. PPS 30%. Case discussed with Dr. Valarie Markham and in [address] [place] [address]. DC NG tube today.     Signed By: Maribel Baumann NP     June 30, 2017

## 2017-06-30 NOTE — PROGRESS NOTES
Summary Note- Patient rested quietly and did not require any PRN medications this shift. Denies pain and nausea. NGT removed this afternoon. Voiding (bedpan). Tolerating ordered diet without any difficulty swallowing. Wound care provided as ordered. Patient safety maintained through hourly rounding: bed low and locked, side rails x2, tab alerts on, call light within reach, and door open for continuous monitoring.

## 2017-06-30 NOTE — HSPC IDG VOLUNTEER NOTES
[address] Review     Status Codes I = Initiated C=Continued R=Revised RS = Resolved     C. Volunteer     Goal: Hospice house volunteer (s) enhances the quality of remaining life while patient is at the hospice house. Interventions: Mary Beth Volunteer (s) will provide companionship to the patient and/or family by visiting at the hospice house       . Mary Beth Volunteer (s) will provide respite as needed when requested by patient and/or family. [employer]  Volunteer will provide activities such as music, reading, pet therapy, etc. as requested. [employer]  Comfort bag delivered. Any other special requests or information regarding volunteer services:    Five visits for companionship, prayer, and volunteer nursing assistance are recorded. No further needs identified at this time. These notes have been discussed in [facility] meeting.           Signed by: Jasmine Benjamin

## 2017-06-30 NOTE — PROGRESS NOTES
CHANDLER discussed with the daughter this afternoon discharge plans. Dr will be removing NG tube and seeing how she does over the weekend. Pt will be monitored over the weekend and on Monday arrange dc home if she does well. We discussed equipment and daughter said her mother has a bed in her room. She made it sound like mom was going to get up and start walking around at home. We discussed bringing in [facility] for additional support as well. .    We discussed frequency of staff and their roles.

## 2017-07-01 NOTE — PROGRESS NOTES
Received report from off going RN and assumed care of patient. Patient identified by name and . Patient resting quietly in bed. Patient has no complaints at this time. Bed low and locked. Call light within reach. Door open for monitoring.

## 2017-07-01 NOTE — PROGRESS NOTES
End of shift summary- Patient did not require any PRN medications. Patient rested well. Family present. Used the bedpain. No BM this shift.

## 2017-07-01 NOTE — PROGRESS NOTES
Received report from night shift RN. Patient sleeping. Identified by name and date of birth on armband. No s/sx pain, agitation, dyspnea, or N/V. Bed low and locked, side rails x2, tab alerts on, call light within reach, and door closed with family at bedside.

## 2017-07-01 NOTE — PROGRESS NOTES
Received report from off going RN and assumed care of patient. Patient identified by name and . Patient requested pain medication for back pain and to use the bed pan. Medicated per MAR. Patient also given Bisacodyl laxative as ordered due to no BM in 2 days. Family at bed side. Patient repositioned. Bed low and locked. Call light within reach. Door open for monitoring when family not present.

## 2017-07-01 NOTE — PROGRESS NOTES
Summary Note- Patient rested quietly and did not require any PRN medications this shift. Denies pain and nausea. Voiding (bedpan). Tolerating ordered diet without any difficulty swallowing. Patient safety maintained through hourly rounding: bed low and locked, side rails x2, tab alerts on, call light within reach, and door open for continuous monitoring.

## 2017-07-02 NOTE — PROGRESS NOTES
Received report from off going RN and assumed care of patient. Patient identified by name and . Patient resting  in bed with family at bed side. No s/sx of pain or n/v at this time. Patient pulled up in bed upon request. Bed low and locked. Call light within reach. Tab alarm on. Door open for monitoring when family is not present.

## 2017-07-02 NOTE — HSPC IDG CHAPLAIN NOTES
Patient: Reuben Hernandez    Date: 07/02/17  Time: 4:46 PM    [facility]  Notes  Spiritual care assessment completed on June 27.  offered support with the ministry of presence, compassion and prayer.         Signed by: Katie Verde

## 2017-07-02 NOTE — PROGRESS NOTES
End of shift summary- Patient has required Morphine and Bisacodyl suppository x 1 for pain and 0 BM in 2 days. Patient had BM and used bedpan frequently throughout this shift.

## 2017-07-03 NOTE — PROGRESS NOTES
Pt did well over the weekend. Daughter is agreeable to discharge today at 3pm.    LMSW discussed getting medical equipment in the home. Daughter did not want to have a bed because she did not have room. LMSW offered to have the in home program assess what she needs when she gets there. .. LMSW called and arranged transport at 3pm.  LMSW sent message to the home program for a tuck in visit.

## 2017-07-03 NOTE — PROGRESS NOTES
End of shift summary- Patient has not required any PRN medications this shift. Patient rested well and equested bed pan when needed. 0 BM this shift.

## 2017-07-03 NOTE — DISCHARGE SUMMARY
Discharge Summary     Patient: Shannan Plunkett MRN: [id number]  SSN: [id number]    YOB: 1924  Age: 80 y.o.   Sex: female       Admit Date: 6/17/2017    Discharge Date: 7/3/2017      Admission Diagnoses: Small bowel obstruction  Small bowel obstruction due to adhesions (HCC)  Abdominal adhesions  CKD (chronic kidney disease)  Nausea & vomiting  Dehydration    Discharge Diagnoses:   Problem List as of 7/3/2017  Date Reviewed: 7/3/2017          Codes Class Noted - Resolved    * (Principal)Small bowel obstruction due to adhesions [facility]) ICD-10-CM: K56.5  ICD-9-CM: 560.81  6/17/2017 - Present        SBO (small bowel obstruction) ([facility] ICD-10-CM: K56.69  ICD-9-CM: 560.9  6/14/2017 - Present        Underweight ICD-10-CM: R63.6  ICD-9-CM: 783.22  6/14/2017 - Present        GLADYS (acute kidney injury) ([facility] ICD-10-CM: N17.9  ICD-9-CM: 584.9  1/25/2017 - Present        Diarrhea ICD-10-CM: R19.7  ICD-9-CM: 787.91  1/25/2017 - Present        Nausea ICD-10-CM: R11.0  ICD-9-CM: 787.02  1/25/2017 - Present        Malignant HTN with heart disease, 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, with chronic kidney disease ICD-10-CM: I13.10  ICD-9-CM: 404.00, 585.9  8/8/2015 - Present        Hyperkalemia ICD-10-CM: E87.5  ICD-9-CM: 276.7  8/8/2015 - Present        Generalized weakness ICD-10-CM: R53.1  ICD-9-CM: 780.79  8/8/2015 - Present        Dehydration ICD-10-CM: E86.0  ICD-9-CM: 276.51  8/8/2015 - Present        Acute on chronic renal failure ([facility] ICD-10-CM: N17.9, N18.9  ICD-9-CM: 584.9, 585.9  8/7/2015 - Present        Anemia ICD-10-CM: D64.9  ICD-9-CM: 285.9  5/22/2013 - Present        Sepsis ([facility] [address] ICD-10-CM: A41.9  ICD-9-CM: 995.91  5/22/2013 - Present        Paralytic ileus ([facility] ICD-10-CM: K56.0  ICD-9-CM: 560.1  5/22/2013 - Present        Anorexia ICD-10-CM: R63.0  ICD-9-CM: 783.0  5/18/2013 - Present        Post herpetic neuralgia ICD-10-CM: B02.29  ICD-9-CM: 053.19  5/16/2013 - Present        DNR (do not resuscitate) ICD-10-CM: Z66  ICD-9-CM: V49.86  5/16/2013 - Present        UTI (lower urinary tract infection) ICD-10-CM: N39.0  ICD-9-CM: 599.0  5/16/2013 - Present        Anemia, unspecified ICD-10-CM: D64.9  ICD-9-CM: 285.9  5/3/2013 - Present        Chronic kidney disease, stage III (moderate) (Chronic) ICD-10-CM: N18.3  ICD-9-CM: 585.3  5/3/2013 - Present        Shingles outbreak ICD-10-CM: B02.9  ICD-9-CM: 053.9  5/2/2013 - Present        Metabolic encephalopathy PAT-76-EW: G93.41  ICD-9-CM: 348.31  5/2/2013 - Present    Overview Signed 5/2/2013 10:26 AM by Kassidy Balbuena     Due to medications, shingles, hyponatremia,etc             Hyponatremia ICD-10-CM: E87.1  ICD-9-CM: 276.1  5/2/2013 - Present        Fever, unspecified ICD-10-CM: R50.9  ICD-9-CM: 780.60  5/2/2013 - Present        HTN (hypertension) (Chronic) ICD-10-CM: I10  ICD-9-CM: 401.9  5/2/2013 - Present        Blindness - both eyes (Chronic) ICD-9-CM: 369.00  5/2/2013 - Present               Discharge Condition: Stable    Code Status:DNR    Hospital Course: Discharge from [facility] to home with [facility]. DC  7/3/ 17 at 3 PM via ambulance. Status at time of discharge is routine. Consults: None    Significant Diagnostic Studies: None    Disposition: home    Discharge Medications:   Current Discharge Medication List      START taking these medications    Details   bisacodyl (DULCOLAX) 10 mg suppository Insert 10 mg into rectum daily as needed. Qty: 3 Suppository, Refills: 0      !! LORazepam (ATIVAN) 0.5 mg tablet Take 0.5 Tabs by mouth nightly. Max Daily Amount: 0.25 mg.  Qty: 5 Tab, Refills: 0      !! LORazepam (ATIVAN) 0.5 mg tablet Take 0.5 Tabs by mouth every four (4) hours as needed. Max Daily Amount: 1.5 mg.  Qty: 5 Tab, Refills: 0      oxyCODONE IR (ROXICODONE) 5 mg immediate release tablet Take 0.5 Tabs by mouth every four (4) hours as needed (pain, dyspnea, or agonal distress). Max Daily Amount: 15 mg.  Indications: HAND WRITTEN SCRIPT GIVEN FOR 10 TABLETS  Qty: 10 Tab, Refills: 0      polyethylene glycol (MIRALAX) 17 gram packet 1 Packet by Per NG tube route daily. Indications: HOME SUPPLY RETURNED. Qty: 3 Packet, Refills: 0      polyvinyl alcohol (LIQUIFILM TEARS) 1.4 % ophthalmic solution Administer 1 Drop to both eyes as needed for up to 30 days. Indications: DRY EYE, HOME SUPPLY RETURNED. Qty: 10 mL, Refills: 0       !! - Potential duplicate medications found. Please discuss with provider. CONTINUE these medications which have CHANGED    Details   latanoprost (XALATAN) 0.005 % ophthalmic solution Administer 1 Drop to both eyes nightly. Indications: RETURN HOME SUPPLY. Qty: 1 mL, Refills: 0         STOP taking these medications       traMADol (ULTRAM) 50 mg tablet Comments:   Reason for Stopping:         amLODIPine (NORVASC) 10 mg tablet Comments:   Reason for Stopping:         promethazine (PHENERGAN) 25 mg suppository Comments:   Reason for Stopping:         nepafenac (NEVANAC) 0.1 % ophthalmic suspension Comments:   Reason for Stopping:         metoprolol (LOPRESSOR) 50 mg tablet Comments:   Reason for Stopping:         iron, carbonyl (FEOSOL) 45 mg tab Comments:   Reason for Stopping:         erythromycin (ILOTYCIN) ophthalmic ointment Comments:   Reason for Stopping:         aspirin 81 mg tablet Comments:   Reason for Stopping:         timolol (BETIMOL) 0.5 % ophthalmic solution Comments:   Reason for Stopping:               Activity: Bedrest  Diet: Comfort feeding  Wound Care: As directed  Oxygen: None  Equipment: [facility] home team to reassess as pt dtr declines DME at this time. Meds: Home supply of xalatan, miralax, and liquifilm eye drops. Prescriptions provided for other meds including ativan, oxycodone, and bisacodyl. MIRALAX TO BE GIVEN PO AS NGT DC'D. Follow-up Appointments   Procedures    FOLLOW UP VISIT Appointment in: Other (Specify) DC home with family via ambulance. DC home with family via ambulance.      Standing Status: Standing     Number of Occurrences:   1     Order Specific Question:   Appointment in     Answer:    Other (Specify)       Signed By: Isaak Moseley NP     July 3, 2017

## 2017-07-03 NOTE — PROGRESS NOTES
DC instructions reviewed with her daughter Demetris Ruelas. Specifically medications, activity and diet. She denies questions and medications are given to her. She is able to reiterate information on command. Belongings are packaged by her and removed from the room at this time. She is planning on DC around 3pm.  She states she will take the scripts to [facility] and fill them, and that her son will be at home waiting for the patient's arrival.  She denies questions, concerns at this time. Pt is awake, interactive. She answers questions when prompted, denies pain, nausea, SOB. She is also aware DC is impending. Pt and daughter report they have lived together, and her daughter has been the primary caregiver for 17 years.

## 2017-07-03 NOTE — [facility] IDG MASTER NOTE
Hospice Interdisciplinary Group Collaborative  Date: 06/30/2017  Time: 12:30 PM    ___________________    Patient: Yeison Govea  Insurance ID: [unfilled]  MRN: [id number]          ___________________    Diagnoses:  Diagnoses of Chronic kidney disease, stage III (moderate), Dehydration, Small bowel obstruction due to adhesions [facility]), and Lower urinary tract infectious disease were pertinent to this visit.     Current Medications:    Current Facility-Administered Medications:     polyethylene glycol (MIRALAX) packet 17 g, 17 g, Per NG tube, DAILY PRN, Angelina Bazzi NP  [facility]  [DISCONTINUED] LORazepam (ATIVAN) injection 0.26 mg, 0.26 mg, IntraMUSCular, QHS **OR** LORazepam (ATIVAN) tablet 0.25 mg, 0.25 mg, Oral, QHS, Elian Rogers, NP, 0.25 mg at 07/02/17 2034    haloperidol lactate (HALDOL) injection 2 mg, 2 mg, SubCUTAneous, Q1H PRN, Elian Rogers NP    glycopyrrolate (ROBINUL) injection 0.2 mg, 0.2 mg, SubCUTAneous, Q4H PRN, Elian Rogers NP    LORazepam (ATIVAN) injection 0.5 mg, 0.5 mg, IntraMUSCular, Q4H PRN, Elian Rogers NP    polyvinyl alcohol (LIQUIFILM TEARS) 1.4 % ophthalmic solution 1 Drop, 1 Drop, Both Eyes, PRN, Angelina Bazzi NP, 1 Drop at 06/29/17 0843    latanoprost (XALATAN) 0.005 % ophthalmic solution 1 Drop, 1 Drop, Both Eyes, QHS, Angelina Bazzi NP, 1 Drop at 07/02/17 2243    morphine injection 2 mg, 2 mg, SubCUTAneous, Q20MIN PRN, 2 mg at 07/01/17 1915 **OR** [DISCONTINUED] morphine injection 2 mg, 2 mg, IntraVENous, Q20MIN PRN, Angelina Bazzi, NP, 2 mg at 06/22/17 0501    acetaminophen (TYLENOL) suppository 650 mg, 650 mg, Rectal, Q3H PRN, Angelina Bazzi NP    bisacodyl (DULCOLAX) suppository 10 mg, 10 mg, Rectal, DAILY PRN, Angelina Bazzi NP, 10 mg at 07/01/17 1931    Orders:  Orders Placed This Encounter    DIET CLEAR LIQUID     Standing Status:   Standing     Number of Occurrences:   1     Order Specific Question:   Likes/Dislikes/Preferences     Answer: PLEASE ADVANCE DIET TO FULL LIQUIDS.  VITAL SIGNS     Standing Status:   Standing     Number of Occurrences:   1    VITAL SIGNS     Standing Status:   Standing     Number of Occurrences:   1    NURSING-MISCELLANEOUS: Comfort Care Measures CONTINUOUS     Standing Status:   Standing     Number of Occurrences:   1     Order Specific Question:   Description of Order:     Answer:   Comfort Care Measures    SONI CATHETER, CARE     1. Soni Catheter care every shift and PRN  2. Notify Physician of Soni Catheter leakage, occlusion, gross adherent sediment or accidental removal  3. Change Soni 30 days after insertion. Standing Status:   Standing     Number of Occurrences:   1    BLADDER CHECKS     May scan bladder PRN for urinary retention and or patient discomfort     Standing Status:   Standing     Number of Occurrences:   1    NURSING ASSESSMENT:  SPECIFY Assess for GIP, routine, or respite level of care. Q SHIFT Routine     Standing Status:   Standing     Number of Occurrences:   1     Order Specific Question:   Please describe the test or procedure you would like to order. Answer:   Assess for GIP, routine, or respite level of care.  PAIN ASSESSMENT Pain and Symptoms: Assess ever 4 hours and PRN, for GIP level of care. PRN Routine     Standing Status:   Standing     Number of Occurrences:   1     Order Specific Question:   Please describe the test or procedure you would like to order. Answer:   Pain and Symptoms: Assess ever 4 hours and PRN, for GIP level of care.  BEDREST, COMPLETE     Standing Status:   Standing     Number of Occurrences:   1    NURSING-MISCELLANEOUS: Will initiate bowel meds as pt with active bowel sounds x all quadrants. CONTINUOUS     Standing Status:   Standing     Number of Occurrences:   1     Order Specific Question:   Description of Order:     Answer: Will initiate bowel meds as pt with active bowel sounds x all quadrants.     NURSING-MISCELLANEOUS: Verbal CTI/Narrative Hospice diagnosis; small bowel obstruction, intra peritoneal adhesions This is an order to admit to inpatient hospice care for a third hospice benefit interval, a 80year-old woman with a history of expe. .. Hospice diagnosis; small bowel obstruction, intra peritoneal adhesions     This is an order to admit to inpatient hospice care for a third hospice benefit interval, a 80year-old woman with a history of experiencing bowel obstructions on numerous occasions  in recent past. Has had laparotomy for lysis of adhesions prior to 2015. The present occasion  6/14/ 2017 patient presented with abdominal pain, nausea and  vomiting. CT scan demonstrated  small bowel obstruction with  fluid filled loops of small bowel. Patient has permitted placement of nasogastric tube to control vomiting and continues to drain copious amounts of gastric aspirate. The patient who is blind and somewhat infirmed has  plainly stated she does not wish for surgical intervention. She excepts the probable lethal event of that decision and she states that she wishes to limit further  care to comfort measures only. Under these circumstances her life expectancy is less than 10 days. She will continue to require anti-emetic, analgesic and anxiolytic medications during her terminal decline. Her detention daughter supports her decision. Glaucoma, blindness, hypertension, chronic renal failure are diagnoses apart from intestinal obstruction that contribute modestly  to her  terminal progress. Standing Status:   Standing     Number of Occurrences:   1     Order Specific Question:   Description of Order:     Answer:   Verbal CTI/Narrative    NURSING-MISCELLANEOUS: Please remove NG tube 6/30/17. CONTINUOUS     Standing Status:   Standing     Number of Occurrences:   1     Order Specific Question:   Description of Order:     Answer:   Please remove NG tube 6/30/17.     WOUND CARE, DRESSING CHANGE     Wound Care:  Location: Sacrum  Stage I - Cleanse wound location with wound cleanser, pat dry and apply Cavilon Durable Barrier Cream every 12 hours and PRN if soiled. Turn every 2 hours. Assess with each nursing assessment visit. Standing Status:   Standing     Number of Occurrences:   29    DO NOT RESUSCITATE     Standing Status:   Standing     Number of Occurrences:   1     Order Specific Question:   Comfort Measures Only? Answer: Yes    DISCONTD: sodium chloride (NS) flush 3 mL    DISCONTD: sodium chloride (NS) flush 3 mL    morphine injection 2 mg    DISCONTD: morphine injection 2 mg    DISCONTD: haloperidol (HALDOL) 2 mg/mL oral solution 2 mg    acetaminophen (TYLENOL) suppository 650 mg    DISCONTD: LORazepam (ATIVAN) injection 1 mg    DISCONTD: glycopyrrolate (ROBINUL) injection 0.2 mg    bisacodyl (DULCOLAX) suppository 10 mg    DISCONTD: haloperidol lactate (HALDOL) injection 2 mg    DISCONTD: LORazepam (ATIVAN) injection 0.5 mg    polyvinyl alcohol (LIQUIFILM TEARS) 1.4 % ophthalmic solution 1 Drop    latanoprost (XALATAN) 0.005 % ophthalmic solution 1 Drop     Order Specific Question:   Specific disease being treated with this drug. Answer:   glaucoma     Order Specific Question:   Is this requested medication unique in class, structure or indication     Answer:   No     Order Specific Question:   Reasons for patient to receive nonformulary medication     Answer:   patient home supply.     DISCONTD: LORazepam (ATIVAN) injection 0.5 mg    DISCONTD: LORazepam (ATIVAN) tablet 0.5 mg    sodium phosphate (FLEET'S) enema 118 mL    haloperidol lactate (HALDOL) injection 2 mg    DISCONTD: LORazepam (ATIVAN) injection 0.5 mg    DISCONTD: LORazepam (ATIVAN) tablet 0.5 mg    glycopyrrolate (ROBINUL) injection 0.2 mg    LORazepam (ATIVAN) injection 0.5 mg    DISCONTD: sodium phosphate (FLEET'S) enema 118 mL    DISCONTD: bisacodyl (DULCOLAX) suppository 10 mg    bisacodyl (DULCOLAX) suppository 10 mg  polyethylene glycol (MIRALAX) packet 17 g     Order Specific Question:   Specific disease being treated with this drug. Answer:   constipation     Order Specific Question:   Is this requested medication unique in class, structure or indication     Answer:   No     Order Specific Question:   Reasons for patient to receive nonformulary medication     Answer:   Pt preferred.  DISCONTD: LORazepam (ATIVAN) injection 0.26 mg    LORazepam (ATIVAN) tablet 0.25 mg    polyethylene glycol (MIRALAX) packet 17 g     Order Specific Question:   Specific disease being treated with this drug. Answer:   severe constipation vs obstipation     Order Specific Question:   Is this requested medication unique in class, structure or indication     Answer:   No     Order Specific Question:   Reasons for patient to receive nonformulary medication     Answer:   Patient preferred.  INITIAL PHYSICIAN ORDER: HOSPICE Level Of Care: General; Reason for Admission: Admit GIP for dx of small bowel obstruction for management of nausea/vomiting, pain, and anxiety     Standing Status:   Standing     Number of Occurrences:   1     Order Specific Question:   Status     Answer:   Hospice     Order Specific Question:   Level Of Care     Answer:   General     Order Specific Question:   Reason for Admission     Answer:   Admit GIP for dx of small bowel obstruction for management of nausea/vomiting, pain, and anxiety     Order Specific Question:   Inpatient Hospitalization Certified Necessary for the Following Reasons     Answer:   3.  Patient receiving treatment that can only be provided in an inpatient setting (further clarification in H&P documentation)     Order Specific Question:   Admitting Diagnosis     Answer:   Small bowel obstruction due to adhesions [facility]) [5123474]     Order Specific Question:   Terminal Prognosis Diagnosis(es)     Answer:   Abdominal adhesions [8164676]     Order Specific Question:   Terminal Prognosis Diagnosis(es)     Answer:   CKD (chronic kidney disease) [4872135]     Order Specific Question:   Terminal Prognosis Diagnosis(es)     Answer:   Nausea & vomiting [281429]     Order Specific Question:   Terminal Prognosis Diagnosis(es)     Answer:   Dehydration [276.51. ICD-9-CM]     Order Specific Question:   Admitting Physician     Answer:   Lizbeth Cherry [[id number]]     Order Specific Question:   Attending Physician     Answer:   Gely York     Order Specific Question:   Discharge Plan:     Answer: Other (Specify)     Comments:   Anticipate demise at [facility]. Allergies:  No Known Allergies    ___________________    Care Team Notes          POC/IDG Notes      [facility] IDG  Notes by Adelita Crawford at 06/30/17 1646  Version 1 of 1    Author:  Adelita Crawford Service:  Spiritual Care Author Type:  Pastoral Care    Filed:  07/02/17 1648 Date of Service:  06/30/17 1646 Status:  Signed    :  Adelita Crawford (Pastoral Care)           Patient: Amilcar Soriano    Date: 07/02/17  Time: 4:46 PM    [facility]  Notes  Spiritual care assessment completed on June 27.  offered support with the ministry of presence, compassion and prayer. Signed by: Adelita Crawford       [facility] IDG Nurse Notes by Andreea Spencer at 06/30/17 1252  Version 1 of 1    Author:  Andreea Spencer Service:  [facility] Author Type:  Registered Nurse    Filed:  06/30/17 1257 Date of Service:  06/30/17 1252 Status:  Signed    :  Andreea Spencer (Registered Nurse)           Patient: Amilcar Soriano    Date: 06/30/17  Time: 12:52 PM    [facility] Nurse Notes    UPDATE: Patient is a 80year old Female patient, with diagnosis of Small Bowel Obstruction. NG tube intact per report and clamped for 10 hrs at a time and to suction for 2 hours, and order received to Discontinue NG tube, per Dr Jose Guadalupe Johnson. Possible discharge to home on Monday, pending patient improvement. GIP for Pain, Nausea and Vomiting. Hypotension reported.  Positive bowel sounds per report. Bed morales reported. Tolerating full liquids per report. GOALS OF CARE:   Continue to monitor for optimal patient comfort and symptom management. Ongoing family support. Signed by: Robert Villalobos RN MSN       [facility] IDG  Notes by Josh Fraire at 06/30/17 1248  Version 1 of 1    Author:  Josh Fraire Service:  Ronal Lobato Author Type:      Filed:  06/30/17 1257 Date of Service:  06/30/17 1248 Status:  Signed    :  Josh Fraire ()           Patient: Staci Borges    Date: 06/30/17  Time: 12:48 PM    [facility]  Notes    LMSW will continue to provide emotional support for the pt and family. Pt has bounced back and Dr is going to DC the NG tube today. Discharge planning:  Possible DC on Monday to home if she does well over the weekend. Signed by: Josh Fraire       [facility] IDG Volunteer Notes by Deborah Terrazas at 06/30/17 1208  Version 1 of 1    Author:  Deborah Terrazas Service:  Ronal Lobato Author Type:  Hospice Volunteer/    Filed:  06/30/17 1209 Date of Service:  06/30/17 1208 Status:  Signed    :  Deborah Terrazas (Hospice Volunteer/)               [facility] Interdisciplinary Plan of Care Review     Status Codes I = Initiated C=Continued R=Revised RS = Resolved     C. Volunteer     Goal: Hospice house volunteer (s) enhances the quality of remaining life while patient is at the hospice house. Interventions: Saira Talavera Volunteer (s) will provide companionship to the patient and/or family by visiting at the hospice house       . Saira Talavera Volunteer (s) will provide respite as needed when requested by patient and/or family. Saira Paz  Volunteer will provide activities such as music, reading, pet therapy, etc. as requested. Saira Paz  Comfort bag delivered.         Any other special requests or information regarding volunteer services:    Five visits for companionship, prayer, and volunteer nursing assistance are recorded. No further needs identified at this time. These notes have been discussed in [facility] meeting. Signed by: Carrie Pizarro [facility]  Notes by Susan Adames at 06/26/17 1318  Version 1 of 1    Author:  Susan Adames Service:  Joanie Schaumann Author Type:      Filed:  06/26/17 1333 Date of Service:  06/26/17 1318 Status:  Signed    :  Susan Adames ()           Patient: Rosalinda Lamberts    Date: 06/26/17  Time: 1:18 PM    [address] [place] [address]  Notes  LMSW will continue to provide emotional support to pt and family. Signed by: Susan Adames       [facility]  Notes by Ilana Curtis at 06/26/17 1318  Version 1 of 1    Author:  Ilana Curtis Service:  Spiritual Care Author Type:  Pastoral Care    Filed:  06/26/17 1333 Date of Service:  06/26/17 1318 Status:  Signed    :  Ilana Curtis (Pastoral Care)           Patient: Rosalinda Lamberts    Date: 06/26/17  Time: 1:18 PM    [facility]  Notes    Initial Spiritual Assessment completed by Galileo Paulino, Courtney Travis. Patient values prayer and is open to continued support. Signed by: Ilana Curtis       [address] [place] [address] Nurse Notes by Bernetta Gosselin at 06/26/17 1318  Version 1 of 1    Author:  Bernetta Gosselin Service:  Joanie Schaumann Author Type:  Registered Nurse    Filed:  06/26/17 1331 Date of Service:  06/26/17 1318 Status:  Signed    :  Bernetta Gosselin (Registered Nurse)           Patient: Rosalinda Lamberts    Date: 06/26/17  Time: 1:18 PM    [address] [place] [address] Nurse Notes    UPDATE: Patient is a 80year old Female, with history of bowel obstructions. 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 Per Emy, patient isn't wanting surgery for bowel obstruction. NG tube intact per report. Enema administered yesterday per report, and patient had BM per report.  GIP for pain, nausea and vomiting management. Advance to Full liquid diet begun per today's IDG discussion. New order to clamp NG tube for 6 hours at a time, and them NG to suction every 2 hours, then alternate, to prevent abdominal distention. GOALS OF CARE:   Continue to monitor for optimal patient comfort and symptom management. Ongoing family support. Signed by: Heather Merino RN MSN       [facility] IDG  Notes by Aram De León at 06/23/17 1633  Version 1 of 1    Author:  Aram De León Service:  Kenneth Stoner Author Type:  Pastoral Care    Filed:  06/23/17 2070 Date of Service:  06/23/17 1633 Status:  Signed    :  Aram De León (Pastoral Care)           Patient: Victor Hugo Will    Date: 06/23/17  Time: 4:33 PM    [facility]  Notes  Patient admitted on 6/17 and initial spiritual assessment completed on 6/20/17. Plan of care is for spiritual/emotional support to continue to be available as needed, requested, or referred. Signed by: Aram De León       [facility] IDG Volunteer Notes by Sydni Del Valle at 06/23/17 1756  Version 1 of 1    Author:  Sydni Del Valle Service:  Kenneth Stoner Author Type:  Hospice Volunteer/    Filed:  06/23/17 1336 Date of Service:  06/23/17 1335 Status:  Signed    :  Sydni Del Valle (Hospice Volunteer/)               [facility] Interdisciplinary Plan of Care Review     Status Codes I = Initiated C=Continued R=Revised RS = Resolved     C. Volunteer     Goal: Hospice house volunteer (s) enhances the quality of remaining life while patient is at the hospice house. Interventions: Ant Gonzales Volunteer (s) will provide companionship to the patient and/or family by visiting at the hospice house       . Ant Gonzales Volunteer (s) will provide respite as needed when requested by patient and/or family. Ant Stafford  Volunteer will provide activities such as music, reading, pet therapy, etc. as requested. Ant Stafford  Comfort bag delivered. Any other special requests or information regarding volunteer services: Three visits for comfort bag delivery , prayer, and volunteer nursing assistance are recorded. No further needs identified at this time. These notes have been discussed in [facility] meeting. Signed by: Joan Muller       [address] [place] [address] Nurse Notes by Arnoldo Go RN at 06/23/17 1258  Version 1 of 1    Author:  Arnoldo Go RN Service:  [place] [facility] Author Type:  Registered Nurse    Filed:  06/23/17 1251 Date of Service:  06/23/17 1258 Status:  Signed    :  Arnoldo Go RN (Registered Nurse)           Patient: Carlos Arambula    Date: 06/23/17  Time: 12:58 PM    [facility] Nurse Notes  F/U: Pt is 81 y/o female with small bowel obstruction. Had to be disimpacted and had good results with enema. IV infiltrated. Meds given SQ. Morphine x 2 for pain. Taking sips only. Not eating. NG has had no output in past 24 hours. Plan: Leave NG in place as is. Monitor for symptom management and optimal comfort.          Signed by: Arnoldo Go RN       [facility] IDG  Notes by Soha Loaiza at 06/23/17 1212  Version 1 of 1    Author:  Soha Loaiza Service:  [facility] Author Type:      Filed:  06/23/17 1213 Date of Service:  06/23/17 1212 Status:  Signed    :  Soha Loaiza ()           Patient: Carlos Arambula    Date: 06/23/17  Time: 12:12 PM    [facility]  Notes  LMSW will continue to visit to provide emotional support, active listening and community resources as needed        Signed by: Soha Loaiza       [facility] IDG  Notes by Chi Lion at 06/19/17 1651  Version 1 of 1    Author:  Chi Lion Service:  [facility] Author Type:  Pastoral Care    Filed:  06/19/17 1652 Date of Service:  06/19/17 1651 Status:  Signed    :  Chi Lion (Pastoral Care)           Patient: Carlos Arambula    Date: 06/19/17  Time: 4:51 PM    [facility]  Notes  Patient admitted on 6/17/17. Initial Spiritual Care Assessment is pending. Signed by: Dioni BORJAS [facility] IDG Volunteer Notes by Nasima Lester at 06/19/17 1406  Version 1 of 1    Author:  Nasima Lester Service:  [facility] Author Type:  Hospice Volunteer/    Filed:  06/19/17 1406 Date of Service:  06/19/17 1406 Status:  Signed    :  Nasima Lester (Hospice Volunteer/)               [facility] Interdisciplinary Plan of Care Review     Status Codes I = Initiated C=Continued R=Revised RS = Resolved     I.  Volunteer     Goal: Hospice house volunteer (s) enhances the quality of remaining life while patient is at the hospice house. Interventions: Ivon [place] Ivon Valdovinos Volunteer (s) will provide companionship to the patient and/or family by visiting at the hospice house       . [facility] Augustin Valdovinos Volunteer (s) will provide respite as needed when requested by patient and/or family. [facility] Holly Islas  Volunteer will provide activities such as music, reading, pet therapy, etc. as requested. [place] Holly Islas  Comfort bag delivered. Any other special requests or information regarding volunteer services:    Comfort bag delivered. No further needs identified at this time. These notes have been discussed in [facility] meeting. Signed by: Shaan Arcos IDG Nurse Notes by Tatiana Meredith RN at 06/19/17 1359  Version 1 of 1    Author:  Tatiana Meredith RN Service:  [facility] Author Type:  Registered Nurse    Filed:  06/19/17 1401 Date of Service:  06/19/17 1359 Status:  Signed    :  Tatiana Meredith RN (Registered Nurse)           Patient: Arjun Henley    Date: 06/19/17  Time: 1:59 PM    [facility] Nurse Notes    1st IDG:  Pt is 79 y/o female with dx of  Small bowel obstruction. Had active bowel sounds, NG tube clamped and clear liquids started. Had pain after that. Meds given. NG tube now on intermittent low suction. Pt is visually impaired. Abdomen is less distended and soft. No BM. Has desire to take clear liquids. Plan: Pt would like to go home if able, but will continue to monitor for symptom management and optimum comfort. Signed by: Vasiliy Dickinson RN       [facility] IDG  Notes by Clare Mccall at 06/19/17 1300  Version 1 of 1    Author:  Clare Mccall Service:  Galileo Howe Author Type:      Filed:  06/19/17 6306 Date of Service:  06/19/17 1300 Status:  Signed    :  Clare Mccall ()           Patient: Pretty Payment    Date: 06/19/17  Time: 1:00 PM    [facility]  Notes  LMSW completed initial assessment. Daughter has been caring for the pt for 17 years at home. Pt is blind.         Signed by: Clare Mccall

## 2017-07-03 NOTE — PROGRESS NOTES
The shift change rounds completed with the off going RN and report was taken. The patient was identified by name and date of birth. The patient is resting quietly in the bed with no signs of distress observed. Family is on the cot beside the bed. Pain =0, no signs of anxiety, SOB or other distress observed at this time. The bed is low and locked and the side rails are up times 2 for safety. Will continue to monitor.

## 2017-11-06 PROBLEM — H40.9 GLAUCOMA: Status: ACTIVE | Noted: 2017-01-01

## 2017-11-06 PROBLEM — K56.609 SMALL BOWEL OBSTRUCTION (HCC): Status: ACTIVE | Noted: 2017-01-01

## 2017-11-06 PROBLEM — R11.2 INTRACTABLE NAUSEA AND VOMITING: Status: ACTIVE | Noted: 2017-01-01

## 2017-11-06 NOTE — MED STUDENT NOTES
.     History and Physical    Patient: Dee Laughlin MRN: [id number]  SSN: [id number]    YOB: 1924  Age: 80 y.o. Sex: female      Subjective:      Dee Laughlin is a 80 y.o. female who is coming from in home hospice with complications from small bowel obstruction. The patient has had obstructions numerous times the last exacerbation being 6/14/2017 when the patient presented with abdominal pain, nausea, and vomiting. The CT scan display small bowel obstruction with fluid filled loops of small bowel. The patient stated she does not wish for surgical intervention. She understands the outcome of her decision and chose to further her care with comfort measures only. Past medical history includes glaucoma, blindness, hypertension, and chronic renal failure. The patient presents to the hospice house today with altered mental status. She is stated to have been incontinent of urine and bowel for 5 days. She weighs 85 lbs and is currently NPO. Under these circumstances the patients life expectancy is less than 10 days. Therefore she will be admitted GIP to the [facility] for small bowel obstruction to manage the symptoms of pain, agitation, dyspnea, and family and pt support. Past Medical History:   Diagnosis Date    Anemia     Blindness - both eyes 1997    CKD (chronic kidney disease) stage 3, GFR 30-59 ml/min     baseline creatinine 1.8    Hypertension     Other ill-defined conditions(799.89)     shingles     Past Surgical History:   Procedure Laterality Date    HX CHOLECYSTECTOMY      HX GI      bowel obstruction/resection    HX HEENT      Glaucoma    HX HYSTERECTOMY        Family History   Problem Relation Age of Onset    Hypertension Father      Social History   Substance Use Topics    Smoking status: Never Smoker    Smokeless tobacco: Never Used    Alcohol use No      Prior to Admission medications    Medication Sig Start Date End Date Taking?  Authorizing Provider   loperamide (IMODIUM) 2 mg capsule Take 2 mg by mouth as needed for Diarrhea (Loose stools). Take one tablet by mouth after each loose stool. Not to exceed 4 tablets within 24 hours. 9/21/17   Historical Provider   senna (SENNA) 8.6 mg tablet Take 1 Tab by mouth two (2) times a day. Take one tablet by mouth two times daily. HOLD if BMs are greater than two in 24 hours. 8/10/17   Historical Provider   acetaminophen (TYLENOL) 650 mg suppository Insert 650 mg into rectum every six (6) hours as needed for Fever or Pain. [facility] 7/19/17   Historical Provider   haloperidol (HALDOL) 2 mg/mL oral concentrate Take 1 mg by mouth every six (6) hours as needed (Agitation). Take 0.5ml (1mg) by mouth or under the tongue every 6 hours as needed for agitation. [facility] 7/19/17   Historical Provider   hyoscyamine SL (LEVSIN/SL) 0.125 mg SL tablet Take 0.125 mg by mouth every four (4) hours as needed (Secretions). Take one tablet under the tongue every four hours as needed for secretions. [facility] 7/19/17   Historical Provider   LORazepam (ATIVAN) 0.5 mg tablet Take 0.5 mg by mouth every six (6) hours as needed for Anxiety (agitation). [facility] 7/19/17   Historical Provider   prochlorperazine (COMPAZINE) 10 mg tablet Take 10 mg by mouth every six (6) hours as needed (nausea and/or vomiting). [facility] 7/19/17   Historical Provider   bisacodyl (DULCOLAX) 10 mg suppository Insert 10 mg into rectum daily as needed (Constipation). Insert one suppository rectally ONCE daily as needed for constipation. [facility] 7/19/17   Historical Provider   morphine (ROXANOL) 100 mg/5 mL (20 mg/mL) concentrated solution Take 5 mg by mouth every three (3) hours as needed for Pain (Shortness of Breath). Take 0.25ml (5mg) by mouth or under the tongue every three hours as needed for pain or shortness of breath. [facility] 7/19/17   Historical Provider   LORazepam (ATIVAN) 0.5 mg tablet Take 0.25 mg by mouth nightly.  7/3/17   Historical Provider   LORazepam (ATIVAN) 0.5 mg tablet Take 0.25 mg by mouth every four (4) hours as needed for Anxiety (or nausea). 7/3/17   Historical Provider   polyethylene glycol (MIRALAX) 17 gram packet Take 17 g by mouth daily. 7/3/17   Historical Provider   polyvinyl alcohol (LIQUIFILM TEARS) 1.4 % ophthalmic solution Administer 1 Drop to both eyes as needed (for dry eyes). 7/3/17   Historical Provider   oxyCODONE IR (ROXICODONE) 5 mg immediate release tablet Take 2.5 mg by mouth every four (4) hours as needed for Pain (pain, dyspnea or agonal distress). 7/3/17   Historical Provider   latanoprost (XALATAN) 0.005 % ophthalmic solution Administer 1 Drop to both eyes nightly. 7/3/17   Historical Provider   bisacodyl (DULCOLAX) 10 mg suppository Insert 10 mg into rectum daily as needed (for constipation). 7/3/17   Historical Provider        No Known Allergies    Review of Systems:  Review of systems not obtained due to patient factors. Objective:     Vitals:    11/06/17 1400   BP: (!) 130/91   Pulse: 98   Resp: 22   Temp: 96.7 °F (35.9 °C)        Physical Exam:  GENERAL: mild distress, non-verbal, appears stated age, pale, cachectic, temporal wasting  LUNG: clear to auscultation bilaterally  HEART: regular rate and rhythm, S1, S2 normal, no murmur, click, rub or gallop  ABDOMEN: TTP diffusely, distended, hard, bowel sounds in all 4 quadrants.  Bowel movement upon arrival    EXTREMITIES:  no edema, redness or tenderness in the calves or thighs, pedal pulses 2+ equal bilaterally, radial pulses 2+ equal bilaterally  SKIN: healing stage 1 pressure wound over sacrum  NEUROLOGIC: blind, no response to babinski reflex, 0/4 patellar reflex, no response to simple commands, eyes open, moans in response to pain     Assessment:     Hospital Problems  Date Reviewed: 11/6/2017          Codes Class Noted POA    Glaucoma ICD-10-CM: H40.9  ICD-9-CM: 365.9  11/6/2017 Unknown        Intractable nausea and vomiting ICD-10-CM: R11.2  ICD-9-CM: 536.2  11/6/2017 Unknown        Small bowel obstruction ICD-10-CM: O50.903  ICD-9-CM: 560.9  11/6/2017 Unknown        Small bowel obstruction due to adhesions ICD-10-CM: K56.50  ICD-9-CM: 560.81  6/17/2017 Yes        * (Principal)SBO (small bowel obstruction) ICD-10-CM: P24.444  ICD-9-CM: 560.9  6/14/2017 Yes        Malignant HTN with heart disease, 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, with chronic kidney disease ICD-10-CM: I13.10  ICD-9-CM: 404.00  8/8/2015 Yes        Chronic kidney disease, stage III (moderate) (Chronic) ICD-10-CM: N18.3  ICD-9-CM: 585.3  5/3/2013 Yes        Blind in both eyes (Chronic) ICD-10-CM: H54.3  ICD-9-CM: 369.00  5/2/2013 Yes              Plan:     Current Facility-Administered Medications   Medication Dose Route Frequency    LORazepam (ATIVAN) tablet 0.25 mg  0.25 mg SubLINGual Q4H PRN    LORazepam (ATIVAN) tablet 0.25 mg  0.25 mg SubLINGual QHS    latanoprost (XALATAN) 0.005 % ophthalmic solution 1 Drop (Patient Supplied)  1 Drop Both Eyes QHS    polyvinyl alcohol (LIQUIFILM TEARS) 1.4 % ophthalmic solution 1 Drop  1 Drop Both Eyes PRN    oxyCODONE IR (ROXICODONE) tablet 2.5 mg  2.5 mg Oral Q4H PRN    haloperidol lactate (HALDOL) injection 2 mg  2 mg SubCUTAneous Q1H PRN    Or    haloperidol lactate (HALDOL) injection 2 mg  2 mg IntraVENous Q1H PRN    acetaminophen (TYLENOL) suppository 650 mg  650 mg Rectal Q3H PRN    bisacodyl (DULCOLAX) suppository 10 mg  10 mg Rectal PRN    morphine injection 2 mg  2 mg SubCUTAneous Q20MIN PRN    Or    morphine injection 2 mg  2 mg IntraVENous Q20MIN PRN    glycopyrrolate (ROBINUL) injection 0.2 mg  0.2 mg SubCUTAneous Q4H PRN       1. Admitted GIP to the [facility] for small bowel obstruction to manage the symptoms of pain, agitation, dyspnea, and family and pt support. 2. Pain: Morphine 2 mg SC q 20 min PRN. 3. Dyspnea: morphine 2 mg SC q 20 min PRN. Glycopyrrolate 0.2 mg SC q 4 hrs PRN increased secretions. 4. Agitation: haloperidol 2 mg SC q 1 hr PRN.    5. Dry eyes: pt supplied latanoprost 0.005% solution 1 drop to each eye qhs. Pt supplied polyvinyl alcohol 1.4%% solution 1 drop both eyes PRN. 6. Family/pt support: no family was in the room upon examination today will f/u and inquire about family. PPS 10%. Signed By: Giovany Cheung     November 6, 2017        I have reviewed the medical student's complete note as history and physical describing Mrs Debbie Alvarez at the time of her admission to hospice house from the in-home program.   I have modified the report of subjective complaints in review of system, physical findings impression and plan to coincide with my own. In brief, she has experienced the expected recurrence of small bowel obstruction that caused her to be admitted to the [facility] several months ago. At that time she had a spontaneous remission of the obstruction over an interval of about a week. The patient's increased frailty and decreased nutritional and fluid reserves make it unlikely that she will experience another spontaneous remission. Supportive care for pain and nausea will be provided.   The patient's [facility] daughter is aware of the grim prognosis of less than 10 days expected survival.

## 2017-11-06 NOTE — PROGRESS NOTES
Assumed care of Pt at 1530. Pt made c/o pain to Dtr. RN called to room and Pt appears anxious as well. Attempted SL admin of ativan and oxy. Pt refusing to take meds. Clinched jaw and squeezing lips together. Was able to administer buccally. Dtr remains at bedside. Eating Pt tray. Pt does not appear to want meal.      1745: Report to be given to Mikaela Holcomb RN.

## 2017-11-07 NOTE — PROGRESS NOTES
Pt non-responsive, not breathing, no pulse. Daughter, Eliza Cummings at bedside, informed about pt passing, she became tearful, asked if she would like the hieu, she nodded. Yessi Chandra informed.

## 2017-11-07 NOTE — PROGRESS NOTES
Attempted to feed pt, pt would not open mouth, with use of swab attempted to administer thickened liquids, pt would not swallow.

## 2017-11-07 NOTE — PROGRESS NOTES
DEATH VISIT:    On-call  responded to request from family, relayed by [facility] RN Vicky Rivas, for prayer before mortuary assumed patient body.  was still present at [facility], and proceeded to patient room.  offered condolences and expressed sympathy to family members, and led family in time of prayer thanking the [address] for patient's life and legacy and asking for comfort for those who will mourn. Family was sad and tearful, but seemed to be coping well.  also told family about bereavement services.

## 2017-11-07 NOTE — PROGRESS NOTES
17 1025   Pyschosocial Assessment 1   Concerns from previous vist? No  (No Initial SW assessment)   Significant changes in relationships or household members? No   Signs of abuse or neglect? No   Financial Need (no needs at this time )   Pain signs needing to be reported to  No   Psychosocial Components/Teaching/Interventions Emotional support/supportive listening; Instructed on how to contact the agency with concerns; Tactile stimulation to the non-responsive patient   The patient has designated their health care surrogate Patient has not made wishes known  (no hCPOA on file )   LMSW spoke with the daughter Eliza Cummings in the room. Pt is known to this writer from a previous stay. Pt has declined since we have last visited. Pt currently is asleep. Eliza Cummings states she has been asleep since she arrived this am.       Pt is blind she lost her sight in . She still listens to the tv daily channel 7 is her favorite. Pt had 6 children 2 are . Eliza Cummings has been her caregiver for 17 years now with not much assistance from siblings according to Eliza Cummings. Shena Figueroa , Mary (dec) and Jackson Acharya (dec). Family will still be using [employer] [facility] and [place].       Pt is well supported by her [address] [place] [address]

## 2017-11-07 NOTE — PROGRESS NOTES
Pt daughter, Griselda Merchant, arrived discussed with her assessment findings and medication administered.

## 2017-11-07 NOTE — PROGRESS NOTES
requested by Ankita Richards RN. When  went into the room patient's daughter was at bedside. SANDY Harris was preparing patient's body. The TV was on.  spoke to patient's daughter briefly. At the beginning she was having difficulty bringing her thoughts together.  asked if she could make calls for her. CNA continued to work.  called the grandson on Jami's behalf. When the CNA said she was going to change the patient's brief,  asked if the daughter would like to step outside the room. Alexander Baird preferred to stay in the room.  left the room for patient's dignity and returned after the CNA left the room. Once  and daughter had some time to talk, Alexander Baird began to open up about her life with mom and how she had cared for her 16 years. Unfortunately her siblings did not assist.  Time with Alexander Baird was a mixture of tears and laughter as she shared with the . Jami's son and his girlfriend arrived.  offered prayer and left to give them some family time. Bereavement information shared.  assured family of continued support.

## 2017-11-07 NOTE — PROGRESS NOTES
Received report from off going RN and assumed care of patient. Patient identified by name and . Patient in bed watching tv. No s/sx of distress noted at this time. Daughter at bed side. Bed low and locked. Call light within reach. Door open for monitoring.

## 2017-11-10 NOTE — [facility] IDG BEREAVEMENT NOTES
PATIENT DEATH AND FAMILY BEREAVEMENT NEEDS DISCUSSED BY IDG. BEREAVEMENT RISK SUGGESTED BY IDG TEAM IS  MODERATE, BASED ON FACT THAT PATIENT'S DAUGHTER HAS TAKEN CARE OF PATIENT FOR 17 YEARS.   BEREAVEMENT SUPPORT TO BE PROVIDED ACCORDINGLY, AND ADJUSTMENT TO BEREAVEMENT RISK SCORE WILL BE MADE IF NEED BE.

## 2022-06-03 NOTE — PROGRESS NOTES
Admitted to [facility] from [facility] in home program with dx of small bowel obstruction and symptoms of pain. Pt has eyes open but is non verbal and does not follow commands. FLACC 0 at this time. Medium bowel movement on admission. Breath sounds clear but diminished on rights side. Skin is intact with reddened sacral area. Settled in bed. DNR signed by daughter. Bed low and SR up with tab alerts on. Continue to monitor for symptom management and optimum comfort. none

## 2024-09-12 NOTE — PROGRESS NOTES
Assisted with her supper by her daughter,,,able to take p.o.medications without difficulty,,abdomen remained distended,semi soft,hyperactive bowel sounds,,IV line paten with fluids infusing on right arm site,,IV access on left arm,intact,patent,,no complaints of pain,,no distress noted,,semi fowlers position. .. Carine Quiroz F/U outpatient